# Patient Record
Sex: FEMALE | Race: WHITE | NOT HISPANIC OR LATINO | Employment: OTHER | URBAN - METROPOLITAN AREA
[De-identification: names, ages, dates, MRNs, and addresses within clinical notes are randomized per-mention and may not be internally consistent; named-entity substitution may affect disease eponyms.]

---

## 2017-04-24 ENCOUNTER — TRANSCRIBE ORDERS (OUTPATIENT)
Dept: ADMINISTRATIVE | Facility: HOSPITAL | Age: 57
End: 2017-04-24

## 2017-04-24 DIAGNOSIS — M54.5 LOW BACK PAIN, UNSPECIFIED BACK PAIN LATERALITY, UNSPECIFIED CHRONICITY, WITH SCIATICA PRESENCE UNSPECIFIED: Primary | ICD-10-CM

## 2017-04-24 DIAGNOSIS — M51.36 DEGENERATIVE DISC DISEASE, LUMBAR: ICD-10-CM

## 2017-05-01 ENCOUNTER — HOSPITAL ENCOUNTER (OUTPATIENT)
Dept: MRI IMAGING | Facility: HOSPITAL | Age: 57
Discharge: HOME | End: 2017-05-01
Attending: GENERAL PRACTICE | Admitting: GENERAL PRACTICE

## 2017-05-01 DIAGNOSIS — M51.36 DEGENERATIVE DISC DISEASE, LUMBAR: ICD-10-CM

## 2017-05-01 DIAGNOSIS — M54.5 LOW BACK PAIN, UNSPECIFIED BACK PAIN LATERALITY, UNSPECIFIED CHRONICITY, WITH SCIATICA PRESENCE UNSPECIFIED: ICD-10-CM

## 2017-05-01 PROCEDURE — 72148 MRI LUMBAR SPINE W/O DYE: CPT

## 2018-08-17 ENCOUNTER — TRANSCRIBE ORDERS (OUTPATIENT)
Dept: ADMINISTRATIVE | Facility: HOSPITAL | Age: 58
End: 2018-08-17

## 2018-08-17 DIAGNOSIS — M25.561 RIGHT KNEE PAIN, UNSPECIFIED CHRONICITY: Primary | ICD-10-CM

## 2018-08-23 ENCOUNTER — APPOINTMENT (OUTPATIENT)
Dept: MRI IMAGING | Facility: HOSPITAL | Age: 58
End: 2018-08-23
Attending: GENERAL PRACTICE

## 2018-08-27 ENCOUNTER — HOSPITAL ENCOUNTER (OUTPATIENT)
Dept: MRI IMAGING | Facility: HOSPITAL | Age: 58
End: 2018-08-27
Attending: GENERAL PRACTICE

## 2018-08-29 ENCOUNTER — HOSPITAL ENCOUNTER (OUTPATIENT)
Dept: MRI IMAGING | Facility: HOSPITAL | Age: 58
End: 2018-08-29
Attending: GENERAL PRACTICE

## 2018-09-07 ENCOUNTER — HOSPITAL ENCOUNTER (OUTPATIENT)
Dept: MRI IMAGING | Facility: HOSPITAL | Age: 58
Discharge: HOME | End: 2018-09-07
Attending: GENERAL PRACTICE | Admitting: GENERAL PRACTICE

## 2018-09-07 DIAGNOSIS — M25.561 RIGHT KNEE PAIN, UNSPECIFIED CHRONICITY: ICD-10-CM

## 2018-09-07 PROCEDURE — 73721 MRI JNT OF LWR EXTRE W/O DYE: CPT

## 2019-08-05 ENCOUNTER — TRANSCRIBE ORDERS (OUTPATIENT)
Dept: ADMINISTRATIVE | Facility: HOSPITAL | Age: 59
End: 2019-08-05

## 2019-08-05 DIAGNOSIS — Z12.2 ENCOUNTER FOR SCREENING FOR LUNG CANCER: Primary | ICD-10-CM

## 2019-08-13 ENCOUNTER — HOSPITAL ENCOUNTER (OUTPATIENT)
Dept: CT IMAGING | Facility: HOSPITAL | Age: 59
Discharge: HOME | End: 2019-08-13
Admitting: GENERAL PRACTICE

## 2019-08-13 DIAGNOSIS — Z12.2 ENCOUNTER FOR SCREENING FOR LUNG CANCER: ICD-10-CM

## 2019-08-13 PROCEDURE — G0297 LDCT FOR LUNG CA SCREEN: HCPCS

## 2020-09-05 ENCOUNTER — APPOINTMENT (OUTPATIENT)
Dept: GENERAL RADIOLOGY | Facility: HOSPITAL | Age: 60
End: 2020-09-05

## 2020-09-05 ENCOUNTER — HOSPITAL ENCOUNTER (EMERGENCY)
Facility: HOSPITAL | Age: 60
Discharge: HOME | End: 2020-09-05
Attending: EMERGENCY MEDICINE | Admitting: EMERGENCY MEDICINE

## 2020-09-05 VITALS
OXYGEN SATURATION: 97 % | TEMPERATURE: 98.3 F | RESPIRATION RATE: 18 BRPM | HEIGHT: 66 IN | DIASTOLIC BLOOD PRESSURE: 61 MMHG | HEART RATE: 86 BPM | SYSTOLIC BLOOD PRESSURE: 105 MMHG | BODY MASS INDEX: 17.76 KG/M2

## 2020-09-05 DIAGNOSIS — M65.20 CALCIFIC TENDONITIS: Primary | ICD-10-CM

## 2020-09-05 PROCEDURE — 73502 X-RAY EXAM HIP UNI 2-3 VIEWS: CPT

## 2020-09-05 PROCEDURE — 99283 EMERGENCY DEPT VISIT LOW MDM: CPT

## 2020-09-05 NOTE — ED NOTES
Pt reports hx of RA. C/o L hip pain when she ambulates a certain way. Began Monday; denies known injury. Pt wearing mask. This RN wearing mask and safety glasses.       Jaqui Aleman, RN  09/05/20 7637

## 2020-09-05 NOTE — ED PROVIDER NOTES
" EMERGENCY DEPARTMENT ENCOUNTER    Room Number:  19/19  Date of encounter:  9/5/2020  PCP: Radu Rawls MD  Historian: Patient      HPI:  Chief Complaint: Left hip pain  A complete HPI/ROS/PMH/PSH/SH/FH are unobtainable due to: N/A    Context: Meme Mcginnis is a 60 y.o. female who presents to the ED c/o left hip pain for the last 5 days or so.  She states she felt like her leg \"gave out\" and she stumbled but did not fall.  Since that time, she has had pain over the left lateral aspect of the hip.  If she moves it a certain way, it is sharp and severe.  The pain radiates into her mid thigh and around her buttocks.  There is no numbness or tingling in the leg.  She has noticed no swelling.  She does have rheumatoid arthritis and chronic neck and back pain.  No fevers, chills, nausea, vomiting, diarrhea, cough, congestion, chest pain or trouble breathing.      The patient was placed in a mask in triage, hand hygiene was performed before and after my interaction with the patient.  I wore a mask, safety glasses and gloves during my entire interaction with the patient.    PAST MEDICAL HISTORY  Active Ambulatory Problems     Diagnosis Date Noted   • No Active Ambulatory Problems     Resolved Ambulatory Problems     Diagnosis Date Noted   • No Resolved Ambulatory Problems     Past Medical History:   Diagnosis Date   • Allergic rhinitis    • Anxiety    • Arthralgia    • Chronic neck and back pain    • Chronic radicular low back pain    • Cough    • DDD (degenerative disc disease), cervical    • DDD (degenerative disc disease), lumbar    • Depression    • Fatigue    • Hyperlipidemia    • Insomnia    • Long term use of drug    • Neck pain    • Palpitations          PAST SURGICAL HISTORY  History reviewed. No pertinent surgical history.      FAMILY HISTORY  History reviewed. No pertinent family history.      SOCIAL HISTORY  Social History     Socioeconomic History   • Marital status:      Spouse name: Not on " file   • Number of children: Not on file   • Years of education: Not on file   • Highest education level: Not on file   Tobacco Use   • Smoking status: Current Every Day Smoker         ALLERGIES  Morphine and related        REVIEW OF SYSTEMS  Review of Systems   Constitutional: Negative for fever.   HENT: Negative for sore throat.    Eyes: Negative.    Respiratory: Negative for cough and shortness of breath.    Cardiovascular: Negative for chest pain.   Gastrointestinal: Negative for abdominal pain, diarrhea and vomiting.   Genitourinary: Negative for dysuria.   Musculoskeletal: Positive for back pain (Chronic). Negative for neck pain.        Left hip pain   Skin: Negative for rash.   Allergic/Immunologic: Negative.    Neurological: Negative for weakness, numbness and headaches.   Hematological: Negative.    Psychiatric/Behavioral: Negative.    All other systems reviewed and are negative.       All systems reviewed and negative except for those discussed in HPI.       PHYSICAL EXAM    I have reviewed the triage vital signs and nursing notes.    ED Triage Vitals [09/05/20 1204]   Temp Heart Rate Resp BP SpO2   98.3 °F (36.8 °C) 114 16 -- 97 %      Temp src Heart Rate Source Patient Position BP Location FiO2 (%)   Tympanic Monitor -- -- --       Physical Exam   Constitutional: Pt. is oriented to person, place, and time and well-developed, well-nourished, and in no distress. No distress.   HENT: Normocephalic and atraumatic.  Neck: Normal range of motion. Neck supple.   Cardiovascular: Normal rate, regular rhythm and normal heart sounds. Exam reveals no gallop and no friction rub.   No murmur heard.  Pulmonary/Chest: Effort normal and breath sounds normal. No stridor. No respiratory distress. No wheezes, no rales.   Musculoskeletal: Left lower extremity: There is tenderness over the lateral hip in the area of the greater trochanter.  She has mild pain with external rotation.  Internal rotation is fine.  Forward flexion  is normal as well.  No deformity.  She does have some mild left SI pain.  The remainder of the left lower extremity is unremarkable.  Remaining extremities exhibit normal range of motion and are without edema, tenderness or deformity.   Neurological: Pt. is alert and oriented to person, place, and time. Pt. has normal sensation and normal strength. No cranial nerve deficit. GCS score is 15.   Skin: Skin is warm and dry. No rash noted. Pt. is not diaphoretic. No erythema.   Psychiatric: Mood, affect and judgment normal.   Nursing note and vitals reviewed.        LAB RESULTS  No results found for this or any previous visit (from the past 24 hour(s)).    Ordered the above labs and independently reviewed the results.        RADIOLOGY  Xr Hip With Or Without Pelvis 2 - 3 View Left    Result Date: 9/5/2020  TWO-VIEW LEFT HIP AND ONE VIEW AP PELVIS  HISTORY: Hip pain. No trauma.  FINDINGS: There are minimal degenerative changes involving the left hip more than right with some marginal spurring at the superior margin of the acetabulum. There is also some soft tissue calcification near the greater trochanter likely related to an element of calcific tendinitis.  This report was finalized on 9/5/2020 12:56 PM by Dr. Emiliano Acosta M.D.        I ordered the above noted radiological studies. Reviewed by me and discussed with radiologist.  See dictation for official radiology interpretation.      PROCEDURES    Procedures      MEDICATIONS GIVEN IN ER    Medications - No data to display      PROGRESS, DATA ANALYSIS, CONSULTS, AND MEDICAL DECISION MAKING    Any/all labs have been independently reviewed by me.  Any/all radiology studies have been reviewed by me and discussed with radiologist dictating the report.   EKG's independently viewed and interpreted by me.  Discussion below represents my analysis of pertinent findings related to patient's condition, differential diagnosis, treatment plan and final disposition.      ED Course  as of Sep 05 1321   Sat Sep 05, 2020   1307 Hip x-ray independently viewed by me and discussed with radiology.  Impression is as follows: : There are minimal degenerative changes involving the left hip  more than right with some marginal spurring at the superior margin of  the acetabulum. There is also some soft tissue calcification near the  greater trochanter likely related to an element of calcific tendinitis.: There are minimal degenerative changes involving the left hip  more than right with some marginal spurring at the superior margin of  the acetabulum. There is also some soft tissue calcification near the  greater trochanter likely related to an element of calcific tendinitis.    [WC]      ED Course User Index  [WC] Huy Church MD       AS OF 13:21 VITALS:    BP - 116/85  HR - 114  TEMP - 98.3 °F (36.8 °C) (Tympanic)  02 SATS - 97%        DIAGNOSIS  Final diagnoses:   Calcific tendonitis         DISPOSITION  Discharged           Huy Church MD  09/05/20 1321

## 2021-03-24 ENCOUNTER — BULK ORDERING (OUTPATIENT)
Dept: CASE MANAGEMENT | Facility: OTHER | Age: 61
End: 2021-03-24

## 2021-03-24 DIAGNOSIS — Z23 IMMUNIZATION DUE: ICD-10-CM

## 2021-04-25 ENCOUNTER — APPOINTMENT (OUTPATIENT)
Dept: CT IMAGING | Facility: HOSPITAL | Age: 61
End: 2021-04-25

## 2021-04-25 ENCOUNTER — HOSPITAL ENCOUNTER (EMERGENCY)
Facility: HOSPITAL | Age: 61
Discharge: HOME | End: 2021-04-25
Attending: EMERGENCY MEDICINE | Admitting: EMERGENCY MEDICINE

## 2021-04-25 VITALS
BODY MASS INDEX: 20.99 KG/M2 | TEMPERATURE: 97.8 F | HEIGHT: 65 IN | OXYGEN SATURATION: 99 % | DIASTOLIC BLOOD PRESSURE: 57 MMHG | HEART RATE: 71 BPM | SYSTOLIC BLOOD PRESSURE: 125 MMHG | WEIGHT: 126 LBS | RESPIRATION RATE: 18 BRPM

## 2021-04-25 DIAGNOSIS — R10.13 EPIGASTRIC ABDOMINAL PAIN: ICD-10-CM

## 2021-04-25 DIAGNOSIS — K52.9 COLITIS: Primary | ICD-10-CM

## 2021-04-25 LAB
ALBUMIN SERPL-MCNC: 4.7 G/DL (ref 3.5–5.2)
ALBUMIN/GLOB SERPL: 1.9 G/DL
ALP SERPL-CCNC: 55 U/L (ref 39–117)
ALT SERPL W P-5'-P-CCNC: 22 U/L (ref 1–33)
ANION GAP SERPL CALCULATED.3IONS-SCNC: 11.4 MMOL/L (ref 5–15)
AST SERPL-CCNC: 18 U/L (ref 1–32)
BACTERIA UR QL AUTO: ABNORMAL /HPF
BASOPHILS # BLD AUTO: 0.05 10*3/MM3 (ref 0–0.2)
BASOPHILS NFR BLD AUTO: 1 % (ref 0–1.5)
BILIRUB SERPL-MCNC: 0.3 MG/DL (ref 0–1.2)
BILIRUB UR QL STRIP: NEGATIVE
BUN SERPL-MCNC: 11 MG/DL (ref 6–20)
BUN/CREAT SERPL: 12.8 (ref 7–25)
CALCIUM SPEC-SCNC: 9.7 MG/DL (ref 8.6–10.5)
CHLORIDE SERPL-SCNC: 96 MMOL/L (ref 98–107)
CLARITY UR: CLEAR
CO2 SERPL-SCNC: 27.6 MMOL/L (ref 22–29)
COLOR UR: ABNORMAL
CREAT SERPL-MCNC: 0.86 MG/DL (ref 0.57–1)
DEPRECATED RDW RBC AUTO: 42.9 FL (ref 37–54)
EOSINOPHIL # BLD AUTO: 0.09 10*3/MM3 (ref 0–0.4)
EOSINOPHIL NFR BLD AUTO: 1.7 % (ref 0.3–6.2)
ERYTHROCYTE [DISTWIDTH] IN BLOOD BY AUTOMATED COUNT: 12.5 % (ref 12.3–15.4)
GFR SERPL CREATININE-BSD FRML MDRD: 68 ML/MIN/1.73
GLOBULIN UR ELPH-MCNC: 2.5 GM/DL
GLUCOSE SERPL-MCNC: 103 MG/DL (ref 65–99)
GLUCOSE UR STRIP-MCNC: NEGATIVE MG/DL
HCT VFR BLD AUTO: 40.5 % (ref 34–46.6)
HGB BLD-MCNC: 13.5 G/DL (ref 12–15.9)
HGB UR QL STRIP.AUTO: ABNORMAL
HYALINE CASTS UR QL AUTO: ABNORMAL /LPF
IMM GRANULOCYTES # BLD AUTO: 0.01 10*3/MM3 (ref 0–0.05)
IMM GRANULOCYTES NFR BLD AUTO: 0.2 % (ref 0–0.5)
KETONES UR QL STRIP: ABNORMAL
LEUKOCYTE ESTERASE UR QL STRIP.AUTO: NEGATIVE
LIPASE SERPL-CCNC: 18 U/L (ref 13–60)
LYMPHOCYTES # BLD AUTO: 2.21 10*3/MM3 (ref 0.7–3.1)
LYMPHOCYTES NFR BLD AUTO: 42.3 % (ref 19.6–45.3)
MCH RBC QN AUTO: 31 PG (ref 26.6–33)
MCHC RBC AUTO-ENTMCNC: 33.3 G/DL (ref 31.5–35.7)
MCV RBC AUTO: 93.1 FL (ref 79–97)
MONOCYTES # BLD AUTO: 0.42 10*3/MM3 (ref 0.1–0.9)
MONOCYTES NFR BLD AUTO: 8 % (ref 5–12)
NEUTROPHILS NFR BLD AUTO: 2.44 10*3/MM3 (ref 1.7–7)
NEUTROPHILS NFR BLD AUTO: 46.8 % (ref 42.7–76)
NITRITE UR QL STRIP: NEGATIVE
NRBC BLD AUTO-RTO: 0 /100 WBC (ref 0–0.2)
PH UR STRIP.AUTO: 5.5 [PH] (ref 5–8)
PLATELET # BLD AUTO: 311 10*3/MM3 (ref 140–450)
PMV BLD AUTO: 9.4 FL (ref 6–12)
POTASSIUM SERPL-SCNC: 3.4 MMOL/L (ref 3.5–5.2)
PROT SERPL-MCNC: 7.2 G/DL (ref 6–8.5)
PROT UR QL STRIP: ABNORMAL
RBC # BLD AUTO: 4.35 10*6/MM3 (ref 3.77–5.28)
RBC # UR: ABNORMAL /HPF
REF LAB TEST METHOD: ABNORMAL
SODIUM SERPL-SCNC: 135 MMOL/L (ref 136–145)
SP GR UR STRIP: 1.02 (ref 1–1.03)
SQUAMOUS #/AREA URNS HPF: ABNORMAL /HPF
TROPONIN T SERPL-MCNC: <0.01 NG/ML (ref 0–0.03)
UROBILINOGEN UR QL STRIP: ABNORMAL
WBC # BLD AUTO: 5.22 10*3/MM3 (ref 3.4–10.8)
WBC UR QL AUTO: ABNORMAL /HPF

## 2021-04-25 PROCEDURE — 81001 URINALYSIS AUTO W/SCOPE: CPT | Performed by: EMERGENCY MEDICINE

## 2021-04-25 PROCEDURE — 80053 COMPREHEN METABOLIC PANEL: CPT | Performed by: EMERGENCY MEDICINE

## 2021-04-25 PROCEDURE — 83690 ASSAY OF LIPASE: CPT | Performed by: EMERGENCY MEDICINE

## 2021-04-25 PROCEDURE — 93010 ELECTROCARDIOGRAM REPORT: CPT | Performed by: INTERNAL MEDICINE

## 2021-04-25 PROCEDURE — 74177 CT ABD & PELVIS W/CONTRAST: CPT

## 2021-04-25 PROCEDURE — 85025 COMPLETE CBC W/AUTO DIFF WBC: CPT | Performed by: EMERGENCY MEDICINE

## 2021-04-25 PROCEDURE — 84484 ASSAY OF TROPONIN QUANT: CPT | Performed by: EMERGENCY MEDICINE

## 2021-04-25 PROCEDURE — 99284 EMERGENCY DEPT VISIT MOD MDM: CPT

## 2021-04-25 PROCEDURE — 25010000002 IOPAMIDOL 61 % SOLUTION: Performed by: EMERGENCY MEDICINE

## 2021-04-25 PROCEDURE — 93005 ELECTROCARDIOGRAM TRACING: CPT | Performed by: EMERGENCY MEDICINE

## 2021-04-25 RX ORDER — ONDANSETRON 4 MG/1
4 TABLET, ORALLY DISINTEGRATING ORAL EVERY 8 HOURS PRN
Qty: 15 TABLET | Refills: 0 | Status: SHIPPED | OUTPATIENT
Start: 2021-04-25 | End: 2021-04-30

## 2021-04-25 RX ORDER — METRONIDAZOLE 500 MG/1
500 TABLET ORAL 4 TIMES DAILY
Qty: 28 TABLET | Refills: 0 | Status: SHIPPED | OUTPATIENT
Start: 2021-04-25 | End: 2021-05-02

## 2021-04-25 RX ORDER — SODIUM CHLORIDE 0.9 % (FLUSH) 0.9 %
10 SYRINGE (ML) INJECTION AS NEEDED
Status: DISCONTINUED | OUTPATIENT
Start: 2021-04-25 | End: 2021-04-25 | Stop reason: HOSPADM

## 2021-04-25 RX ORDER — METRONIDAZOLE 500 MG/1
500 TABLET ORAL ONCE
Status: COMPLETED | OUTPATIENT
Start: 2021-04-25 | End: 2021-04-25

## 2021-04-25 RX ADMIN — METRONIDAZOLE 500 MG: 500 TABLET, FILM COATED ORAL at 20:31

## 2021-04-25 RX ADMIN — SODIUM CHLORIDE, POTASSIUM CHLORIDE, SODIUM LACTATE AND CALCIUM CHLORIDE 1000 ML: 600; 310; 30; 20 INJECTION, SOLUTION INTRAVENOUS at 17:13

## 2021-04-25 RX ADMIN — IOPAMIDOL 85 ML: 612 INJECTION, SOLUTION INTRAVENOUS at 18:43

## 2021-04-25 NOTE — ED NOTES
"Pt to ED from home with c/o abd pain x a few months but had worsening pain this weekend.  Pt reports that pain feels like a \"knot\" and has more pressure when bending over.  Pt reports nausea and diarrhea no vomiting.      Pt wearing mask, staff wearing appropriate PPE.     Key Rawls, RN  04/25/21 2572    "

## 2021-04-25 NOTE — ED PROVIDER NOTES
EMERGENCY DEPARTMENT ENCOUNTER    Room Number:  36/36  Date of encounter:  4/25/2021  PCP: Radu Rawls MD  Historian: patient      HPI:  Chief Complaint: abd pain   A complete HPI/ROS/PMH/PSH/SH/FH are unobtainable due to: none    Context: Meme Mcginnis is a 60 y.o. female who presents to the ED c/o abd pain. Onset 2 to 3 months ago.  This is intermittent.  It worsened Friday when she woke up with nausea and bloating.  She reports of epigastric pressure that is 3/10 in intensity.  It worsens with p.o. intake and bending forward.  She reports having mild associated loose stool at times.  No fever, chest pain, shortness of breath.  No history of prior abdominal surgery.      PAST MEDICAL HISTORY  Active Ambulatory Problems     Diagnosis Date Noted   • No Active Ambulatory Problems     Resolved Ambulatory Problems     Diagnosis Date Noted   • No Resolved Ambulatory Problems     Past Medical History:   Diagnosis Date   • Allergic rhinitis    • Anxiety    • Arthralgia    • Chronic neck and back pain    • Chronic radicular low back pain    • Cough    • DDD (degenerative disc disease), cervical    • DDD (degenerative disc disease), lumbar    • Depression    • Fatigue    • Hyperlipidemia    • Insomnia    • Long term use of drug    • Neck pain    • Palpitations          PAST SURGICAL HISTORY  History reviewed. No pertinent surgical history.      FAMILY HISTORY  History reviewed. No pertinent family history.      SOCIAL HISTORY  Social History     Socioeconomic History   • Marital status:      Spouse name: Not on file   • Number of children: Not on file   • Years of education: Not on file   • Highest education level: Not on file   Tobacco Use   • Smoking status: Current Every Day Smoker     Packs/day: 1.00     Types: Cigarettes   • Smokeless tobacco: Never Used   Substance and Sexual Activity   • Alcohol use: Not Currently   • Drug use: Not Currently         ALLERGIES  Morphine and related        REVIEW  OF SYSTEMS  Review of Systems     All systems reviewed and negative except for those discussed in HPI.       PHYSICAL EXAM    I have reviewed the triage vital signs and nursing notes.    ED Triage Vitals   Temp Heart Rate Resp BP SpO2   04/25/21 1610 04/25/21 1610 04/25/21 1610 04/25/21 1622 04/25/21 1610   97.8 °F (36.6 °C) 88 16 141/81 94 %      Temp src Heart Rate Source Patient Position BP Location FiO2 (%)   -- -- 04/25/21 1622 04/25/21 1622 --     Lying Right arm        Physical Exam  GENERAL: not distressed  HENT: nares patent  EYES: no scleral icterus  CV: regular rhythm, regular rate  RESPIRATORY: normal effort, clear to auscultation bilaterally  ABDOMEN: soft, nontender  MUSCULOSKELETAL: no deformity  NEURO: alert, moves all extremities, follows commands  SKIN: warm, dry        LAB RESULTS  Recent Results (from the past 24 hour(s))   Urinalysis With Microscopic If Indicated (No Culture) - Urine, Clean Catch    Collection Time: 04/25/21  4:40 PM    Specimen: Urine, Clean Catch   Result Value Ref Range    Color, UA Dark Yellow (A) Yellow, Straw    Appearance, UA Clear Clear    pH, UA 5.5 5.0 - 8.0    Specific Gravity, UA 1.023 1.005 - 1.030    Glucose, UA Negative Negative    Ketones, UA Trace (A) Negative    Bilirubin, UA Negative Negative    Blood, UA Large (3+) (A) Negative    Protein, UA Trace (A) Negative    Leuk Esterase, UA Negative Negative    Nitrite, UA Negative Negative    Urobilinogen, UA 1.0 E.U./dL 0.2 - 1.0 E.U./dL   Urinalysis, Microscopic Only - Urine, Clean Catch    Collection Time: 04/25/21  4:40 PM    Specimen: Urine, Clean Catch   Result Value Ref Range    RBC, UA 31-50 (A) None Seen, 0-2 /HPF    WBC, UA 0-2 None Seen, 0-2 /HPF    Bacteria, UA None Seen None Seen /HPF    Squamous Epithelial Cells, UA 0-2 None Seen, 0-2 /HPF    Hyaline Casts, UA 13-20 None Seen /LPF    Methodology Automated Microscopy    Comprehensive Metabolic Panel    Collection Time: 04/25/21  4:41 PM    Specimen:  Blood   Result Value Ref Range    Glucose 103 (H) 65 - 99 mg/dL    BUN 11 6 - 20 mg/dL    Creatinine 0.86 0.57 - 1.00 mg/dL    Sodium 135 (L) 136 - 145 mmol/L    Potassium 3.4 (L) 3.5 - 5.2 mmol/L    Chloride 96 (L) 98 - 107 mmol/L    CO2 27.6 22.0 - 29.0 mmol/L    Calcium 9.7 8.6 - 10.5 mg/dL    Total Protein 7.2 6.0 - 8.5 g/dL    Albumin 4.70 3.50 - 5.20 g/dL    ALT (SGPT) 22 1 - 33 U/L    AST (SGOT) 18 1 - 32 U/L    Alkaline Phosphatase 55 39 - 117 U/L    Total Bilirubin 0.3 0.0 - 1.2 mg/dL    eGFR Non African Amer 68 >60 mL/min/1.73    Globulin 2.5 gm/dL    A/G Ratio 1.9 g/dL    BUN/Creatinine Ratio 12.8 7.0 - 25.0    Anion Gap 11.4 5.0 - 15.0 mmol/L   Lipase    Collection Time: 04/25/21  4:41 PM    Specimen: Blood   Result Value Ref Range    Lipase 18 13 - 60 U/L   CBC Auto Differential    Collection Time: 04/25/21  4:41 PM    Specimen: Blood   Result Value Ref Range    WBC 5.22 3.40 - 10.80 10*3/mm3    RBC 4.35 3.77 - 5.28 10*6/mm3    Hemoglobin 13.5 12.0 - 15.9 g/dL    Hematocrit 40.5 34.0 - 46.6 %    MCV 93.1 79.0 - 97.0 fL    MCH 31.0 26.6 - 33.0 pg    MCHC 33.3 31.5 - 35.7 g/dL    RDW 12.5 12.3 - 15.4 %    RDW-SD 42.9 37.0 - 54.0 fl    MPV 9.4 6.0 - 12.0 fL    Platelets 311 140 - 450 10*3/mm3    Neutrophil % 46.8 42.7 - 76.0 %    Lymphocyte % 42.3 19.6 - 45.3 %    Monocyte % 8.0 5.0 - 12.0 %    Eosinophil % 1.7 0.3 - 6.2 %    Basophil % 1.0 0.0 - 1.5 %    Immature Grans % 0.2 0.0 - 0.5 %    Neutrophils, Absolute 2.44 1.70 - 7.00 10*3/mm3    Lymphocytes, Absolute 2.21 0.70 - 3.10 10*3/mm3    Monocytes, Absolute 0.42 0.10 - 0.90 10*3/mm3    Eosinophils, Absolute 0.09 0.00 - 0.40 10*3/mm3    Basophils, Absolute 0.05 0.00 - 0.20 10*3/mm3    Immature Grans, Absolute 0.01 0.00 - 0.05 10*3/mm3    nRBC 0.0 0.0 - 0.2 /100 WBC   Troponin    Collection Time: 04/25/21  4:41 PM    Specimen: Blood   Result Value Ref Range    Troponin T <0.010 0.000 - 0.030 ng/mL   ECG 12 Lead    Collection Time: 04/25/21  5:15 PM    Result Value Ref Range    QT Interval 455 ms       Ordered the above labs and independently reviewed the results.        RADIOLOGY  CT Abdomen Pelvis With Contrast    Result Date: 4/25/2021  Patient: DOMINGUEZ APODACA  Time Out: 20:10 Exam(s): CT ABDOMEN + PELVIS With Contrast EXAM:   CT Abdomen and Pelvis With Intravenous Contrast CLINICAL HISTORY:    epigastric pain and RUQ pain  Reason for exam: epigastric pain and RUQ pain. TECHNIQUE:   Axial computed tomography images of the abdomen and pelvis with intravenous contrast.  CTDI is 9.7 mGy and DLP is 505.0 mGy-cm.  This CT exam was performed according to the principle of ALARA (As Low As Reasonably Achievable) by using one or more of the following dose reduction techniques: automated exposure control, adjustment of the mA and or kV according to patient size, and or use of iterative reconstruction technique. COMPARISON:   CT abdomen pelvis on 11 11 2002 FINDINGS:   Limitations:  Evaluation of the upper abdomen is limited by motion artifact.   Lung bases:  Unremarkable.  No mass.  No consolidation.  ABDOMEN:   Liver:  Unremarkable.  No mass.   Gallbladder and bile ducts:  Unremarkable.  No calcified stones.  No ductal dilation.   Pancreas:  Unremarkable.  No mass.  No ductal dilation.   Spleen:  Unremarkable.  No splenomegaly.   Adrenals:  Unremarkable.  No mass.   Kidneys and ureters:  No hydronephrosis or obstructing stone.   Stomach and bowel:  Mild prominence of the walls of the rectum and sigmoid colon may be secondary to underdistention.  Component of colitis or proctitis is not excluded.  Fluid and gas-filled small bowel loops could represent enteritis or ileus.  Evaluation of the stomach is limited by underdistention.  PELVIS:   Appendix:  No findings to suggest acute appendicitis.   Bladder:  Unremarkable.  No mass.   Reproductive:  Prior hysterectomy.  ABDOMEN and PELVIS:   Intraperitoneal space:  Unremarkable.  No free air.  No significant fluid collection.    Bones joints:  No acute fracture.  No dislocation.   Soft tissues:  Unremarkable.   Vasculature:  Atherosclerotic changes of the vasculature.  No aortic aneurysm or dissection.   Lymph nodes:  Unremarkable.  No enlarged lymph nodes. IMPRESSION:     1.  Mild prominence of the walls of the rectum and sigmoid colon may be secondary to underdistention.  Component of colitis or proctitis is not excluded. 2.  Fluid and gas-filled small bowel loops could represent enteritis or ileus.     Electronically signed by Sierra Arenas M.D. on 04-25-21 at 2010      I ordered the above noted radiological studies. Reviewed by me and discussed with radiologist.  See dictation for official radiology interpretation.      PROCEDURES    Procedures      MEDICATIONS GIVEN IN ER    Medications   sodium chloride 0.9 % flush 10 mL (has no administration in time range)   metroNIDAZOLE (FLAGYL) tablet 500 mg (has no administration in time range)   lactated ringers bolus 1,000 mL (0 mL Intravenous Stopped 4/25/21 1743)   iopamidol (ISOVUE-300) 61 % injection 100 mL (85 mL Intravenous Given by Other 4/25/21 1843)         PROGRESS, DATA ANALYSIS, CONSULTS, AND MEDICAL DECISION MAKING    All labs have been independently reviewed by me.  All radiology studies have been reviewed by me and discussed with radiologist dictating the report.   EKG's independently viewed and interpreted by me.  Discussion below represents my analysis of pertinent findings related to patient's condition, differential diagnosis, treatment plan and final disposition.    Differential diagnosis includes but not limited to:  - hepatobiliary pathology such as cholecystitis, cholangitis, and symptomatic cholelithiasis  - Pancreatitis  - Dyspepsia  - Small bowel obstruction  - Appendicitis  - Diverticulitis  - UTI including pyelonephritis  - Ureteral stone  - Zoster  - Colitis, including infectious and ischemic  - Atypical ACS    ED Course as of Apr 25 2021   Sun Apr 25, 2021    1633 On medical chart review, patient was last seen emergency department on 9/5/2020 for left hip pain.  She was diagnosed with calcific tendinitis.    [TD]   1709 RBC, UA(!): 31-50 [TD]   1709 WBC, UA: 0-2 [TD]   1709 Lipase: 18 [TD]   1733 EKG interpreted by myself.  Time 1715.  Sinus rhythm.  Heart rate 63.  Normal intervals and axis.  No acute ST abnormality.    [TD]   1753 Sodium(!): 135 [TD]   1753 Creatinine: 0.86 [TD]   1753 Potassium(!): 3.4 [TD]   2019 I updated the patient on CT results.  I Christie prescribe her Flagyl for the potential colitis.  I did discuss the diagnostic uncertainty.  I gave her return precautions.  Discharge home.    [TD]      ED Course User Index  [TD] Radu Beverly II, MD           PPE: Both the patient and I wore a surgical mask throughout the entire patient encounter. I wore protective goggles.     AS OF 20:21 EDT VITALS:    BP - 141/81  HR - 65  TEMP - 97.8 °F (36.6 °C)  O2 SATS - 99%        DIAGNOSIS  Final diagnoses:   Colitis   Epigastric abdominal pain         DISPOSITION  DISCHARGE    FOLLOW-UP  Radu Rawls MD  [address] [id number] [phone]    Schedule an appointment as soon as possible for a visit in 1 week  if symptoms do not improve    [facility] Emergency Department  [address] [facility] [address] [phone]  Go to   If symptoms worsen         Medication List      New Prescriptions    metroNIDAZOLE 500 MG tablet  Commonly known as: FLAGYL  Take 1 tablet by mouth 4 (Four) Times a Day for 7 days.     ondansetron ODT 4 MG disintegrating tablet  Commonly known as: ZOFRAN-ODT  Place 1 tablet on the tongue Every 8 (Eight) Hours As Needed for Nausea for up to 5 days.           Where to Get Your Medications      [id number] [place], [place] - [address] AT [facility] & [address] - [phone] [facility] [phone]   [address],  [facility] [address]    Phone: [phone]   · metroNIDAZOLE 500 MG tablet  · ondansetron ODT 4 MG disintegrating tablet                  Radu Beverly II, MD  04/25/21 2021

## 2021-04-25 NOTE — ED NOTES
Patient wearing mask, nurse wearing mask and protective eyewear during care and assessment.  Hand hygiene performed prior to and post care.      Akira Banks RN  04/25/21 1170

## 2021-04-26 LAB — QT INTERVAL: 455 MS

## 2021-09-11 ENCOUNTER — APPOINTMENT (OUTPATIENT)
Dept: CT IMAGING | Facility: HOSPITAL | Age: 61
End: 2021-09-11

## 2021-09-11 ENCOUNTER — HOSPITAL ENCOUNTER (EMERGENCY)
Facility: HOSPITAL | Age: 61
Discharge: HOME | End: 2021-09-11
Attending: EMERGENCY MEDICINE | Admitting: EMERGENCY MEDICINE

## 2021-09-11 VITALS
SYSTOLIC BLOOD PRESSURE: 118 MMHG | DIASTOLIC BLOOD PRESSURE: 57 MMHG | RESPIRATION RATE: 16 BRPM | WEIGHT: 145 LBS | HEIGHT: 65 IN | OXYGEN SATURATION: 94 % | TEMPERATURE: 97.7 F | HEART RATE: 84 BPM | BODY MASS INDEX: 24.16 KG/M2

## 2021-09-11 DIAGNOSIS — K52.9 COLITIS PRESUMED INFECTIOUS: Primary | ICD-10-CM

## 2021-09-11 LAB
ALBUMIN SERPL-MCNC: 4.4 G/DL (ref 3.5–5.2)
ALBUMIN/GLOB SERPL: 1.8 G/DL
ALP SERPL-CCNC: 72 U/L (ref 39–117)
ALT SERPL W P-5'-P-CCNC: 17 U/L (ref 1–33)
ANION GAP SERPL CALCULATED.3IONS-SCNC: 12.8 MMOL/L (ref 5–15)
AST SERPL-CCNC: 21 U/L (ref 1–32)
BACTERIA UR QL AUTO: ABNORMAL /HPF
BASOPHILS # BLD AUTO: 0.04 10*3/MM3 (ref 0–0.2)
BASOPHILS NFR BLD AUTO: 0.7 % (ref 0–1.5)
BILIRUB SERPL-MCNC: 0.2 MG/DL (ref 0–1.2)
BILIRUB UR QL STRIP: NEGATIVE
BUN SERPL-MCNC: 11 MG/DL (ref 6–20)
BUN/CREAT SERPL: 13.4 (ref 7–25)
CALCIUM SPEC-SCNC: 9.4 MG/DL (ref 8.6–10.5)
CHLORIDE SERPL-SCNC: 102 MMOL/L (ref 98–107)
CLARITY UR: ABNORMAL
CO2 SERPL-SCNC: 25.2 MMOL/L (ref 22–29)
COLOR UR: YELLOW
CREAT SERPL-MCNC: 0.82 MG/DL (ref 0.57–1)
DEPRECATED RDW RBC AUTO: 44 FL (ref 37–54)
EOSINOPHIL # BLD AUTO: 0.02 10*3/MM3 (ref 0–0.4)
EOSINOPHIL NFR BLD AUTO: 0.4 % (ref 0.3–6.2)
ERYTHROCYTE [DISTWIDTH] IN BLOOD BY AUTOMATED COUNT: 12.9 % (ref 12.3–15.4)
GFR SERPL CREATININE-BSD FRML MDRD: 71 ML/MIN/1.73
GLOBULIN UR ELPH-MCNC: 2.5 GM/DL
GLUCOSE SERPL-MCNC: 92 MG/DL (ref 65–99)
GLUCOSE UR STRIP-MCNC: NEGATIVE MG/DL
HCT VFR BLD AUTO: 40.6 % (ref 34–46.6)
HGB BLD-MCNC: 13.3 G/DL (ref 12–15.9)
HGB UR QL STRIP.AUTO: ABNORMAL
HOLD SPECIMEN: NORMAL
HOLD SPECIMEN: NORMAL
HYALINE CASTS UR QL AUTO: ABNORMAL /LPF
IMM GRANULOCYTES # BLD AUTO: 0.01 10*3/MM3 (ref 0–0.05)
IMM GRANULOCYTES NFR BLD AUTO: 0.2 % (ref 0–0.5)
KETONES UR QL STRIP: ABNORMAL
LEUKOCYTE ESTERASE UR QL STRIP.AUTO: ABNORMAL
LIPASE SERPL-CCNC: 18 U/L (ref 13–60)
LYMPHOCYTES # BLD AUTO: 1.24 10*3/MM3 (ref 0.7–3.1)
LYMPHOCYTES NFR BLD AUTO: 21.7 % (ref 19.6–45.3)
MCH RBC QN AUTO: 31 PG (ref 26.6–33)
MCHC RBC AUTO-ENTMCNC: 32.8 G/DL (ref 31.5–35.7)
MCV RBC AUTO: 94.6 FL (ref 79–97)
MONOCYTES # BLD AUTO: 0.35 10*3/MM3 (ref 0.1–0.9)
MONOCYTES NFR BLD AUTO: 6.1 % (ref 5–12)
NEUTROPHILS NFR BLD AUTO: 4.05 10*3/MM3 (ref 1.7–7)
NEUTROPHILS NFR BLD AUTO: 70.9 % (ref 42.7–76)
NITRITE UR QL STRIP: NEGATIVE
NRBC BLD AUTO-RTO: 0 /100 WBC (ref 0–0.2)
PH UR STRIP.AUTO: 6.5 [PH] (ref 5–8)
PLATELET # BLD AUTO: 341 10*3/MM3 (ref 140–450)
PMV BLD AUTO: 9.3 FL (ref 6–12)
POTASSIUM SERPL-SCNC: 3.4 MMOL/L (ref 3.5–5.2)
PROT SERPL-MCNC: 6.9 G/DL (ref 6–8.5)
PROT UR QL STRIP: NEGATIVE
RBC # BLD AUTO: 4.29 10*6/MM3 (ref 3.77–5.28)
RBC # UR: ABNORMAL /HPF
REF LAB TEST METHOD: ABNORMAL
SODIUM SERPL-SCNC: 140 MMOL/L (ref 136–145)
SP GR UR STRIP: 1.02 (ref 1–1.03)
SQUAMOUS #/AREA URNS HPF: ABNORMAL /HPF
UROBILINOGEN UR QL STRIP: ABNORMAL
WBC # BLD AUTO: 5.71 10*3/MM3 (ref 3.4–10.8)
WBC UR QL AUTO: ABNORMAL /HPF
WHOLE BLOOD HOLD SPECIMEN: NORMAL
WHOLE BLOOD HOLD SPECIMEN: NORMAL

## 2021-09-11 PROCEDURE — 80053 COMPREHEN METABOLIC PANEL: CPT | Performed by: EMERGENCY MEDICINE

## 2021-09-11 PROCEDURE — 83690 ASSAY OF LIPASE: CPT | Performed by: EMERGENCY MEDICINE

## 2021-09-11 PROCEDURE — 25010000002 ONDANSETRON PER 1 MG: Performed by: PHYSICIAN ASSISTANT

## 2021-09-11 PROCEDURE — 81001 URINALYSIS AUTO W/SCOPE: CPT | Performed by: EMERGENCY MEDICINE

## 2021-09-11 PROCEDURE — 96374 THER/PROPH/DIAG INJ IV PUSH: CPT

## 2021-09-11 PROCEDURE — 25010000002 IOPAMIDOL 61 % SOLUTION: Performed by: EMERGENCY MEDICINE

## 2021-09-11 PROCEDURE — 99283 EMERGENCY DEPT VISIT LOW MDM: CPT

## 2021-09-11 PROCEDURE — 74177 CT ABD & PELVIS W/CONTRAST: CPT

## 2021-09-11 PROCEDURE — 85025 COMPLETE CBC W/AUTO DIFF WBC: CPT | Performed by: EMERGENCY MEDICINE

## 2021-09-11 RX ORDER — SODIUM CHLORIDE 0.9 % (FLUSH) 0.9 %
10 SYRINGE (ML) INJECTION AS NEEDED
Status: DISCONTINUED | OUTPATIENT
Start: 2021-09-11 | End: 2021-09-11 | Stop reason: HOSPADM

## 2021-09-11 RX ORDER — METRONIDAZOLE 500 MG/1
500 TABLET ORAL 2 TIMES DAILY
Qty: 14 TABLET | Refills: 0 | Status: SHIPPED | OUTPATIENT
Start: 2021-09-11 | End: 2021-09-28

## 2021-09-11 RX ORDER — ONDANSETRON 2 MG/ML
4 INJECTION INTRAMUSCULAR; INTRAVENOUS ONCE
Status: COMPLETED | OUTPATIENT
Start: 2021-09-11 | End: 2021-09-11

## 2021-09-11 RX ADMIN — IOPAMIDOL 85 ML: 612 INJECTION, SOLUTION INTRAVENOUS at 16:05

## 2021-09-11 RX ADMIN — SODIUM CHLORIDE 1000 ML: 9 INJECTION, SOLUTION INTRAVENOUS at 12:36

## 2021-09-11 RX ADMIN — ONDANSETRON 4 MG: 2 INJECTION INTRAMUSCULAR; INTRAVENOUS at 12:36

## 2021-09-11 NOTE — ED NOTES
This RN wearing all appropriate PPE during patient encounter. Hand hygiene performed before and during entering room.       Key Martínez, BOLIVAR  09/11/21 0762

## 2021-09-11 NOTE — ED NOTES
This RN wearing all appropriate PPE during patient encounter. Hand hygiene performed before and during entering room.       Key Martínez, RN  09/11/21 7740

## 2021-09-11 NOTE — ED TRIAGE NOTES
Pt has had diarrhea since last weekend and is now incontinent of stool.      Patient was placed in face mask during first look triage.  Patient was wearing a face mask throughout encounter.  I wore personal protective equipment throughout the encounter.  Hand hygiene was performed before and after patient encounter.

## 2021-09-11 NOTE — ED NOTES
This RN wearing all appropriate PPE during patient encounter. Hand hygiene performed before and during entering room.       Key Martínez, BOLIVAR  09/11/21 9551

## 2021-09-11 NOTE — ED PROVIDER NOTES
The INDY and I have discussed this patient's history, physical exam, and treatment plan. I have reviewed the documentation and personally had a face to face interaction with the patient  I affirm the documentation and agree with the treatment and plan.  The following describes my personal findings.    The patient presents complaining of abdominal discomfort which she describes as cramping in nature for the past month similar to previous episode in April when she was diagnosed with colitis.  Patient reports she has had diarrhea for 10 days, 8 times in the past 24 hours, denies fever, cough, chest pain, shortness of air, nausea or vomiting.  Patient denies sick contacts, reports she took antibiotics in April but none recently.  Patient denies changes in her urinary habits.  She reports a history of hysterectomy but denies other abdominal surgeries.  Additionally patient reports she has an appointment 9/28/2021 with [facility].    Limited physical exam:  Patient is nontoxic appearing mild discomfort  Lungs/cardiovascular: Nontachypneic, nontachycardic, breath sounds are well bilaterally  Abdomen: Soft, mild tenderness to palpation diffuse, positive bowel sounds without guarding or rebound, negative Cole sign  Back/extremities: Posterior tibial pulses intact bilateral lower extremities without edema    Anticipate outpatient follow-up barring unforeseen findings in the ER.    LAB RESULTS  Recent Results (from the past 24 hour(s))   Comprehensive Metabolic Panel    Collection Time: 09/11/21 12:35 PM    Specimen: Blood   Result Value Ref Range    Glucose 92 65 - 99 mg/dL    BUN 11 6 - 20 mg/dL    Creatinine 0.82 0.57 - 1.00 mg/dL    Sodium 140 136 - 145 mmol/L    Potassium 3.4 (L) 3.5 - 5.2 mmol/L    Chloride 102 98 - 107 mmol/L    CO2 25.2 22.0 - 29.0 mmol/L    Calcium 9.4 8.6 - 10.5 mg/dL    Total Protein 6.9 6.0 - 8.5 g/dL    Albumin 4.40 3.50 - 5.20 g/dL    ALT (SGPT) 17 1 - 33 U/L    AST (SGOT) 21 1 - 32 U/L     Alkaline Phosphatase 72 39 - 117 U/L    Total Bilirubin 0.2 0.0 - 1.2 mg/dL    eGFR Non African Amer 71 >60 mL/min/1.73    Globulin 2.5 gm/dL    A/G Ratio 1.8 g/dL    BUN/Creatinine Ratio 13.4 7.0 - 25.0    Anion Gap 12.8 5.0 - 15.0 mmol/L   Lipase    Collection Time: 09/11/21 12:35 PM    Specimen: Blood   Result Value Ref Range    Lipase 18 13 - 60 U/L   Green Top (Gel)    Collection Time: 09/11/21 12:35 PM   Result Value Ref Range    Extra Tube Hold for add-ons.    Lavender Top    Collection Time: 09/11/21 12:35 PM   Result Value Ref Range    Extra Tube hold for add-on    Gold Top - SST    Collection Time: 09/11/21 12:35 PM   Result Value Ref Range    Extra Tube Hold for add-ons.    Light Blue Top    Collection Time: 09/11/21 12:35 PM   Result Value Ref Range    Extra Tube hold for add-on    CBC Auto Differential    Collection Time: 09/11/21 12:35 PM    Specimen: Blood   Result Value Ref Range    WBC 5.71 3.40 - 10.80 10*3/mm3    RBC 4.29 3.77 - 5.28 10*6/mm3    Hemoglobin 13.3 12.0 - 15.9 g/dL    Hematocrit 40.6 34.0 - 46.6 %    MCV 94.6 79.0 - 97.0 fL    MCH 31.0 26.6 - 33.0 pg    MCHC 32.8 31.5 - 35.7 g/dL    RDW 12.9 12.3 - 15.4 %    RDW-SD 44.0 37.0 - 54.0 fl    MPV 9.3 6.0 - 12.0 fL    Platelets 341 140 - 450 10*3/mm3    Neutrophil % 70.9 42.7 - 76.0 %    Lymphocyte % 21.7 19.6 - 45.3 %    Monocyte % 6.1 5.0 - 12.0 %    Eosinophil % 0.4 0.3 - 6.2 %    Basophil % 0.7 0.0 - 1.5 %    Immature Grans % 0.2 0.0 - 0.5 %    Neutrophils, Absolute 4.05 1.70 - 7.00 10*3/mm3    Lymphocytes, Absolute 1.24 0.70 - 3.10 10*3/mm3    Monocytes, Absolute 0.35 0.10 - 0.90 10*3/mm3    Eosinophils, Absolute 0.02 0.00 - 0.40 10*3/mm3    Basophils, Absolute 0.04 0.00 - 0.20 10*3/mm3    Immature Grans, Absolute 0.01 0.00 - 0.05 10*3/mm3    nRBC 0.0 0.0 - 0.2 /100 WBC   Urinalysis With Microscopic If Indicated (No Culture) - Urine, Clean Catch    Collection Time: 09/11/21  4:25 PM    Specimen: Urine, Clean Catch   Result Value Ref  Range    Color, UA Yellow Yellow, Straw    Appearance, UA Cloudy (A) Clear    pH, UA 6.5 5.0 - 8.0    Specific Gravity, UA 1.016 1.005 - 1.030    Glucose, UA Negative Negative    Ketones, UA 80 mg/dL (3+) (A) Negative    Bilirubin, UA Negative Negative    Blood, UA Moderate (2+) (A) Negative    Protein, UA Negative Negative    Leuk Esterase, UA Moderate (2+) (A) Negative    Nitrite, UA Negative Negative    Urobilinogen, UA 0.2 E.U./dL 0.2 - 1.0 E.U./dL   Urinalysis, Microscopic Only - Urine, Clean Catch    Collection Time: 09/11/21  4:25 PM    Specimen: Urine, Clean Catch   Result Value Ref Range    RBC, UA 21-30 (A) None Seen, 0-2 /HPF    WBC, UA 3-5 (A) None Seen, 0-2 /HPF    Bacteria, UA None Seen None Seen /HPF    Squamous Epithelial Cells, UA 0-2 None Seen, 0-2 /HPF    Hyaline Casts, UA 0-2 None Seen /LPF    Methodology Automated Microscopy        I ordered the above labs and reviewed the results.    RADIOLOGY  CT Abdomen Pelvis With Contrast    Result Date: 9/11/2021  CT ABDOMEN PELVIS W CONTRAST-  INDICATIONS: HISTORY of colitis, check for abscess. Diarrhea.  TECHNIQUE: Radiation dose reduction techniques were utilized, including automated exposure control and exposure modulation based on body size. Enhanced ABDOMEN AND PELVIS CT  COMPARISON: 04/25/2021  FINDINGS:  No abscess is identified.  The caliber of the main pancreatic duct at the pancreatic head is borderline prominent, 3 mm, without a specific cause identified, axial image 47.  The urinary bladder is empty, limiting its assessment.  Otherwise unremarkable appearance of the liver, spleen, adrenal glands, pancreas, kidneys, bladder.  No bowel obstruction . Mild wall thickening is apparent in the ascending colon the main part be from lack of distention, but could be evidence of nonspecific colitis. The appendix is not appear inflamed.  No free intraperitoneal gas or free fluid.  Scattered small mesenteric and para-aortic lymph nodes are seen that are  not significant by size criteria.  Abdominal aorta is not aneurysmal. Aortic and other arterial calcifications are present.  The lung bases are clear.  Degenerative changes are seen in the spine. No acute fracture is identified.         No abscess is identified. Mild wall thickening is apparent in the ascending colon the main part be from lack of distention, but could be evidence of nonspecific colitis.  This report was finalized on 9/11/2021 4:43 PM by Dr. Narayan Burr M.D.        I ordered the above noted radiological studies. I reviewed the images and results. I agree with the radiologist interpretation.    PROCEDURES  Procedures      PROGRESS, DATA ANALYSIS, CONSULTS, AND MEDICAL DECISION MAKING  A complete history and physical exam have been performed.  All available laboratory and imaging results have been reviewed by myself prior to disposition.        [facility]    ED Course as of Sep 11 1728   Sat Sep 11, 2021   1526 CT abdomen pelvis still pending at this time.  Patient turned over to AMANDO Barclay PA-C for final disposition.    [RC]   1704 Patient CT reviewed.  There is mild wall thickening in the ascending colon, compatible with colitis.  I have reviewed these findings with the patient.  I instructed her that we will discharge her home with Flagyl.  She is agreeable with this plan.  She was instructed to follow-up with her primary care provider.    [AR]      ED Course User Index  [AR] Marlene Barclay PA  [RC] Jeff Becerra III, PA       AS OF 17:28 EDT VITALS:    BP - 118/57  HR - 84  TEMP - 97.7 °F (36.5 °C) (Tympanic)  O2 SATS - 94%    DIAGNOSIS  Final diagnoses:   Colitis presumed infectious         DISPOSITION  DISCHARGE    Patient discharged in stable condition.    Reviewed implications of results, diagnosis, meds, responsibility to follow up, warning signs and symptoms of possible worsening, potential complications and reasons to return to ER    Patient/Family voiced understanding of  above instructions.    Discussed plan for discharge, as there is no emergent indication for admission. Patient referred to primary care provider for BP management due to today's BP. Pt/family is agreeable and understands need for follow up and repeat testing.  Pt is aware that discharge does not mean that nothing is wrong but it indicates no emergency is present that requires admission and they must continue care with follow-up as given below or physician of their choice.     FOLLOW-UP  Radu Rawls MD  [address] [facility] [address] [phone]    Schedule an appointment as soon as possible for a visit   For further evaluation and treatment in interim management    Your GI doctor      For further evaluation and treatment as scheduled         Medication List      New Prescriptions    metroNIDAZOLE 500 MG tablet  Commonly known as: FLAGYL  Take 1 tablet by mouth 2 (Two) Times a Day.           Where to Get Your Medications      You can get these medications from any pharmacy    Bring a paper prescription for each of these medications  · metroNIDAZOLE 500 MG tablet             Face mask, protective goggles and gloves were worn throughout the patient encounter, unless additional PPE was worn as indicated. Hand hygiene was performed before entering and after leaving the patient room.        Patient was wearing facemask when I entered the room and throughout our encounter. Full protective equipment was worn throughout this patient encounter including a face mask, eye protection and gloves. Hand hygiene was performed before donning protective equipment and after removal when leaving the room.           Lawanda Dinh MD  09/11/21 [id number]

## 2021-09-11 NOTE — ED NOTES
This RN wearing all appropriate PPE during patient encounter. Hand hygiene performed before and during entering room.       Key Martínez, RN  09/11/21 4612

## 2021-09-11 NOTE — ED PROVIDER NOTES
EMERGENCY DEPARTMENT ENCOUNTER    Room Number:  06/06  Date of encounter:  9/13/2021  PCP: Radu Rawls MD  Historian: Patient      HPI:  Chief Complaint: Abdominal pain and diarrhea  A complete HPI/ROS/PMH/PSH/SH/FH are unobtainable due to: Nothing    Context: Meme Mcginnis is a 61 y.o. female who presents to the ED c/o uncontrollable explosive diarrhea that has been ongoing for greater than a week.  She denies bloody stool, active vomiting, chest pain, palpitations associated with her symptoms.  Patient states there is mild cramping associated with her diarrhea but she would not describe it as pain. She denies recent sick contacts or antibiotic use.  She informs me she was seen in April 2021 in this emergency department for similar symptoms.  She was diagnosed with colitis at that time.  She was instructed to follow-up with GI but her symptoms resolved and the appointment was never confirmed.      She informs me that she recently has confirmed an appointment with a GI doctor.  At patient's 4/25/2021 visit, a CT did show colitis.  She was treated with Flagyl for presumed infectious colitis and her symptoms did resolve.    PAST MEDICAL HISTORY  Active Ambulatory Problems     Diagnosis Date Noted   • No Active Ambulatory Problems     Resolved Ambulatory Problems     Diagnosis Date Noted   • No Resolved Ambulatory Problems     Past Medical History:   Diagnosis Date   • Allergic rhinitis    • Anxiety    • Arthralgia    • Chronic neck and back pain    • Chronic radicular low back pain    • Cough    • DDD (degenerative disc disease), cervical    • DDD (degenerative disc disease), lumbar    • Depression    • Fatigue    • Hyperlipidemia    • Insomnia    • Long term use of drug    • Neck pain    • Palpitations          PAST SURGICAL HISTORY  No past surgical history on file.      FAMILY HISTORY  No family history on file.      SOCIAL HISTORY  Social History     Socioeconomic History   • Marital status:       Spouse name: Not on file   • Number of children: Not on file   • Years of education: Not on file   • Highest education level: Not on file   Tobacco Use   • Smoking status: Current Every Day Smoker     Packs/day: 1.00     Types: Cigarettes   • Smokeless tobacco: Never Used   Substance and Sexual Activity   • Alcohol use: Not Currently   • Drug use: Not Currently         ALLERGIES  Morphine and related        REVIEW OF SYSTEMS  Review of Systems   Constitutional: Positive for chills. Negative for fever.   Respiratory: Negative for cough and shortness of breath.    Cardiovascular: Negative for chest pain, palpitations and leg swelling.   Gastrointestinal: Positive for abdominal pain (Mild abdominal cramping intermittent), diarrhea and nausea. Negative for blood in stool and vomiting.   Genitourinary: Negative for dysuria and flank pain.   Musculoskeletal: Negative for back pain and neck pain.   Skin: Negative.    Neurological: Negative.    Psychiatric/Behavioral: Negative.         All systems reviewed and negative except for those discussed in HPI.       PHYSICAL EXAM    I have reviewed the triage vital signs and nursing notes.    ED Triage Vitals [09/11/21 1057]   Temp Heart Rate Resp BP SpO2   97.7 °F (36.5 °C) 111 16 -- 96 %      Temp src Heart Rate Source Patient Position BP Location FiO2 (%)   Tympanic Monitor -- -- --       Physical Exam  GENERAL: WDWN female no acute distress  HENT: nares patent, mucous memories dry  EYES: no scleral icterus  CV: regular rhythm, tachycardic without rubs murmurs gallops  RESPIRATORY: normal effort lungs CTA B  ABDOMEN: Mild tenderness, no guarding,, no mass, bowel sounds are slightly hyperactive   MUSCULOSKELETAL: no deformity  NEURO: alert and oriented x4, moves all extremities, follows commands  SKIN: Questionable decreased skin turgor        LAB RESULTS  No results found for this or any previous visit (from the past 24 hour(s)).    Ordered the above labs and independently  reviewed the results.        RADIOLOGY  No Radiology Exams Resulted Within Past 24 Hours    I ordered the above noted radiological studies. Reviewed by me and discussed with radiologist.  See dictation for official radiology interpretation.      PROCEDURES    Procedures      MEDICATIONS GIVEN IN ER    Medications   sodium chloride 0.9 % bolus 1,000 mL (0 mL Intravenous Stopped 9/11/21 1547)   ondansetron (ZOFRAN) injection 4 mg (4 mg Intravenous Given 9/11/21 1236)   iopamidol (ISOVUE-300) 61 % injection 100 mL (85 mL Intravenous Given by Other 9/11/21 1605)         PROGRESS, DATA ANALYSIS, CONSULTS, AND MEDICAL DECISION MAKING    All labs have been independently reviewed by me.  All radiology studies have been reviewed by me and discussed with radiologist dictating the report.   EKG's independently viewed and interpreted by me.  Discussion below represents my analysis of pertinent findings related to patient's condition, differential diagnosis, treatment plan and final disposition.    DDx includes but is not limited to: Infectious colitis, inflammatory colitis, viral GE, C. difficile, other infectious colitis.    ED Course as of Sep 13 2230   Sat Sep 11, 2021   1526 CT abdomen pelvis still pending at this time.  Patient turned over to AMANDO Barclay PA-C for final disposition.    [RC]   1704 Patient CT reviewed.  There is mild wall thickening in the ascending colon, compatible with colitis.  I have reviewed these findings with the patient.  I instructed her that we will discharge her home with Flagyl.  She is agreeable with this plan.  She was instructed to follow-up with her primary care provider.    [AR]      ED Course User Index  [AR] Marlene Barclay PA  [RC] Jeff Becerra III, PA       Patient was placed in face mask in first look. Patient was wearing facemask when I entered the room and throughout our encounter. I wore full protective equipment throughout this patient encounter including a face  mask, and gloves. Hand hygiene was performed before donning protective equipment and after removal when leaving the room.  AS OF 22:30 EDT VITALS:    BP - 118/57  HR - 84  TEMP - 97.7 °F (36.5 °C) (Tympanic)  O2 SATS - 94%        DIAGNOSIS  Final diagnoses:   Colitis presumed infectious         DISPOSITION  DISCHARGE    Patient discharged in stable condition.    Reviewed implications of results, diagnosis, meds, responsibility to follow up, warning signs and symptoms of possible worsening, potential complications and reasons to return to ER.    Patient/Family voiced understanding of above instructions.    Discussed plan for discharge, as there is no emergent indication for admission. Patient referred to primary care provider for BP management due to today's BP. Pt/family is agreeable and understands need for follow up and repeat testing.  Pt is aware that discharge does not mean that nothing is wrong but it indicates no emergency is present that requires admission and they must continue care with follow-up as given below or physician of their choice.     FOLLOW-UP  Radu Rawls MD  [address] [facility] [address] [phone]    Schedule an appointment as soon as possible for a visit   For further evaluation and treatment in interim management    Your GI doctor      For further evaluation and treatment as scheduled         Medication List      New Prescriptions    metroNIDAZOLE 500 MG tablet  Commonly known as: FLAGYL  Take 1 tablet by mouth 2 (Two) Times a Day.           Where to Get Your Medications      You can get these medications from any pharmacy    Bring a paper prescription for each of these medications  · metroNIDAZOLE 500 MG tablet                Jeff Becerra III, PA  09/13/21 7374

## 2021-09-28 ENCOUNTER — TELEPHONE (OUTPATIENT)
Dept: GASTROENTEROLOGY | Facility: CLINIC | Age: 61
End: 2021-09-28

## 2021-09-28 ENCOUNTER — OFFICE VISIT (OUTPATIENT)
Dept: GASTROENTEROLOGY | Facility: CLINIC | Age: 61
End: 2021-09-28

## 2021-09-28 VITALS — WEIGHT: 118 LBS | HEIGHT: 65 IN | BODY MASS INDEX: 19.66 KG/M2 | TEMPERATURE: 96.9 F

## 2021-09-28 DIAGNOSIS — K59.00 CONSTIPATION, UNSPECIFIED CONSTIPATION TYPE: ICD-10-CM

## 2021-09-28 DIAGNOSIS — R93.3 ABNORMAL CT SCAN, COLON: Primary | ICD-10-CM

## 2021-09-28 PROCEDURE — 99204 OFFICE O/P NEW MOD 45 MIN: CPT | Performed by: NURSE PRACTITIONER

## 2021-09-28 NOTE — PROGRESS NOTES
Chief Complaint   Patient presents with   • Abnormal CT       HPI    Meme Mcginnis is a  61 y.o. female here to establish care as a new patient for colitis diagnosed on 2 separate ER evaluations in April and September after patient was seen at [facility].    She will also follow with Dr. Gipson.      Reviewed recent CT from September showing no evidence of an abscess but mild wall thickening in the ascending colon questionable nonspecific colitis.  CBC and CMP at that time normal.  Stool testing to include GI PCR and C. difficile have been ordered but not turned in.  Patient treated with Flagyl.    On visit today patient reports diarrhea resolved after treatment with Flagyl.  She is back to her baseline constipation which is 1 bowel movement every 5 days.  Denies abdominal pain, rectal pain, rectal bleeding.  Patient reports having a colonoscopy greater than 9 years ago but based on her description sounds like an adequate bowel prep.    No upper GI complaints.    No family history of colon cancer or inflammatory bowel disease.    Past Medical History:   Diagnosis Date   • Allergic rhinitis    • Anxiety    • Arthralgia    • Chronic neck and back pain    • Chronic radicular low back pain    • Cough    • DDD (degenerative disc disease), cervical    • DDD (degenerative disc disease), lumbar    • Depression    • Fatigue    • Hyperlipidemia    • Insomnia    • Long term use of drug    • Neck pain    • Palpitations        Past Surgical History:   Procedure Laterality Date   • COLONOSCOPY  1998       Scheduled Meds:     Continuous Infusions: No current facility-administered medications for this visit.      PRN Meds:     Allergies   Allergen Reactions   • Morphine And Related Hives     HIVES AND HEADACHES       Social History     Socioeconomic History   • Marital status:      Spouse name: Not on file   • Number of children: Not on file   • Years of education: Not on file   • Highest education level:  Not on file   Tobacco Use   • Smoking status: Current Every Day Smoker     Packs/day: 1.00     Types: Cigarettes   • Smokeless tobacco: Never Used   Substance and Sexual Activity   • Alcohol use: Not Currently   • Drug use: Not Currently       History reviewed. No pertinent family history.    Review of Systems   Constitutional: Negative for activity change, appetite change, fatigue and unexpected weight change.   HENT: Negative for trouble swallowing.    Eyes: Negative.    Respiratory: Negative.    Cardiovascular: Negative.    Gastrointestinal: Positive for constipation. Negative for abdominal distention, abdominal pain, anal bleeding, blood in stool, diarrhea, nausea, rectal pain and vomiting.   Endocrine: Negative.    Genitourinary: Negative.    Musculoskeletal: Negative.    Allergic/Immunologic: Negative.    Neurological: Negative.    Hematological: Negative.    Psychiatric/Behavioral: Negative.        Vitals:    09/28/21 1110   Temp: 96.9 °F (36.1 °C)       Physical Exam  Constitutional:       Appearance: She is well-developed.   Cardiovascular:      Rate and Rhythm: Normal rate and regular rhythm.      Heart sounds: Normal heart sounds.   Pulmonary:      Effort: Pulmonary effort is normal. No respiratory distress.      Breath sounds: Normal breath sounds. No wheezing.   Abdominal:      General: Bowel sounds are normal. There is no distension.      Palpations: Abdomen is soft. There is no mass.      Tenderness: There is no abdominal tenderness. There is no guarding or rebound.      Hernia: No hernia is present.   Skin:     General: Skin is warm and dry.   Neurological:      Mental Status: She is alert and oriented to person, place, and time.   Psychiatric:         Behavior: Behavior normal.     Assessment    Diagnoses and all orders for this visit:    1. Abnormal CT scan, colon (Primary)  -     Case Request; Standing  -     Case Request    2. Constipation, unspecified constipation type    Other orders  -      linaclotide (Linzess) 72 MCG capsule capsule; Take 1 capsule by mouth Every Morning Before Breakfast for 14 days.  Dispense: 14 capsule; Refill: 0    Plan    Very pleasant 61-year-old female seen today for abnormality seen on CT as mentioned above.  Diarrhea has resolved after treatment with Flagyl.  She is back to her baseline constipation which is one bowel movement every 5 days.  She reports having a colonoscopy greater than 9 years ago had trouble prepping at the time likely secondary to underlying constipation.    At this point recommend updating colonoscopy to further evaluate CT abnormalities.  Start low-dose Linzess to improve bowel pattern, samples provided.  Patient will need our extra bowel prep per our constipation protocol.  Instructions were relayed to scheduling and patient was provided with written instructions.    Follow-up and further recommendations pending the aforementioned work-up.           BLANE Roche  [facility]  [address] [place] [address]  Office: [phone]    .diag

## 2021-10-08 ENCOUNTER — LAB REQUISITION (OUTPATIENT)
Dept: LAB | Facility: HOSPITAL | Age: 61
End: 2021-10-08

## 2021-10-08 DIAGNOSIS — Z00.00 ENCOUNTER FOR GENERAL ADULT MEDICAL EXAMINATION WITHOUT ABNORMAL FINDINGS: ICD-10-CM

## 2021-10-08 LAB — SARS-COV-2 ORF1AB RESP QL NAA+PROBE: NOT DETECTED

## 2021-10-08 PROCEDURE — U0005 INFEC AGEN DETEC AMPLI PROBE: HCPCS | Performed by: INTERNAL MEDICINE

## 2021-10-08 PROCEDURE — U0004 COV-19 TEST NON-CDC HGH THRU: HCPCS | Performed by: INTERNAL MEDICINE

## 2021-10-13 ENCOUNTER — OUTSIDE FACILITY SERVICE (OUTPATIENT)
Dept: GASTROENTEROLOGY | Facility: CLINIC | Age: 61
End: 2021-10-13

## 2021-10-13 PROCEDURE — 45380 COLONOSCOPY AND BIOPSY: CPT | Performed by: INTERNAL MEDICINE

## 2021-10-18 ENCOUNTER — TELEPHONE (OUTPATIENT)
Dept: GASTROENTEROLOGY | Facility: CLINIC | Age: 61
End: 2021-10-18

## 2021-10-18 RX ORDER — BUDESONIDE 3 MG/1
9 CAPSULE, COATED PELLETS ORAL DAILY
Qty: 90 CAPSULE | Refills: 3 | Status: SHIPPED | OUTPATIENT
Start: 2021-10-18 | End: 2021-10-18

## 2021-10-18 RX ORDER — BUDESONIDE 3 MG/1
9 CAPSULE, COATED PELLETS ORAL EVERY MORNING
Qty: 90 CAPSULE | Refills: 2 | Status: SHIPPED | OUTPATIENT
Start: 2021-10-18 | End: 2022-01-16

## 2021-10-18 NOTE — PROGRESS NOTES
This patient has collagenous colitis as a cause for her diarrheaLets start Entocort 3 mg tablets, 3 p.o. every morning, #90, 2 refillsOffice visit Angela 2 months to discuss tapering of the Entocort

## 2021-10-18 NOTE — TELEPHONE ENCOUNTER
Called pt and advised of Dr. Gipson's note.  Pt verbalized understanding.     Rx for entocort e-scribed, msg sent to Dr Gipson to cosign.     Follow up appt made with Angela GARZON 12/16/21 @1pm.

## 2021-10-18 NOTE — TELEPHONE ENCOUNTER
----- Message from Selvin Gipson MD sent at 10/18/2021 12:25 PM EDT -----  This patient has collagenous colitis as a cause for her diarrheaLets start Entocort 3 mg tablets, 3 p.o. every morning, #90, 2 refillsOffice visit Angela 2 months to discuss tapering of the Entocort

## 2021-10-25 ENCOUNTER — TELEPHONE (OUTPATIENT)
Dept: GASTROENTEROLOGY | Facility: CLINIC | Age: 61
End: 2021-10-25

## 2021-10-25 NOTE — TELEPHONE ENCOUNTER
----- Message from Gerardo Brooks sent at 10/25/2021 11:54 AM EDT -----  Regarding: medication questions  Contact: [phone]  Pt states medication is making her dizzy and nauseated budesonide 3 mg and wants direction

## 2021-10-25 NOTE — TELEPHONE ENCOUNTER
Called pt, she states she is having H/A, dizziness, and nausea.  Advised pt to hold off taking med until I send a msg to Dr Gipson.  Msg sent to Dr Gipson.

## 2021-10-25 NOTE — TELEPHONE ENCOUNTER
Selvin Meyer MD Stowers, Sharon G, RN  Caller: Unspecified (Today,  2:49 PM)  Hold medication 1 week then restart to see if the dizziness and headache returns      Called pt and advised of Dr. Gipson's note.  Pt verbalized understanding.

## 2022-01-31 ENCOUNTER — TELEPHONE (OUTPATIENT)
Dept: GASTROENTEROLOGY | Facility: CLINIC | Age: 62
End: 2022-01-31

## 2022-01-31 ENCOUNTER — OFFICE VISIT (OUTPATIENT)
Dept: GASTROENTEROLOGY | Facility: CLINIC | Age: 62
End: 2022-01-31

## 2022-01-31 VITALS — HEIGHT: 65 IN | TEMPERATURE: 95.5 F | WEIGHT: 114.5 LBS | BODY MASS INDEX: 19.08 KG/M2

## 2022-01-31 DIAGNOSIS — R93.3 ABNORMAL CT SCAN, COLON: ICD-10-CM

## 2022-01-31 DIAGNOSIS — K52.831 COLITIS, COLLAGENOUS: ICD-10-CM

## 2022-01-31 DIAGNOSIS — K59.00 CONSTIPATION, UNSPECIFIED CONSTIPATION TYPE: Primary | ICD-10-CM

## 2022-01-31 DIAGNOSIS — R10.10 PAIN OF UPPER ABDOMEN: ICD-10-CM

## 2022-01-31 DIAGNOSIS — R11.2 NON-INTRACTABLE VOMITING WITH NAUSEA, UNSPECIFIED VOMITING TYPE: ICD-10-CM

## 2022-01-31 PROBLEM — R11.10 NON-INTRACTABLE VOMITING: Status: ACTIVE | Noted: 2022-01-31

## 2022-01-31 PROCEDURE — 99214 OFFICE O/P EST MOD 30 MIN: CPT | Performed by: INTERNAL MEDICINE

## 2022-01-31 RX ORDER — ONDANSETRON HYDROCHLORIDE 8 MG/1
8 TABLET, FILM COATED ORAL EVERY 8 HOURS PRN
Qty: 90 TABLET | Refills: 3 | Status: SHIPPED | OUTPATIENT
Start: 2022-01-31 | End: 2022-09-30

## 2022-01-31 RX ORDER — TIZANIDINE 4 MG/1
2 TABLET ORAL 3 TIMES DAILY PRN
COMMUNITY
Start: 2021-12-26

## 2022-01-31 NOTE — PROGRESS NOTES
CC: Collagenous colitis and nausea and vomiting with 15 pounds of weight loss      Subjective     HPI  Meme Mcginnis is a 61 y.o. female who presents today for follow up.  Collagenous  colitis  In remission with regard to collagenous colitis but now having nausea and vomiting for 4 weeks with 15 pounds of weight loss  Her BMI is less than 20  No dysphagia  No previous EGD in her lifetime  Colonoscopy last year showed collagenous colitis    Objective   Vitals:    01/31/22 1123   Temp: 95.5 °F (35.3 °C)       Physical Exam  Vitals reviewed.   Constitutional:       Appearance: She is well-developed.   HENT:      Head: Normocephalic and atraumatic.   Abdominal:      General: Bowel sounds are normal. There is no distension.      Palpations: Abdomen is soft. There is no mass.      Tenderness: There is no abdominal tenderness.      Hernia: No hernia is present.   Skin:     General: Skin is warm and dry.   Neurological:      Mental Status: She is alert and oriented to person, place, and time.   Psychiatric:         Behavior: Behavior normal.         Thought Content: Thought content normal.         Judgment: Judgment normal.       The following data was reviewed by: Selvin Gipson MD on 01/31/2022:  CMP    CMP 4/25/21 9/11/21   Glucose 103 (A) 92   BUN 11 11   Creatinine 0.86 0.82   eGFR Non African Am 68 71   Sodium 135 (A) 140   Potassium 3.4 (A) 3.4 (A)   Chloride 96 (A) 102   Calcium 9.7 9.4   Albumin 4.70 4.40   Total Bilirubin 0.3 0.2   Alkaline Phosphatase 55 72   AST (SGOT) 18 21   ALT (SGPT) 22 17   (A) Abnormal value            CBC    CBC 4/25/21 9/11/21   WBC 5.22 5.71   RBC 4.35 4.29   Hemoglobin 13.5 13.3   Hematocrit 40.5 40.6   MCV 93.1 94.6   MCH 31.0 31.0   MCHC 33.3 32.8   RDW 12.5 12.9   Platelets 311 341           CBC w/diff    CBC w/Diff 4/25/21 9/11/21   WBC 5.22 5.71   RBC 4.35 4.29   Hemoglobin 13.5 13.3   Hematocrit 40.5 40.6   MCV 93.1 94.6   MCH 31.0 31.0   MCHC 33.3 32.8   RDW 12.5 12.9    Platelets 311 341   Neutrophil Rel % 46.8 70.9   Immature Granulocyte Rel % 0.2 0.2   Lymphocyte Rel % 42.3 21.7   Monocyte Rel % 8.0 6.1   Eosinophil Rel % 1.7 0.4   Basophil Rel % 1.0 0.7             Colonoscopy 2021 with collagenous colitis     Assessment/Plan   Assessment:       Microscopic colitis-in remission  Nausea and vomiting  Weight loss 15 pounds, greater than 10% of her body weight      Plan:     Schedule EGD  Right upper quadrant ultrasound  CBC, CMP, TSH and a lipase today  Zofran as needed for nausea and vomiting  No treatment for collagenous colitis needed at this time    I spent 40 minutes caring for Meme on this date of service. This time includes time spent by me in the following activities:preparing for the visit, reviewing tests, obtaining and/or reviewing a separately obtained history, performing a medically appropriate examination and/or evaluation , counseling and educating the patient/family/caregiver, ordering medications, tests, or procedures, referring and communicating with other health care professionals , documenting information in the medical record, independently interpreting results and communicating that information with the patient/family/caregiver and care coordination    Selvin Gipson MD  [facility]  [address] [place] [address]  Office: [phone]

## 2022-01-31 NOTE — TELEPHONE ENCOUNTER
DELFIN Wynn for EGD on 04/29/2022  arrive at 11am  . Gave Prep instructions in office.      Advised PT  that  will call with final arrival time  24 hrs before procedure. If they do not get a phone call, arrival time will stay the same as given on instructions

## 2022-02-07 ENCOUNTER — TELEPHONE (OUTPATIENT)
Dept: GASTROENTEROLOGY | Facility: CLINIC | Age: 62
End: 2022-02-07

## 2022-02-07 NOTE — TELEPHONE ENCOUNTER
Patient called. No answer. Left VM(patient identified). Reminded patient she needed to get lab work drawn. Advised she may have it drawn at any [facility] lab location or at our office. Advised to call the office at [phone] to schedule an appointment here. Advised the other places do not require appointments.

## 2022-02-18 ENCOUNTER — HOSPITAL ENCOUNTER (OUTPATIENT)
Dept: ULTRASOUND IMAGING | Facility: HOSPITAL | Age: 62
Discharge: HOME | End: 2022-02-18

## 2022-02-18 ENCOUNTER — LAB (OUTPATIENT)
Dept: LAB | Facility: HOSPITAL | Age: 62
End: 2022-02-18

## 2022-02-18 LAB
ALBUMIN SERPL-MCNC: 3.8 G/DL (ref 3.5–5.2)
ALBUMIN/GLOB SERPL: 1.4 G/DL
ALP SERPL-CCNC: 44 U/L (ref 39–117)
ALT SERPL W P-5'-P-CCNC: 11 U/L (ref 1–33)
ANION GAP SERPL CALCULATED.3IONS-SCNC: 5.4 MMOL/L (ref 5–15)
AST SERPL-CCNC: 16 U/L (ref 1–32)
BASOPHILS # BLD MANUAL: 0.05 10*3/MM3 (ref 0–0.2)
BASOPHILS NFR BLD MANUAL: 1 % (ref 0–1.5)
BILIRUB SERPL-MCNC: <0.2 MG/DL (ref 0–1.2)
BUN SERPL-MCNC: 16 MG/DL (ref 6–20)
BUN/CREAT SERPL: 20 (ref 7–25)
CALCIUM SPEC-SCNC: 9.5 MG/DL (ref 8.6–10.5)
CHLORIDE SERPL-SCNC: 103 MMOL/L (ref 98–107)
CO2 SERPL-SCNC: 32.6 MMOL/L (ref 22–29)
CREAT SERPL-MCNC: 0.8 MG/DL (ref 0.57–1)
DEPRECATED RDW RBC AUTO: 43.6 FL (ref 37–54)
EOSINOPHIL # BLD MANUAL: 0.32 10*3/MM3 (ref 0–0.4)
EOSINOPHIL NFR BLD MANUAL: 7.1 % (ref 0.3–6.2)
ERYTHROCYTE [DISTWIDTH] IN BLOOD BY AUTOMATED COUNT: 12.2 % (ref 12.3–15.4)
GFR SERPL CREATININE-BSD FRML MDRD: 73 ML/MIN/1.73
GLOBULIN UR ELPH-MCNC: 2.8 GM/DL
GLUCOSE SERPL-MCNC: 91 MG/DL (ref 65–99)
HCT VFR BLD AUTO: 38.4 % (ref 34–46.6)
HGB BLD-MCNC: 12.5 G/DL (ref 12–15.9)
LIPASE SERPL-CCNC: 21 U/L (ref 13–60)
LYMPHOCYTES # BLD MANUAL: 2.31 10*3/MM3 (ref 0.7–3.1)
LYMPHOCYTES NFR BLD MANUAL: 5.1 % (ref 5–12)
MCH RBC QN AUTO: 31.3 PG (ref 26.6–33)
MCHC RBC AUTO-ENTMCNC: 32.6 G/DL (ref 31.5–35.7)
MCV RBC AUTO: 96.2 FL (ref 79–97)
MONOCYTES # BLD: 0.23 10*3/MM3 (ref 0.1–0.9)
NEUTROPHILS # BLD AUTO: 1.62 10*3/MM3 (ref 1.7–7)
NEUTROPHILS NFR BLD MANUAL: 35.7 % (ref 42.7–76)
PLAT MORPH BLD: NORMAL
PLATELET # BLD AUTO: 238 10*3/MM3 (ref 140–450)
PMV BLD AUTO: 9.6 FL (ref 6–12)
POTASSIUM SERPL-SCNC: 5 MMOL/L (ref 3.5–5.2)
PROT SERPL-MCNC: 6.6 G/DL (ref 6–8.5)
RBC # BLD AUTO: 3.99 10*6/MM3 (ref 3.77–5.28)
RBC MORPH BLD: NORMAL
SODIUM SERPL-SCNC: 141 MMOL/L (ref 136–145)
TSH SERPL DL<=0.05 MIU/L-ACNC: 2.46 UIU/ML (ref 0.27–4.2)
VARIANT LYMPHS NFR BLD MANUAL: 1 % (ref 0–5)
VARIANT LYMPHS NFR BLD MANUAL: 50 % (ref 19.6–45.3)
WBC MORPH BLD: NORMAL
WBC NRBC COR # BLD: 4.53 10*3/MM3 (ref 3.4–10.8)

## 2022-02-18 PROCEDURE — 36415 COLL VENOUS BLD VENIPUNCTURE: CPT | Performed by: INTERNAL MEDICINE

## 2022-02-18 PROCEDURE — 85025 COMPLETE CBC W/AUTO DIFF WBC: CPT | Performed by: INTERNAL MEDICINE

## 2022-02-18 PROCEDURE — 85007 BL SMEAR W/DIFF WBC COUNT: CPT | Performed by: INTERNAL MEDICINE

## 2022-02-18 PROCEDURE — 76700 US EXAM ABDOM COMPLETE: CPT

## 2022-02-18 PROCEDURE — 84443 ASSAY THYROID STIM HORMONE: CPT | Performed by: INTERNAL MEDICINE

## 2022-02-18 PROCEDURE — 80053 COMPREHEN METABOLIC PANEL: CPT | Performed by: INTERNAL MEDICINE

## 2022-02-18 PROCEDURE — 83690 ASSAY OF LIPASE: CPT | Performed by: INTERNAL MEDICINE

## 2022-02-21 ENCOUNTER — TELEPHONE (OUTPATIENT)
Dept: GASTROENTEROLOGY | Facility: CLINIC | Age: 62
End: 2022-02-21

## 2022-02-21 NOTE — TELEPHONE ENCOUNTER
----- Message from Selvin Gipson MD sent at 2/21/2022 11:24 AM EST -----  right upper quadrant ultrasound normal

## 2022-02-24 ENCOUNTER — TELEPHONE (OUTPATIENT)
Dept: GASTROENTEROLOGY | Facility: CLINIC | Age: 62
End: 2022-02-24

## 2022-02-24 NOTE — TELEPHONE ENCOUNTER
----- Message from Gerardo Burciaga sent at 2/24/2022 11:14 AM EST -----  Regarding: Lab Results  Contact: [phone]  PT called to discuss lab results. Would like a call back from the doctor or nurse. PT wants to know if her results warrant a follow up visit. Please call: [phone]

## 2022-04-21 ENCOUNTER — TRANSCRIBE ORDERS (OUTPATIENT)
Dept: ADMINISTRATIVE | Facility: HOSPITAL | Age: 62
End: 2022-04-21

## 2022-04-21 DIAGNOSIS — Z01.818 OTHER SPECIFIED PRE-OPERATIVE EXAMINATION: Primary | ICD-10-CM

## 2022-04-22 ENCOUNTER — TELEPHONE (OUTPATIENT)
Dept: GASTROENTEROLOGY | Facility: CLINIC | Age: 62
End: 2022-04-22

## 2022-04-22 DIAGNOSIS — Z01.818 PRE-OP TESTING: Primary | ICD-10-CM

## 2022-04-27 ENCOUNTER — LAB (OUTPATIENT)
Dept: LAB | Facility: HOSPITAL | Age: 62
End: 2022-04-27

## 2022-04-27 DIAGNOSIS — Z01.818 OTHER SPECIFIED PRE-OPERATIVE EXAMINATION: ICD-10-CM

## 2022-04-27 LAB — SARS-COV-2 ORF1AB RESP QL NAA+PROBE: NOT DETECTED

## 2022-04-27 PROCEDURE — U0004 COV-19 TEST NON-CDC HGH THRU: HCPCS

## 2022-04-27 PROCEDURE — C9803 HOPD COVID-19 SPEC COLLECT: HCPCS

## 2022-09-30 ENCOUNTER — APPOINTMENT (OUTPATIENT)
Dept: GENERAL RADIOLOGY | Facility: HOSPITAL | Age: 62
End: 2022-09-30

## 2022-09-30 ENCOUNTER — HOSPITAL ENCOUNTER (OUTPATIENT)
Facility: HOSPITAL | Age: 62
Setting detail: OBSERVATION
Discharge: HOME | End: 2022-10-04
Attending: EMERGENCY MEDICINE | Admitting: HOSPITALIST

## 2022-09-30 ENCOUNTER — APPOINTMENT (OUTPATIENT)
Dept: CT IMAGING | Facility: HOSPITAL | Age: 62
End: 2022-09-30

## 2022-09-30 DIAGNOSIS — I26.99 ACUTE PULMONARY EMBOLISM WITHOUT ACUTE COR PULMONALE, UNSPECIFIED PULMONARY EMBOLISM TYPE: ICD-10-CM

## 2022-09-30 DIAGNOSIS — R07.81 PLEURITIC CHEST PAIN: Primary | ICD-10-CM

## 2022-09-30 LAB
ALBUMIN SERPL-MCNC: 4.7 G/DL (ref 3.5–5.2)
ALBUMIN/GLOB SERPL: 2.4 G/DL
ALP SERPL-CCNC: 57 U/L (ref 39–117)
ALT SERPL W P-5'-P-CCNC: 16 U/L (ref 1–33)
ANION GAP SERPL CALCULATED.3IONS-SCNC: 9.9 MMOL/L (ref 5–15)
AST SERPL-CCNC: 25 U/L (ref 1–32)
B PARAPERT DNA SPEC QL NAA+PROBE: NOT DETECTED
B PERT DNA SPEC QL NAA+PROBE: NOT DETECTED
BASOPHILS # BLD AUTO: 0.04 10*3/MM3 (ref 0–0.2)
BASOPHILS NFR BLD AUTO: 0.7 % (ref 0–1.5)
BILIRUB SERPL-MCNC: <0.2 MG/DL (ref 0–1.2)
BUN SERPL-MCNC: 13 MG/DL (ref 8–23)
BUN/CREAT SERPL: 16.3 (ref 7–25)
C PNEUM DNA NPH QL NAA+NON-PROBE: NOT DETECTED
CALCIUM SPEC-SCNC: 9.6 MG/DL (ref 8.6–10.5)
CHLORIDE SERPL-SCNC: 99 MMOL/L (ref 98–107)
CO2 SERPL-SCNC: 28.1 MMOL/L (ref 22–29)
CREAT SERPL-MCNC: 0.8 MG/DL (ref 0.57–1)
D DIMER PPP FEU-MCNC: 2.17 MCGFEU/ML (ref 0–0.49)
DEPRECATED RDW RBC AUTO: 49.6 FL (ref 37–54)
EGFRCR SERPLBLD CKD-EPI 2021: 84.5 ML/MIN/1.73
EOSINOPHIL # BLD AUTO: 0.28 10*3/MM3 (ref 0–0.4)
EOSINOPHIL NFR BLD AUTO: 5.1 % (ref 0.3–6.2)
ERYTHROCYTE [DISTWIDTH] IN BLOOD BY AUTOMATED COUNT: 14.5 % (ref 12.3–15.4)
FLUAV SUBTYP SPEC NAA+PROBE: NOT DETECTED
FLUBV RNA ISLT QL NAA+PROBE: NOT DETECTED
GLOBULIN UR ELPH-MCNC: 2 GM/DL
GLUCOSE SERPL-MCNC: 98 MG/DL (ref 65–99)
HADV DNA SPEC NAA+PROBE: NOT DETECTED
HCOV 229E RNA SPEC QL NAA+PROBE: NOT DETECTED
HCOV HKU1 RNA SPEC QL NAA+PROBE: NOT DETECTED
HCOV NL63 RNA SPEC QL NAA+PROBE: NOT DETECTED
HCOV OC43 RNA SPEC QL NAA+PROBE: NOT DETECTED
HCT VFR BLD AUTO: 38.2 % (ref 34–46.6)
HGB BLD-MCNC: 12.8 G/DL (ref 12–15.9)
HMPV RNA NPH QL NAA+NON-PROBE: NOT DETECTED
HPIV1 RNA ISLT QL NAA+PROBE: NOT DETECTED
HPIV2 RNA SPEC QL NAA+PROBE: NOT DETECTED
HPIV3 RNA NPH QL NAA+PROBE: NOT DETECTED
HPIV4 P GENE NPH QL NAA+PROBE: NOT DETECTED
IMM GRANULOCYTES # BLD AUTO: 0.01 10*3/MM3 (ref 0–0.05)
IMM GRANULOCYTES NFR BLD AUTO: 0.2 % (ref 0–0.5)
LYMPHOCYTES # BLD AUTO: 1.8 10*3/MM3 (ref 0.7–3.1)
LYMPHOCYTES NFR BLD AUTO: 33 % (ref 19.6–45.3)
M PNEUMO IGG SER IA-ACNC: NOT DETECTED
MCH RBC QN AUTO: 31.6 PG (ref 26.6–33)
MCHC RBC AUTO-ENTMCNC: 33.5 G/DL (ref 31.5–35.7)
MCV RBC AUTO: 94.3 FL (ref 79–97)
MONOCYTES # BLD AUTO: 0.42 10*3/MM3 (ref 0.1–0.9)
MONOCYTES NFR BLD AUTO: 7.7 % (ref 5–12)
NEUTROPHILS NFR BLD AUTO: 2.9 10*3/MM3 (ref 1.7–7)
NEUTROPHILS NFR BLD AUTO: 53.3 % (ref 42.7–76)
NRBC BLD AUTO-RTO: 0 /100 WBC (ref 0–0.2)
NT-PROBNP SERPL-MCNC: 211 PG/ML (ref 0–900)
PLATELET # BLD AUTO: 324 10*3/MM3 (ref 140–450)
PMV BLD AUTO: 9 FL (ref 6–12)
POTASSIUM SERPL-SCNC: 4 MMOL/L (ref 3.5–5.2)
PROT SERPL-MCNC: 6.7 G/DL (ref 6–8.5)
QT INTERVAL: 414 MS
RBC # BLD AUTO: 4.05 10*6/MM3 (ref 3.77–5.28)
RHINOVIRUS RNA SPEC NAA+PROBE: NOT DETECTED
RSV RNA NPH QL NAA+NON-PROBE: NOT DETECTED
SARS-COV-2 RNA NPH QL NAA+NON-PROBE: NOT DETECTED
SODIUM SERPL-SCNC: 137 MMOL/L (ref 136–145)
TROPONIN T SERPL-MCNC: <0.01 NG/ML (ref 0–0.03)
WBC NRBC COR # BLD: 5.45 10*3/MM3 (ref 3.4–10.8)

## 2022-09-30 PROCEDURE — 94640 AIRWAY INHALATION TREATMENT: CPT

## 2022-09-30 PROCEDURE — 85025 COMPLETE CBC W/AUTO DIFF WBC: CPT | Performed by: EMERGENCY MEDICINE

## 2022-09-30 PROCEDURE — 94761 N-INVAS EAR/PLS OXIMETRY MLT: CPT

## 2022-09-30 PROCEDURE — 94799 UNLISTED PULMONARY SVC/PX: CPT

## 2022-09-30 PROCEDURE — 99284 EMERGENCY DEPT VISIT MOD MDM: CPT

## 2022-09-30 PROCEDURE — G0378 HOSPITAL OBSERVATION PER HR: HCPCS

## 2022-09-30 PROCEDURE — 84484 ASSAY OF TROPONIN QUANT: CPT | Performed by: EMERGENCY MEDICINE

## 2022-09-30 PROCEDURE — 85379 FIBRIN DEGRADATION QUANT: CPT | Performed by: EMERGENCY MEDICINE

## 2022-09-30 PROCEDURE — 93010 ELECTROCARDIOGRAM REPORT: CPT | Performed by: INTERNAL MEDICINE

## 2022-09-30 PROCEDURE — 0 IOPAMIDOL PER 1 ML: Performed by: EMERGENCY MEDICINE

## 2022-09-30 PROCEDURE — 71045 X-RAY EXAM CHEST 1 VIEW: CPT

## 2022-09-30 PROCEDURE — 93005 ELECTROCARDIOGRAM TRACING: CPT | Performed by: EMERGENCY MEDICINE

## 2022-09-30 PROCEDURE — 25010000002 KETOROLAC TROMETHAMINE PER 15 MG: Performed by: EMERGENCY MEDICINE

## 2022-09-30 PROCEDURE — 0202U NFCT DS 22 TRGT SARS-COV-2: CPT | Performed by: INTERNAL MEDICINE

## 2022-09-30 PROCEDURE — 96372 THER/PROPH/DIAG INJ SC/IM: CPT

## 2022-09-30 PROCEDURE — 71275 CT ANGIOGRAPHY CHEST: CPT

## 2022-09-30 PROCEDURE — 80053 COMPREHEN METABOLIC PANEL: CPT | Performed by: EMERGENCY MEDICINE

## 2022-09-30 PROCEDURE — 83880 ASSAY OF NATRIURETIC PEPTIDE: CPT | Performed by: EMERGENCY MEDICINE

## 2022-09-30 PROCEDURE — 96374 THER/PROPH/DIAG INJ IV PUSH: CPT

## 2022-09-30 PROCEDURE — 94760 N-INVAS EAR/PLS OXIMETRY 1: CPT

## 2022-09-30 PROCEDURE — 25010000002 ENOXAPARIN PER 10 MG: Performed by: EMERGENCY MEDICINE

## 2022-09-30 RX ORDER — ONDANSETRON 2 MG/ML
4 INJECTION INTRAMUSCULAR; INTRAVENOUS EVERY 6 HOURS PRN
Status: DISCONTINUED | OUTPATIENT
Start: 2022-09-30 | End: 2022-10-04 | Stop reason: HOSPADM

## 2022-09-30 RX ORDER — SODIUM CHLORIDE 0.9 % (FLUSH) 0.9 %
10 SYRINGE (ML) INJECTION AS NEEDED
Status: DISCONTINUED | OUTPATIENT
Start: 2022-09-30 | End: 2022-10-04 | Stop reason: HOSPADM

## 2022-09-30 RX ORDER — ENOXAPARIN SODIUM 100 MG/ML
1 INJECTION SUBCUTANEOUS EVERY 12 HOURS
Status: DISCONTINUED | OUTPATIENT
Start: 2022-10-01 | End: 2022-10-01

## 2022-09-30 RX ORDER — DICLOFENAC POTASSIUM 50 MG/1
50 TABLET, FILM COATED ORAL 3 TIMES DAILY
COMMUNITY

## 2022-09-30 RX ORDER — ALPRAZOLAM 1 MG/1
1 TABLET ORAL EVERY EVENING
Status: DISCONTINUED | OUTPATIENT
Start: 2022-09-30 | End: 2022-10-04 | Stop reason: HOSPADM

## 2022-09-30 RX ORDER — ENOXAPARIN SODIUM 100 MG/ML
1 INJECTION SUBCUTANEOUS ONCE
Status: COMPLETED | OUTPATIENT
Start: 2022-09-30 | End: 2022-09-30

## 2022-09-30 RX ORDER — FENTANYL 50 UG/H
1 PATCH TRANSDERMAL
Status: DISCONTINUED | OUTPATIENT
Start: 2022-10-03 | End: 2022-10-04 | Stop reason: HOSPADM

## 2022-09-30 RX ORDER — NITROGLYCERIN 0.4 MG/1
0.4 TABLET SUBLINGUAL
Status: DISCONTINUED | OUTPATIENT
Start: 2022-09-30 | End: 2022-10-04 | Stop reason: HOSPADM

## 2022-09-30 RX ORDER — IPRATROPIUM BROMIDE AND ALBUTEROL SULFATE 2.5; .5 MG/3ML; MG/3ML
3 SOLUTION RESPIRATORY (INHALATION)
Status: DISCONTINUED | OUTPATIENT
Start: 2022-09-30 | End: 2022-10-04 | Stop reason: HOSPADM

## 2022-09-30 RX ORDER — COLCHICINE 0.6 MG/1
0.6 TABLET ORAL EVERY 12 HOURS SCHEDULED
Status: DISCONTINUED | OUTPATIENT
Start: 2022-09-30 | End: 2022-10-03

## 2022-09-30 RX ORDER — CITALOPRAM 20 MG/1
20 TABLET ORAL EVERY EVENING
Status: DISCONTINUED | OUTPATIENT
Start: 2022-09-30 | End: 2022-10-04 | Stop reason: HOSPADM

## 2022-09-30 RX ORDER — TIZANIDINE 4 MG/1
2 TABLET ORAL 3 TIMES DAILY PRN
Status: DISCONTINUED | OUTPATIENT
Start: 2022-09-30 | End: 2022-10-04 | Stop reason: HOSPADM

## 2022-09-30 RX ORDER — SODIUM CHLORIDE 0.9 % (FLUSH) 0.9 %
10 SYRINGE (ML) INJECTION EVERY 12 HOURS SCHEDULED
Status: DISCONTINUED | OUTPATIENT
Start: 2022-09-30 | End: 2022-10-04 | Stop reason: HOSPADM

## 2022-09-30 RX ORDER — KETOROLAC TROMETHAMINE 15 MG/ML
15 INJECTION, SOLUTION INTRAMUSCULAR; INTRAVENOUS ONCE
Status: COMPLETED | OUTPATIENT
Start: 2022-09-30 | End: 2022-09-30

## 2022-09-30 RX ORDER — HYDROCODONE BITARTRATE AND ACETAMINOPHEN 10; 325 MG/1; MG/1
1 TABLET ORAL EVERY 6 HOURS PRN
Status: DISCONTINUED | OUTPATIENT
Start: 2022-09-30 | End: 2022-10-04 | Stop reason: HOSPADM

## 2022-09-30 RX ADMIN — CITALOPRAM 20 MG: 20 TABLET, FILM COATED ORAL at 22:05

## 2022-09-30 RX ADMIN — COLCHICINE 0.6 MG: 0.6 TABLET, FILM COATED ORAL at 23:09

## 2022-09-30 RX ADMIN — ENOXAPARIN SODIUM 50 MG: 100 INJECTION SUBCUTANEOUS at 17:33

## 2022-09-30 RX ADMIN — Medication 10 ML: at 22:06

## 2022-09-30 RX ADMIN — IOPAMIDOL 100 ML: 755 INJECTION, SOLUTION INTRAVENOUS at 15:08

## 2022-09-30 RX ADMIN — ALPRAZOLAM 1 MG: 1 TABLET ORAL at 22:06

## 2022-09-30 RX ADMIN — HYDROCODONE BITARTRATE AND ACETAMINOPHEN 1 TABLET: 10; 325 TABLET ORAL at 22:09

## 2022-09-30 RX ADMIN — KETOROLAC TROMETHAMINE 15 MG: 15 INJECTION, SOLUTION INTRAMUSCULAR; INTRAVENOUS at 14:49

## 2022-09-30 RX ADMIN — IPRATROPIUM BROMIDE AND ALBUTEROL SULFATE 3 ML: .5; 3 SOLUTION RESPIRATORY (INHALATION) at 21:32

## 2022-10-01 ENCOUNTER — APPOINTMENT (OUTPATIENT)
Dept: CARDIOLOGY | Facility: HOSPITAL | Age: 62
End: 2022-10-01

## 2022-10-01 LAB
ALBUMIN SERPL-MCNC: 3.9 G/DL (ref 3.5–5.2)
ALBUMIN/GLOB SERPL: 1.7 G/DL
ALP SERPL-CCNC: 53 U/L (ref 39–117)
ALT SERPL W P-5'-P-CCNC: 15 U/L (ref 1–33)
ANION GAP SERPL CALCULATED.3IONS-SCNC: 10.3 MMOL/L (ref 5–15)
AST SERPL-CCNC: 23 U/L (ref 1–32)
BASOPHILS # BLD AUTO: 0.03 10*3/MM3 (ref 0–0.2)
BASOPHILS NFR BLD AUTO: 0.7 % (ref 0–1.5)
BH CV LOWER VASCULAR LEFT COMMON FEMORAL AUGMENT: NORMAL
BH CV LOWER VASCULAR LEFT COMMON FEMORAL COMPETENT: NORMAL
BH CV LOWER VASCULAR LEFT COMMON FEMORAL COMPRESS: NORMAL
BH CV LOWER VASCULAR LEFT COMMON FEMORAL PHASIC: NORMAL
BH CV LOWER VASCULAR LEFT COMMON FEMORAL SPONT: NORMAL
BH CV LOWER VASCULAR LEFT DISTAL FEMORAL COMPRESS: NORMAL
BH CV LOWER VASCULAR LEFT GASTRONEMIUS COMPRESS: NORMAL
BH CV LOWER VASCULAR LEFT GREATER SAPH AK COMPRESS: NORMAL
BH CV LOWER VASCULAR LEFT GREATER SAPH BK COMPRESS: NORMAL
BH CV LOWER VASCULAR LEFT LESSER SAPH COMPRESS: NORMAL
BH CV LOWER VASCULAR LEFT MID FEMORAL AUGMENT: NORMAL
BH CV LOWER VASCULAR LEFT MID FEMORAL COMPETENT: NORMAL
BH CV LOWER VASCULAR LEFT MID FEMORAL COMPRESS: NORMAL
BH CV LOWER VASCULAR LEFT MID FEMORAL PHASIC: NORMAL
BH CV LOWER VASCULAR LEFT MID FEMORAL SPONT: NORMAL
BH CV LOWER VASCULAR LEFT PERONEAL COMPRESS: NORMAL
BH CV LOWER VASCULAR LEFT POPLITEAL AUGMENT: NORMAL
BH CV LOWER VASCULAR LEFT POPLITEAL COMPETENT: NORMAL
BH CV LOWER VASCULAR LEFT POPLITEAL COMPRESS: NORMAL
BH CV LOWER VASCULAR LEFT POPLITEAL PHASIC: NORMAL
BH CV LOWER VASCULAR LEFT POPLITEAL SPONT: NORMAL
BH CV LOWER VASCULAR LEFT POSTERIOR TIBIAL COMPRESS: NORMAL
BH CV LOWER VASCULAR LEFT PROFUNDA FEMORAL COMPRESS: NORMAL
BH CV LOWER VASCULAR LEFT PROXIMAL FEMORAL COMPRESS: NORMAL
BH CV LOWER VASCULAR LEFT SAPHENOFEMORAL JUNCTION COMPRESS: NORMAL
BH CV LOWER VASCULAR RIGHT COMMON FEMORAL AUGMENT: NORMAL
BH CV LOWER VASCULAR RIGHT COMMON FEMORAL COMPETENT: NORMAL
BH CV LOWER VASCULAR RIGHT COMMON FEMORAL COMPRESS: NORMAL
BH CV LOWER VASCULAR RIGHT COMMON FEMORAL PHASIC: NORMAL
BH CV LOWER VASCULAR RIGHT COMMON FEMORAL SPONT: NORMAL
BH CV LOWER VASCULAR RIGHT DISTAL FEMORAL COMPRESS: NORMAL
BH CV LOWER VASCULAR RIGHT GASTRONEMIUS COMPRESS: NORMAL
BH CV LOWER VASCULAR RIGHT GREATER SAPH AK COMPRESS: NORMAL
BH CV LOWER VASCULAR RIGHT GREATER SAPH BK COMPRESS: NORMAL
BH CV LOWER VASCULAR RIGHT LESSER SAPH COMPRESS: NORMAL
BH CV LOWER VASCULAR RIGHT MID FEMORAL AUGMENT: NORMAL
BH CV LOWER VASCULAR RIGHT MID FEMORAL COMPETENT: NORMAL
BH CV LOWER VASCULAR RIGHT MID FEMORAL COMPRESS: NORMAL
BH CV LOWER VASCULAR RIGHT MID FEMORAL PHASIC: NORMAL
BH CV LOWER VASCULAR RIGHT MID FEMORAL SPONT: NORMAL
BH CV LOWER VASCULAR RIGHT PERONEAL COMPRESS: NORMAL
BH CV LOWER VASCULAR RIGHT POPLITEAL AUGMENT: NORMAL
BH CV LOWER VASCULAR RIGHT POPLITEAL COMPETENT: NORMAL
BH CV LOWER VASCULAR RIGHT POPLITEAL COMPRESS: NORMAL
BH CV LOWER VASCULAR RIGHT POPLITEAL PHASIC: NORMAL
BH CV LOWER VASCULAR RIGHT POPLITEAL SPONT: NORMAL
BH CV LOWER VASCULAR RIGHT POSTERIOR TIBIAL COMPRESS: NORMAL
BH CV LOWER VASCULAR RIGHT PROFUNDA FEMORAL COMPRESS: NORMAL
BH CV LOWER VASCULAR RIGHT PROXIMAL FEMORAL COMPRESS: NORMAL
BH CV LOWER VASCULAR RIGHT SAPHENOFEMORAL JUNCTION COMPRESS: NORMAL
BILIRUB SERPL-MCNC: 0.2 MG/DL (ref 0–1.2)
BUN SERPL-MCNC: 15 MG/DL (ref 8–23)
BUN/CREAT SERPL: 18.8 (ref 7–25)
CALCIUM SPEC-SCNC: 8.8 MG/DL (ref 8.6–10.5)
CHLORIDE SERPL-SCNC: 96 MMOL/L (ref 98–107)
CO2 SERPL-SCNC: 29.7 MMOL/L (ref 22–29)
CREAT SERPL-MCNC: 0.8 MG/DL (ref 0.57–1)
DEPRECATED RDW RBC AUTO: 52.6 FL (ref 37–54)
EGFRCR SERPLBLD CKD-EPI 2021: 84.5 ML/MIN/1.73
EOSINOPHIL # BLD AUTO: 0.3 10*3/MM3 (ref 0–0.4)
EOSINOPHIL NFR BLD AUTO: 6.8 % (ref 0.3–6.2)
ERYTHROCYTE [DISTWIDTH] IN BLOOD BY AUTOMATED COUNT: 14.6 % (ref 12.3–15.4)
GLOBULIN UR ELPH-MCNC: 2.3 GM/DL
GLUCOSE SERPL-MCNC: 111 MG/DL (ref 65–99)
HCT VFR BLD AUTO: 37.5 % (ref 34–46.6)
HGB BLD-MCNC: 12 G/DL (ref 12–15.9)
IMM GRANULOCYTES # BLD AUTO: 0 10*3/MM3 (ref 0–0.05)
IMM GRANULOCYTES NFR BLD AUTO: 0 % (ref 0–0.5)
LYMPHOCYTES # BLD AUTO: 1.82 10*3/MM3 (ref 0.7–3.1)
LYMPHOCYTES NFR BLD AUTO: 41.5 % (ref 19.6–45.3)
MAXIMAL PREDICTED HEART RATE: 160 BPM
MCH RBC QN AUTO: 30.8 PG (ref 26.6–33)
MCHC RBC AUTO-ENTMCNC: 32 G/DL (ref 31.5–35.7)
MCV RBC AUTO: 96.4 FL (ref 79–97)
MONOCYTES # BLD AUTO: 0.39 10*3/MM3 (ref 0.1–0.9)
MONOCYTES NFR BLD AUTO: 8.9 % (ref 5–12)
NEUTROPHILS NFR BLD AUTO: 1.85 10*3/MM3 (ref 1.7–7)
NEUTROPHILS NFR BLD AUTO: 42.1 % (ref 42.7–76)
NRBC BLD AUTO-RTO: 0 /100 WBC (ref 0–0.2)
PLATELET # BLD AUTO: 281 10*3/MM3 (ref 140–450)
PMV BLD AUTO: 9.4 FL (ref 6–12)
POTASSIUM SERPL-SCNC: 4.6 MMOL/L (ref 3.5–5.2)
PROT SERPL-MCNC: 6.2 G/DL (ref 6–8.5)
QT INTERVAL: 438 MS
RBC # BLD AUTO: 3.89 10*6/MM3 (ref 3.77–5.28)
SODIUM SERPL-SCNC: 136 MMOL/L (ref 136–145)
STRESS TARGET HR: 136 BPM
TROPONIN T SERPL-MCNC: <0.01 NG/ML (ref 0–0.03)
WBC NRBC COR # BLD: 4.39 10*3/MM3 (ref 3.4–10.8)

## 2022-10-01 PROCEDURE — 84484 ASSAY OF TROPONIN QUANT: CPT | Performed by: INTERNAL MEDICINE

## 2022-10-01 PROCEDURE — 76376 3D RENDER W/INTRP POSTPROCES: CPT | Performed by: INTERNAL MEDICINE

## 2022-10-01 PROCEDURE — 93005 ELECTROCARDIOGRAM TRACING: CPT | Performed by: INTERNAL MEDICINE

## 2022-10-01 PROCEDURE — 94664 DEMO&/EVAL PT USE INHALER: CPT

## 2022-10-01 PROCEDURE — 93306 TTE W/DOPPLER COMPLETE: CPT

## 2022-10-01 PROCEDURE — 93306 TTE W/DOPPLER COMPLETE: CPT | Performed by: INTERNAL MEDICINE

## 2022-10-01 PROCEDURE — 94799 UNLISTED PULMONARY SVC/PX: CPT

## 2022-10-01 PROCEDURE — 93010 ELECTROCARDIOGRAM REPORT: CPT | Performed by: INTERNAL MEDICINE

## 2022-10-01 PROCEDURE — 80053 COMPREHEN METABOLIC PANEL: CPT | Performed by: INTERNAL MEDICINE

## 2022-10-01 PROCEDURE — 85025 COMPLETE CBC W/AUTO DIFF WBC: CPT | Performed by: INTERNAL MEDICINE

## 2022-10-01 PROCEDURE — 63710000001 PREDNISONE PER 1 MG: Performed by: INTERNAL MEDICINE

## 2022-10-01 PROCEDURE — G0378 HOSPITAL OBSERVATION PER HR: HCPCS

## 2022-10-01 PROCEDURE — 93970 EXTREMITY STUDY: CPT

## 2022-10-01 PROCEDURE — 94761 N-INVAS EAR/PLS OXIMETRY MLT: CPT

## 2022-10-01 RX ORDER — ENOXAPARIN SODIUM 100 MG/ML
40 INJECTION SUBCUTANEOUS EVERY 24 HOURS
Status: DISCONTINUED | OUTPATIENT
Start: 2022-10-02 | End: 2022-10-04 | Stop reason: HOSPADM

## 2022-10-01 RX ORDER — PREDNISONE 20 MG/1
40 TABLET ORAL DAILY
Status: DISCONTINUED | OUTPATIENT
Start: 2022-10-01 | End: 2022-10-02

## 2022-10-01 RX ADMIN — Medication 10 ML: at 08:21

## 2022-10-01 RX ADMIN — TIZANIDINE 2 MG: 4 TABLET ORAL at 04:57

## 2022-10-01 RX ADMIN — COLCHICINE 0.6 MG: 0.6 TABLET, FILM COATED ORAL at 08:17

## 2022-10-01 RX ADMIN — IPRATROPIUM BROMIDE AND ALBUTEROL SULFATE 3 ML: .5; 3 SOLUTION RESPIRATORY (INHALATION) at 07:00

## 2022-10-01 RX ADMIN — PREDNISONE 40 MG: 20 TABLET ORAL at 17:02

## 2022-10-01 RX ADMIN — HYDROCODONE BITARTRATE AND ACETAMINOPHEN 1 TABLET: 10; 325 TABLET ORAL at 04:57

## 2022-10-01 RX ADMIN — COLCHICINE 0.6 MG: 0.6 TABLET, FILM COATED ORAL at 20:32

## 2022-10-01 RX ADMIN — CITALOPRAM 20 MG: 20 TABLET, FILM COATED ORAL at 17:03

## 2022-10-01 RX ADMIN — IPRATROPIUM BROMIDE AND ALBUTEROL SULFATE 3 ML: .5; 3 SOLUTION RESPIRATORY (INHALATION) at 10:58

## 2022-10-01 RX ADMIN — HYDROCODONE BITARTRATE AND ACETAMINOPHEN 1 TABLET: 10; 325 TABLET ORAL at 18:29

## 2022-10-01 RX ADMIN — IPRATROPIUM BROMIDE AND ALBUTEROL SULFATE 3 ML: .5; 3 SOLUTION RESPIRATORY (INHALATION) at 14:54

## 2022-10-01 RX ADMIN — ALPRAZOLAM 1 MG: 1 TABLET ORAL at 17:03

## 2022-10-01 RX ADMIN — HYDROCODONE BITARTRATE AND ACETAMINOPHEN 1 TABLET: 10; 325 TABLET ORAL at 10:54

## 2022-10-01 RX ADMIN — Medication 10 ML: at 20:32

## 2022-10-01 NOTE — PLAN OF CARE
Goal Outcome Evaluation:  Plan of Care Reviewed With: patient        Progress: no change  Outcome Evaluation: Pt continues to have left sided pain with radiation to shoulder and mid-back. Medicated as needed. Up ad-marycarmen in room. ECHO performed. Appetite fair. VSS WCTM

## 2022-10-01 NOTE — PROGRESS NOTES
Name: Meme Mcginnis ADMIT: 2022   : 1960  PCP: Radu Rawls MD    MRN: [id number] LOS: 0 days   AGE/SEX: 62 y.o. female  ROOM: [facility]     Subjective   Subjective     Patient is lying in the bed and continues to complain of pain on the left side of her chest and only worse with cough, deep breath, movement.  Denies nausea, vomiting, abdominal pain, shortness of breath.       Objective   Objective   Vital Signs  Temp:  [97.5 °F (36.4 °C)-98.2 °F (36.8 °C)] 97.5 °F (36.4 °C)  Heart Rate:  [] 88  Resp:  [16-20] 18  BP: ()/(55-85) 104/63  SpO2:  [95 %-100 %] 96 %  on   ;   Device (Oxygen Therapy): room air  Body mass index is 19.64 kg/m².  Physical Exam  Constitutional:       General: She is not in acute distress.     Appearance: Normal appearance.   HENT:      Head: Normocephalic and atraumatic.      Mouth/Throat:      Mouth: Mucous membranes are dry.   Eyes:      Extraocular Movements: Extraocular movements intact.      Conjunctiva/sclera: Conjunctivae normal.      Pupils: Pupils are equal, round, and reactive to light.   Cardiovascular:      Rate and Rhythm: Normal rate and regular rhythm.      Pulses: Normal pulses.      Heart sounds: Normal heart sounds.   Pulmonary:      Effort: Pulmonary effort is normal.      Breath sounds: Normal breath sounds.   Abdominal:      General: Bowel sounds are normal. There is no distension.      Palpations: Abdomen is soft.   Musculoskeletal:      Cervical back: Normal range of motion and neck supple.      Right lower leg: No edema.      Left lower leg: No edema.   Skin:     General: Skin is warm and dry.   Neurological:      General: No focal deficit present.      Mental Status: She is alert and oriented to person, place, and time.   Psychiatric:         Mood and Affect: Mood normal.         Behavior: Behavior normal.       Results Review     I reviewed the patient's new clinical results.  Results from last 7 days   Lab Units 10/01/22  0841  09/30/22  1424   WBC 10*3/mm3 4.39 5.45   HEMOGLOBIN g/dL 12.0 12.8   PLATELETS 10*3/mm3 281 324     Results from last 7 days   Lab Units 10/01/22  0836 09/30/22  1424   SODIUM mmol/L 136 137   POTASSIUM mmol/L 4.6 4.0   CHLORIDE mmol/L 96* 99   CO2 mmol/L 29.7* 28.1   BUN mg/dL 15 13   CREATININE mg/dL 0.80 0.80   GLUCOSE mg/dL 111* 98   EGFR mL/min/1.73 84.5 84.5     Results from last 7 days   Lab Units 10/01/22  0836 09/30/22  1424   ALBUMIN g/dL 3.90 4.70   BILIRUBIN mg/dL 0.2 <0.2   ALK PHOS U/L 53 57   AST (SGOT) U/L 23 25   ALT (SGPT) U/L 15 16     Results from last 7 days   Lab Units 10/01/22  0836 09/30/22  1424   CALCIUM mg/dL 8.8 9.6   ALBUMIN g/dL 3.90 4.70       No results found for: HGBA1C, POCGLU    CT Angiogram Chest    Result Date: 9/30/2022  1. There is no convincing evidence for pulmonary thromboemboli. However, there is essentially no opacification of the very distal pulmonary artery branches and tiny distal pulmonary thromboemboli, although not considered to be likely, cannot be entirely excluded. 2. Reticular nodular opacities at the posterior aspect of the right lower lobe were not present previously and are suspected to represent acute bronchiolitis. 3. Annual screening low-dose chest CT is recommended.  Discussed with Dr. Zepeda.      Scheduled Medications  ALPRAZolam, 1 mg, Oral, Q PM  citalopram, 20 mg, Oral, Q PM  colchicine, 0.6 mg, Oral, Q12H  enoxaparin, 1 mg/kg, Subcutaneous, Q12H  [START ON 10/3/2022] fentaNYL, 1 patch, Transdermal, Q72H  ipratropium-albuterol, 3 mL, Nebulization, 4x Daily - RT  sodium chloride, 10 mL, Intravenous, Q12H    Infusions   Diet  Diet Regular       Assessment/Plan     Active Hospital Problems    Diagnosis  POA   • **Pleuritic chest pain [R07.81]  Yes   • DDD (degenerative disc disease), cervical [M50.30]  Yes   • DDD (degenerative disc disease), lumbar [M51.36]  Unknown   • Depression [F32.A]  Unknown   • Pleurisy [R09.1]  Unknown   • Colitis,  collagenous [K52.831]  Yes      Resolved Hospital Problems   No resolved problems to display.       62 y.o. female admitted with Pleuritic chest pain.    #1 pleuritic chest pain, CT chest with contrast with no evidence of any pulmonary embolism or pneumonia.  We will continue with colchicine and prednisone will also be initiated.  Lovenox will be changed to DVT prophylaxis dose.  2.  Anxiety, on alprazolam.  3.  On Lovenox for DVT prophylaxis.  4.  CODE STATUS is full code.    Gwyn Rae MD  [facility]  10/01/22  15:11 EDT

## 2022-10-01 NOTE — PLAN OF CARE
Goal Outcome Evaluation:               Patient admitted with ongoing chest pain. Troponin negative. Ddimer elevated. CTA negative. Respiratory viral panel negative. Echo ordered for today. Patient is up adlib. A&Ox4. PRN pain medication given once. No complaints otherwise. VSS. Will continue to monitor.

## 2022-10-02 LAB
ANION GAP SERPL CALCULATED.3IONS-SCNC: 11 MMOL/L (ref 5–15)
AORTIC ARCH: 2.4 CM
BASOPHILS # BLD AUTO: 0 10*3/MM3 (ref 0–0.2)
BASOPHILS NFR BLD AUTO: 0 % (ref 0–1.5)
BH CV ECHO MEAS - ACS: 2.01 CM
BH CV ECHO MEAS - AO MAX PG: 8.3 MMHG
BH CV ECHO MEAS - AO MEAN PG: 5 MMHG
BH CV ECHO MEAS - AO ROOT DIAM: 3.5 CM
BH CV ECHO MEAS - AO V2 MAX: 144 CM/SEC
BH CV ECHO MEAS - AO V2 VTI: 26.4 CM
BH CV ECHO MEAS - AVA(I,D): 2.7 CM2
BH CV ECHO MEAS - EDV(CUBED): 91.1 ML
BH CV ECHO MEAS - EDV(MOD-SP2): 71 ML
BH CV ECHO MEAS - EDV(MOD-SP4): 80 ML
BH CV ECHO MEAS - EF(MOD-BP): 63.4 %
BH CV ECHO MEAS - EF(MOD-SP2): 64.8 %
BH CV ECHO MEAS - EF(MOD-SP4): 62.5 %
BH CV ECHO MEAS - EF_3D-VOL: 61 %
BH CV ECHO MEAS - ESV(CUBED): 37.1 ML
BH CV ECHO MEAS - ESV(MOD-SP2): 25 ML
BH CV ECHO MEAS - ESV(MOD-SP4): 30 ML
BH CV ECHO MEAS - FS: 25.9 %
BH CV ECHO MEAS - IVS/LVPW: 1 CM
BH CV ECHO MEAS - IVSD: 0.8 CM
BH CV ECHO MEAS - LAT PEAK E' VEL: 15.4 CM/SEC
BH CV ECHO MEAS - LV MASS(C)D: 113.6 GRAMS
BH CV ECHO MEAS - LV MAX PG: 5.9 MMHG
BH CV ECHO MEAS - LV MEAN PG: 3 MMHG
BH CV ECHO MEAS - LV V1 MAX: 121 CM/SEC
BH CV ECHO MEAS - LV V1 VTI: 22.6 CM
BH CV ECHO MEAS - LVIDD: 4.5 CM
BH CV ECHO MEAS - LVIDS: 3.3 CM
BH CV ECHO MEAS - LVOT AREA: 3.1 CM2
BH CV ECHO MEAS - LVOT DIAM: 1.99 CM
BH CV ECHO MEAS - LVPWD: 0.8 CM
BH CV ECHO MEAS - MED PEAK E' VEL: 12.2 CM/SEC
BH CV ECHO MEAS - MV A DUR: 0.12 SEC
BH CV ECHO MEAS - MV A MAX VEL: 87.9 CM/SEC
BH CV ECHO MEAS - MV DEC SLOPE: 438.8 CM/SEC2
BH CV ECHO MEAS - MV DEC TIME: 0.18 MSEC
BH CV ECHO MEAS - MV E MAX VEL: 92 CM/SEC
BH CV ECHO MEAS - MV E/A: 1.05
BH CV ECHO MEAS - MV MAX PG: 3.9 MMHG
BH CV ECHO MEAS - MV MEAN PG: 2.4 MMHG
BH CV ECHO MEAS - MV P1/2T: 70.7 MSEC
BH CV ECHO MEAS - MV V2 VTI: 20.6 CM
BH CV ECHO MEAS - MVA(P1/2T): 3.1 CM2
BH CV ECHO MEAS - MVA(VTI): 3.4 CM2
BH CV ECHO MEAS - PA ACC TIME: 0.12 SEC
BH CV ECHO MEAS - PA PR(ACCEL): 24.3 MMHG
BH CV ECHO MEAS - PA V2 MAX: 100.4 CM/SEC
BH CV ECHO MEAS - PULM A REVS DUR: 0.12 SEC
BH CV ECHO MEAS - PULM A REVS VEL: 57.6 CM/SEC
BH CV ECHO MEAS - PULM DIAS VEL: 55 CM/SEC
BH CV ECHO MEAS - PULM S/D: 1.38
BH CV ECHO MEAS - PULM SYS VEL: 76.1 CM/SEC
BH CV ECHO MEAS - QP/QS: 0.54
BH CV ECHO MEAS - RAP SYSTOLE: 8 MMHG
BH CV ECHO MEAS - RV MAX PG: 2.38 MMHG
BH CV ECHO MEAS - RV V1 MAX: 77.1 CM/SEC
BH CV ECHO MEAS - RV V1 VTI: 19.6 CM
BH CV ECHO MEAS - RVOT DIAM: 1.57 CM
BH CV ECHO MEAS - RVSP: 34.1 MMHG
BH CV ECHO MEAS - SUP REN AO DIAM: 2.2 CM
BH CV ECHO MEAS - SV(LVOT): 70.1 ML
BH CV ECHO MEAS - SV(MOD-SP2): 46 ML
BH CV ECHO MEAS - SV(MOD-SP4): 50 ML
BH CV ECHO MEAS - SV(RVOT): 38.2 ML
BH CV ECHO MEAS - TAPSE (>1.6): 2.8 CM
BH CV ECHO MEAS - TR MAX PG: 26.1 MMHG
BH CV ECHO MEAS - TR MAX VEL: 255.6 CM/SEC
BH CV ECHO MEASUREMENTS AVERAGE E/E' RATIO: 6.67
BH CV XLRA - RV BASE: 2.9 CM
BH CV XLRA - RV LENGTH: 6.9 CM
BH CV XLRA - RV MID: 2.11 CM
BH CV XLRA - TDI S': 18.4 CM/SEC
BUN SERPL-MCNC: 17 MG/DL (ref 8–23)
BUN/CREAT SERPL: 25.8 (ref 7–25)
CALCIUM SPEC-SCNC: 9.7 MG/DL (ref 8.6–10.5)
CHLORIDE SERPL-SCNC: 102 MMOL/L (ref 98–107)
CO2 SERPL-SCNC: 26 MMOL/L (ref 22–29)
CREAT SERPL-MCNC: 0.66 MG/DL (ref 0.57–1)
DEPRECATED RDW RBC AUTO: 50.1 FL (ref 37–54)
EGFRCR SERPLBLD CKD-EPI 2021: 100.6 ML/MIN/1.73
EOSINOPHIL # BLD AUTO: 0 10*3/MM3 (ref 0–0.4)
EOSINOPHIL NFR BLD AUTO: 0 % (ref 0.3–6.2)
ERYTHROCYTE [DISTWIDTH] IN BLOOD BY AUTOMATED COUNT: 14.3 % (ref 12.3–15.4)
GLUCOSE SERPL-MCNC: 130 MG/DL (ref 65–99)
HCT VFR BLD AUTO: 36.9 % (ref 34–46.6)
HGB BLD-MCNC: 12.2 G/DL (ref 12–15.9)
IMM GRANULOCYTES # BLD AUTO: 0 10*3/MM3 (ref 0–0.05)
IMM GRANULOCYTES NFR BLD AUTO: 0 % (ref 0–0.5)
LEFT ATRIUM VOLUME INDEX: 16.5 ML/M2
LYMPHOCYTES # BLD AUTO: 0.57 10*3/MM3 (ref 0.7–3.1)
LYMPHOCYTES NFR BLD AUTO: 12.6 % (ref 19.6–45.3)
MAXIMAL PREDICTED HEART RATE: 160 BPM
MCH RBC QN AUTO: 31 PG (ref 26.6–33)
MCHC RBC AUTO-ENTMCNC: 33.1 G/DL (ref 31.5–35.7)
MCV RBC AUTO: 93.9 FL (ref 79–97)
MONOCYTES # BLD AUTO: 0.17 10*3/MM3 (ref 0.1–0.9)
MONOCYTES NFR BLD AUTO: 3.8 % (ref 5–12)
NEUTROPHILS NFR BLD AUTO: 3.79 10*3/MM3 (ref 1.7–7)
NEUTROPHILS NFR BLD AUTO: 83.6 % (ref 42.7–76)
NRBC BLD AUTO-RTO: 0 /100 WBC (ref 0–0.2)
PLATELET # BLD AUTO: 290 10*3/MM3 (ref 140–450)
PMV BLD AUTO: 9.5 FL (ref 6–12)
POTASSIUM SERPL-SCNC: 4.9 MMOL/L (ref 3.5–5.2)
RBC # BLD AUTO: 3.93 10*6/MM3 (ref 3.77–5.28)
SINUS: 3 CM
SODIUM SERPL-SCNC: 139 MMOL/L (ref 136–145)
STJ: 2.8 CM
STRESS TARGET HR: 136 BPM
WBC NRBC COR # BLD: 4.53 10*3/MM3 (ref 3.4–10.8)

## 2022-10-02 PROCEDURE — 94799 UNLISTED PULMONARY SVC/PX: CPT

## 2022-10-02 PROCEDURE — 94761 N-INVAS EAR/PLS OXIMETRY MLT: CPT

## 2022-10-02 PROCEDURE — G0378 HOSPITAL OBSERVATION PER HR: HCPCS

## 2022-10-02 PROCEDURE — 63710000001 PREDNISONE PER 1 MG: Performed by: INTERNAL MEDICINE

## 2022-10-02 PROCEDURE — 85025 COMPLETE CBC W/AUTO DIFF WBC: CPT | Performed by: INTERNAL MEDICINE

## 2022-10-02 PROCEDURE — 94664 DEMO&/EVAL PT USE INHALER: CPT

## 2022-10-02 PROCEDURE — 25010000002 ENOXAPARIN PER 10 MG: Performed by: INTERNAL MEDICINE

## 2022-10-02 PROCEDURE — 96375 TX/PRO/DX INJ NEW DRUG ADDON: CPT

## 2022-10-02 PROCEDURE — 80048 BASIC METABOLIC PNL TOTAL CA: CPT | Performed by: INTERNAL MEDICINE

## 2022-10-02 PROCEDURE — 25010000002 METHYLPREDNISOLONE PER 125 MG: Performed by: INTERNAL MEDICINE

## 2022-10-02 PROCEDURE — 96372 THER/PROPH/DIAG INJ SC/IM: CPT

## 2022-10-02 RX ORDER — GABAPENTIN 300 MG/1
300 CAPSULE ORAL EVERY 8 HOURS SCHEDULED
Status: DISCONTINUED | OUTPATIENT
Start: 2022-10-02 | End: 2022-10-04 | Stop reason: HOSPADM

## 2022-10-02 RX ORDER — METHYLPREDNISOLONE SODIUM SUCCINATE 125 MG/2ML
60 INJECTION, POWDER, LYOPHILIZED, FOR SOLUTION INTRAMUSCULAR; INTRAVENOUS EVERY 8 HOURS
Status: DISCONTINUED | OUTPATIENT
Start: 2022-10-02 | End: 2022-10-03

## 2022-10-02 RX ADMIN — HYDROCODONE BITARTRATE AND ACETAMINOPHEN 1 TABLET: 10; 325 TABLET ORAL at 08:18

## 2022-10-02 RX ADMIN — GABAPENTIN 300 MG: 300 CAPSULE ORAL at 21:07

## 2022-10-02 RX ADMIN — CITALOPRAM 20 MG: 20 TABLET, FILM COATED ORAL at 17:00

## 2022-10-02 RX ADMIN — Medication 10 ML: at 08:19

## 2022-10-02 RX ADMIN — IPRATROPIUM BROMIDE AND ALBUTEROL SULFATE 3 ML: .5; 3 SOLUTION RESPIRATORY (INHALATION) at 08:01

## 2022-10-02 RX ADMIN — COLCHICINE 0.6 MG: 0.6 TABLET, FILM COATED ORAL at 08:18

## 2022-10-02 RX ADMIN — COLCHICINE 0.6 MG: 0.6 TABLET, FILM COATED ORAL at 20:56

## 2022-10-02 RX ADMIN — METHYLPREDNISOLONE SODIUM SUCCINATE 60 MG: 125 INJECTION, POWDER, FOR SOLUTION INTRAMUSCULAR; INTRAVENOUS at 17:00

## 2022-10-02 RX ADMIN — ALPRAZOLAM 1 MG: 1 TABLET ORAL at 17:00

## 2022-10-02 RX ADMIN — HYDROCODONE BITARTRATE AND ACETAMINOPHEN 1 TABLET: 10; 325 TABLET ORAL at 21:07

## 2022-10-02 RX ADMIN — Medication 10 ML: at 20:57

## 2022-10-02 RX ADMIN — ENOXAPARIN SODIUM 40 MG: 100 INJECTION SUBCUTANEOUS at 06:39

## 2022-10-02 RX ADMIN — HYDROCODONE BITARTRATE AND ACETAMINOPHEN 1 TABLET: 10; 325 TABLET ORAL at 13:40

## 2022-10-02 RX ADMIN — PREDNISONE 40 MG: 20 TABLET ORAL at 08:18

## 2022-10-02 RX ADMIN — IPRATROPIUM BROMIDE AND ALBUTEROL SULFATE 3 ML: .5; 3 SOLUTION RESPIRATORY (INHALATION) at 11:23

## 2022-10-02 NOTE — PLAN OF CARE
Goal Outcome Evaluation:         Patient reports some improvement with pain. Scheduled colchicine given, no other complaints of pain. Ambulates independently. VSS. All needs met.

## 2022-10-02 NOTE — PLAN OF CARE
Goal Outcome Evaluation:  Plan of Care Reviewed With: patient        Progress: no change  Outcome Evaluation: Pt continues to have left sided chest pain with radiation to neck and shoulder. Medicated as needed. Up ad-marycarmen. Appetite fair. VSS. Changed steroids to IV and starting gabapentin. WCTM.

## 2022-10-02 NOTE — PROGRESS NOTES
Name: Meme Mcginnis ADMIT: 2022   : 1960  PCP: Radu Rawls MD    MRN: [id number] LOS: 0 days   AGE/SEX: 62 y.o. female  ROOM: [facility]     Subjective   Subjective      Patient is lying on the bed and is complaining of left-sided chest pain radiating across her left breast as well as left scapula.    Objective   Objective   Vital Signs  Temp:  [97.8 °F (36.6 °C)-98.2 °F (36.8 °C)] 98.2 °F (36.8 °C)  Heart Rate:  [] 111  Resp:  [16-18] 16  BP: (101-114)/(59-67) 106/67  SpO2:  [94 %-99 %] 96 %  on   ;   Device (Oxygen Therapy): room air  Body mass index is 19.64 kg/m².  Physical Exam  Constitutional:       General: She is not in acute distress.     Appearance: Normal appearance.   HENT:      Head: Normocephalic and atraumatic.      Mouth/Throat:      Mouth: Mucous membranes are dry.   Eyes:      Extraocular Movements: Extraocular movements intact.      Conjunctiva/sclera: Conjunctivae normal.      Pupils: Pupils are equal, round, and reactive to light.   Cardiovascular:      Rate and Rhythm: Normal rate and regular rhythm.      Pulses: Normal pulses.      Heart sounds: Normal heart sounds.   Pulmonary:      Effort: Pulmonary effort is normal.      Breath sounds: Normal breath sounds.   Abdominal:      General: Bowel sounds are normal. There is no distension.      Palpations: Abdomen is soft.   Musculoskeletal:      Cervical back: Normal range of motion and neck supple.      Right lower leg: No edema.      Left lower leg: No edema.   Skin:     General: Skin is warm and dry.   Neurological:      General: No focal deficit present.      Mental Status: She is alert and oriented to person, place, and time.   Psychiatric:         Mood and Affect: Mood normal.         Behavior: Behavior normal.       Results Review     I reviewed the patient's new clinical results.  Results from last 7 days   Lab Units 10/02/22  0649 10/01/22  0836 22  1424   WBC 10*3/mm3 4.53 4.39 5.45   HEMOGLOBIN g/dL  12.2 12.0 12.8   PLATELETS 10*3/mm3 290 281 324     Results from last 7 days   Lab Units 10/02/22  0649 10/01/22  0836 09/30/22  1424   SODIUM mmol/L 139 136 137   POTASSIUM mmol/L 4.9 4.6 4.0   CHLORIDE mmol/L 102 96* 99   CO2 mmol/L 26.0 29.7* 28.1   BUN mg/dL 17 15 13   CREATININE mg/dL 0.66 0.80 0.80   GLUCOSE mg/dL 130* 111* 98   EGFR mL/min/1.73 100.6 84.5 84.5     Results from last 7 days   Lab Units 10/01/22  0836 09/30/22  1424   ALBUMIN g/dL 3.90 4.70   BILIRUBIN mg/dL 0.2 <0.2   ALK PHOS U/L 53 57   AST (SGOT) U/L 23 25   ALT (SGPT) U/L 15 16     Results from last 7 days   Lab Units 10/02/22  0649 10/01/22  0836 09/30/22  1424   CALCIUM mg/dL 9.7 8.8 9.6   ALBUMIN g/dL  --  3.90 4.70       No results found for: HGBA1C, POCGLU    No radiology results for the last day  Scheduled Medications  ALPRAZolam, 1 mg, Oral, Q PM  citalopram, 20 mg, Oral, Q PM  colchicine, 0.6 mg, Oral, Q12H  enoxaparin, 40 mg, Subcutaneous, Q24H  [START ON 10/3/2022] fentaNYL, 1 patch, Transdermal, Q72H  ipratropium-albuterol, 3 mL, Nebulization, 4x Daily - RT  predniSONE, 40 mg, Oral, Daily  sodium chloride, 10 mL, Intravenous, Q12H    Infusions   Diet  Diet Regular       Assessment/Plan     Active Hospital Problems    Diagnosis  POA   • **Pleuritic chest pain [R07.81]  Yes   • DDD (degenerative disc disease), cervical [M50.30]  Yes   • DDD (degenerative disc disease), lumbar [M51.36]  Unknown   • Depression [F32.A]  Unknown   • Pleurisy [R09.1]  Unknown   • Colitis, collagenous [K52.831]  Yes      Resolved Hospital Problems   No resolved problems to display.       62 y.o. female admitted with Pleuritic chest pain.    1.Pleuritic chest pain, CT chest with contrast with no evidence of any pulmonary embolism or pneumonia.  We will continue with colchicine and p.o. steroids will be switched to IV given that she has ongoing pain.  If there is no relief with this treatment plan then may consider adding acyclovir for a nonrash related  shingles.    2.  Anxiety, on alprazolam.    3.  On Lovenox for DVT prophylaxis.    4.  CODE STATUS is full code.      Copied text on this note has been reviewed on 10/2/2022        Gwyn Rae MD  [facility]  10/02/22  16:34 EDT

## 2022-10-03 PROBLEM — G89.29 CHRONIC PAIN: Status: ACTIVE | Noted: 2022-10-03

## 2022-10-03 LAB
ANION GAP SERPL CALCULATED.3IONS-SCNC: 8.3 MMOL/L (ref 5–15)
BASOPHILS # BLD AUTO: 0 10*3/MM3 (ref 0–0.2)
BASOPHILS NFR BLD AUTO: 0 % (ref 0–1.5)
BUN SERPL-MCNC: 17 MG/DL (ref 8–23)
BUN/CREAT SERPL: 26.2 (ref 7–25)
CALCIUM SPEC-SCNC: 9.4 MG/DL (ref 8.6–10.5)
CHLORIDE SERPL-SCNC: 99 MMOL/L (ref 98–107)
CO2 SERPL-SCNC: 27.7 MMOL/L (ref 22–29)
CREAT SERPL-MCNC: 0.65 MG/DL (ref 0.57–1)
DEPRECATED RDW RBC AUTO: 50.6 FL (ref 37–54)
EGFRCR SERPLBLD CKD-EPI 2021: 100.9 ML/MIN/1.73
EOSINOPHIL # BLD AUTO: 0 10*3/MM3 (ref 0–0.4)
EOSINOPHIL NFR BLD AUTO: 0 % (ref 0.3–6.2)
ERYTHROCYTE [DISTWIDTH] IN BLOOD BY AUTOMATED COUNT: 14.5 % (ref 12.3–15.4)
ERYTHROCYTE [SEDIMENTATION RATE] IN BLOOD: 8 MM/HR (ref 0–30)
GLUCOSE SERPL-MCNC: 163 MG/DL (ref 65–99)
HCT VFR BLD AUTO: 37.1 % (ref 34–46.6)
HGB BLD-MCNC: 12 G/DL (ref 12–15.9)
IMM GRANULOCYTES # BLD AUTO: 0.09 10*3/MM3 (ref 0–0.05)
IMM GRANULOCYTES NFR BLD AUTO: 0.8 % (ref 0–0.5)
LYMPHOCYTES # BLD AUTO: 0.64 10*3/MM3 (ref 0.7–3.1)
LYMPHOCYTES NFR BLD AUTO: 6 % (ref 19.6–45.3)
MCH RBC QN AUTO: 30.8 PG (ref 26.6–33)
MCHC RBC AUTO-ENTMCNC: 32.3 G/DL (ref 31.5–35.7)
MCV RBC AUTO: 95.4 FL (ref 79–97)
MONOCYTES # BLD AUTO: 0.19 10*3/MM3 (ref 0.1–0.9)
MONOCYTES NFR BLD AUTO: 1.8 % (ref 5–12)
NEUTROPHILS NFR BLD AUTO: 9.79 10*3/MM3 (ref 1.7–7)
NEUTROPHILS NFR BLD AUTO: 91.4 % (ref 42.7–76)
NRBC BLD AUTO-RTO: 0 /100 WBC (ref 0–0.2)
PLATELET # BLD AUTO: 270 10*3/MM3 (ref 140–450)
PMV BLD AUTO: 9.5 FL (ref 6–12)
POTASSIUM SERPL-SCNC: 4.6 MMOL/L (ref 3.5–5.2)
RBC # BLD AUTO: 3.89 10*6/MM3 (ref 3.77–5.28)
SODIUM SERPL-SCNC: 135 MMOL/L (ref 136–145)
WBC NRBC COR # BLD: 10.71 10*3/MM3 (ref 3.4–10.8)

## 2022-10-03 PROCEDURE — 85652 RBC SED RATE AUTOMATED: CPT | Performed by: HOSPITALIST

## 2022-10-03 PROCEDURE — 96372 THER/PROPH/DIAG INJ SC/IM: CPT

## 2022-10-03 PROCEDURE — 80048 BASIC METABOLIC PNL TOTAL CA: CPT | Performed by: INTERNAL MEDICINE

## 2022-10-03 PROCEDURE — 85025 COMPLETE CBC W/AUTO DIFF WBC: CPT | Performed by: INTERNAL MEDICINE

## 2022-10-03 PROCEDURE — G0378 HOSPITAL OBSERVATION PER HR: HCPCS

## 2022-10-03 PROCEDURE — 25010000002 ENOXAPARIN PER 10 MG: Performed by: INTERNAL MEDICINE

## 2022-10-03 PROCEDURE — 25010000002 METHYLPREDNISOLONE PER 125 MG: Performed by: INTERNAL MEDICINE

## 2022-10-03 PROCEDURE — 96376 TX/PRO/DX INJ SAME DRUG ADON: CPT

## 2022-10-03 RX ORDER — PREDNISONE 20 MG/1
40 TABLET ORAL
Status: DISCONTINUED | OUTPATIENT
Start: 2022-10-04 | End: 2022-10-04 | Stop reason: HOSPADM

## 2022-10-03 RX ORDER — PANTOPRAZOLE SODIUM 40 MG/1
40 TABLET, DELAYED RELEASE ORAL
Status: DISCONTINUED | OUTPATIENT
Start: 2022-10-04 | End: 2022-10-04 | Stop reason: HOSPADM

## 2022-10-03 RX ADMIN — METHYLPREDNISOLONE SODIUM SUCCINATE 60 MG: 125 INJECTION, POWDER, FOR SOLUTION INTRAMUSCULAR; INTRAVENOUS at 09:05

## 2022-10-03 RX ADMIN — COLCHICINE 0.6 MG: 0.6 TABLET, FILM COATED ORAL at 09:05

## 2022-10-03 RX ADMIN — FENTANYL 1 PATCH: 50 PATCH TRANSDERMAL at 09:05

## 2022-10-03 RX ADMIN — CITALOPRAM 20 MG: 20 TABLET, FILM COATED ORAL at 17:30

## 2022-10-03 RX ADMIN — GABAPENTIN 300 MG: 300 CAPSULE ORAL at 14:26

## 2022-10-03 RX ADMIN — GABAPENTIN 300 MG: 300 CAPSULE ORAL at 21:42

## 2022-10-03 RX ADMIN — TIZANIDINE 2 MG: 4 TABLET ORAL at 17:36

## 2022-10-03 RX ADMIN — ENOXAPARIN SODIUM 40 MG: 100 INJECTION SUBCUTANEOUS at 06:10

## 2022-10-03 RX ADMIN — ALPRAZOLAM 1 MG: 1 TABLET ORAL at 17:30

## 2022-10-03 RX ADMIN — METHYLPREDNISOLONE SODIUM SUCCINATE 60 MG: 125 INJECTION, POWDER, FOR SOLUTION INTRAMUSCULAR; INTRAVENOUS at 02:01

## 2022-10-03 RX ADMIN — Medication 10 ML: at 21:42

## 2022-10-03 RX ADMIN — GABAPENTIN 300 MG: 300 CAPSULE ORAL at 06:10

## 2022-10-03 RX ADMIN — Medication 10 ML: at 09:06

## 2022-10-03 RX ADMIN — METHYLPREDNISOLONE SODIUM SUCCINATE 60 MG: 125 INJECTION, POWDER, FOR SOLUTION INTRAMUSCULAR; INTRAVENOUS at 17:30

## 2022-10-03 NOTE — PROGRESS NOTES
Name: Meme Mcginnis ADMIT: 2022   : 1960  PCP: Radu Rawls MD    MRN: [id number] LOS: 0 days   AGE/SEX: 62 y.o. female  ROOM: [facility]     Subjective   Subjective   Pleuritic pain improved. She states is left-sided and sharp worse with movements and certain positions. At its worst it was an 8 out of 10 now it is down to a 4 out of 10. She states she was referred to a rheumatologist for rheumatoid arthritis evaluation    Review of Systems   Constitutional: Negative for fever.   Respiratory: Negative for cough and shortness of breath.    Gastrointestinal: Negative for abdominal pain, diarrhea, nausea and vomiting.   Genitourinary: Negative for dysuria.   Neurological: Negative for headaches.      Objective   Objective   Vital Signs  Temp:  [97.9 °F (36.6 °C)-98.4 °F (36.9 °C)] 98.4 °F (36.9 °C)  Heart Rate:  [] 90  Resp:  [16] 16  BP: (101-117)/(65-76) 117/76  SpO2:  [94 %-96 %] 94 %  on   ;   Device (Oxygen Therapy): room air  Body mass index is 19.64 kg/m².    Physical Exam  Constitutional:       General: She is not in acute distress.     Appearance: Normal appearance. She is not toxic-appearing.   HENT:      Head: Normocephalic and atraumatic.   Cardiovascular:      Rate and Rhythm: Normal rate and regular rhythm.   Pulmonary:      Effort: Pulmonary effort is normal. No respiratory distress.      Breath sounds: Normal breath sounds.   Abdominal:      General: Bowel sounds are normal.      Palpations: Abdomen is soft.      Tenderness: There is no abdominal tenderness.   Musculoskeletal:         General: No swelling.   Skin:     General: Skin is warm and dry.   Neurological:      General: No focal deficit present.      Mental Status: She is alert.   Psychiatric:         Mood and Affect: Mood normal.         Behavior: Behavior normal.         Thought Content: Thought content normal.     Results Review  I reviewed the patient's new clinical results.  Results from last 7 days   Lab  Units 10/03/22  0706 10/02/22  0649 10/01/22  0836 09/30/22  1424   WBC 10*3/mm3 10.71 4.53 4.39 5.45   HEMOGLOBIN g/dL 12.0 12.2 12.0 12.8   PLATELETS 10*3/mm3 270 290 281 324     Results from last 7 days   Lab Units 10/03/22  0706 10/02/22  0649 10/01/22  0836 09/30/22  1424   SODIUM mmol/L 135* 139 136 137   POTASSIUM mmol/L 4.6 4.9 4.6 4.0   CHLORIDE mmol/L 99 102 96* 99   CO2 mmol/L 27.7 26.0 29.7* 28.1   BUN mg/dL 17 17 15 13   CREATININE mg/dL 0.65 0.66 0.80 0.80   GLUCOSE mg/dL 163* 130* 111* 98     Lab Results   Component Value Date    ANIONGAP 8.3 10/03/2022     Estimated Creatinine Clearance: 77.7 mL/min (by C-G formula based on SCr of 0.65 mg/dL).    Results from last 7 days   Lab Units 10/01/22  0836 09/30/22  1424   ALBUMIN g/dL 3.90 4.70   BILIRUBIN mg/dL 0.2 <0.2   ALK PHOS U/L 53 57   AST (SGOT) U/L 23 25   ALT (SGPT) U/L 15 16     Results from last 7 days   Lab Units 10/03/22  0706 10/02/22  0649 10/01/22  0836 09/30/22  1424   CALCIUM mg/dL 9.4 9.7 8.8 9.6   ALBUMIN g/dL  --   --  3.90 4.70       No results found for: HGBA1C, POCGLU    No radiology results for the last day    Scheduled Meds  ALPRAZolam, 1 mg, Oral, Q PM  citalopram, 20 mg, Oral, Q PM  colchicine, 0.6 mg, Oral, Q12H  enoxaparin, 40 mg, Subcutaneous, Q24H  fentaNYL, 1 patch, Transdermal, Q72H  gabapentin, 300 mg, Oral, Q8H  ipratropium-albuterol, 3 mL, Nebulization, 4x Daily - RT  methylPREDNISolone sodium succinate, 60 mg, Intravenous, Q8H  sodium chloride, 10 mL, Intravenous, Q12H    Continuous Infusions   PRN Meds  HYDROcodone-acetaminophen  •  nitroglycerin  •  ondansetron  •  sodium chloride  •  tiZANidine     Diet  Diet Regular    I have personally reviewed:  [x]  Laboratory   [x]  Microbiology   [x]  Radiology   [x]  EKG/Telemetry SR on telemetry  []  Cardiology/Vascular   []  Pathology   []  Records    Results for orders placed during the hospital encounter of 09/30/22    Adult Transthoracic Echo Complete w/ Color, Spectral  and Contrast if Necessary Per Protocol    Interpretation Summary  · Calculated left ventricular EF = 63.4% Calculated left ventricular 3D EF = 61% Estimated left ventricular EF was in agreement with the calculated left ventricular EF. Left ventricular systolic function is normal.  · Left ventricular diastolic function is consistent with (grade I) impaired relaxation.  · Mild tricuspid valve regurgitation is present.  · Estimated right ventricular systolic pressure from tricuspid regurgitation is normal (<35 mmHg).        Assessment/Plan     Active Hospital Problems    Diagnosis  POA   • **Pleuritic chest pain [R07.81]  Yes   • Chronic pain [G89.29]  Unknown   • Depression [F32.A]  Unknown   • Colitis, collagenous [K52.831]  Yes      Resolved Hospital Problems   No resolved problems to display.     62 y.o. female admitted with pleuritic chest pain    Pleuritic chest pain  -No convincing PE on CT chest (dimer up but not specific. Lower extremity Dopplers normal)  -Troponins negative. Echocardiogram above  -Pain improved after IV steroids, colchicine  -Check ESR (use existing specimen)  -Switch to p.o. steroids and see how she does  -Rheumatology referral as an outpatient (PCP referred her to rheumatologist in the past)    Chronic pain    Hyperglycemia  -Steroids contributing    SCDs for DVT prophylaxis    Discussed with patient, nursing staff, CCP and care team on multidisciplinary rounds    Discharge: Maybe tomorrow    Anastacio Gamboa MD  [facility]  10/03/22

## 2022-10-03 NOTE — CASE MANAGEMENT/SOCIAL WORK
Discharge Planning Assessment  [facility]     Patient Name: Meme Mcginnis  MRN: [id number]  Today's Date: 10/3/2022    Admit Date: 9/30/2022    Plan: plans to return home with daughter- CCP will follow for needs   Discharge Needs Assessment     Row Name 10/03/22 1410       Living Environment    People in Home child(gillian), adult    Name(s) of People in Home Elli boles [phone]    Current Living Arrangements home    Primary Care Provided by self    Provides Primary Care For no one    Family Caregiver if Needed child(gillian), adult    Family Caregiver Names Elli boles [phone]    Quality of Family Relationships helpful;involved;supportive    Able to Return to Prior Arrangements yes       Resource/Environmental Concerns    Resource/Environmental Concerns none       Transition Planning    Patient/Family Anticipates Transition to home with family    Patient/Family Anticipated Services at Transition none    Transportation Anticipated car, drives self;family or friend will provide       Discharge Needs Assessment    Readmission Within the Last 30 Days no previous admission in last 30 days    Equipment Currently Used at Home none    Anticipated Changes Related to Illness none               Discharge Plan     Row Name 10/03/22 1411       Plan    Plan plans to return home with daughter- CCP will follow for needs    Patient/Family in Agreement with Plan yes    Plan Comments Spoke with patient at bedside. Introduced self and explained role.  Facesheet verified.  Patient lives with her daughter, Elli Mcginnis ( [phone]), in a single story house with a basement.  She is IADLS and drives.  She does not use any DME and does not have a HH or SNF history.  At [place], she plans to return home and does not anticiapte any needs.  CCP will follow. Radha Cox RN              Continued Care and Services - Admitted Since 9/30/2022    Coordination has not been started for this encounter.       Expected  Discharge Date and Time     Expected Discharge Date Expected Discharge Time    Oct 3, 2022          Demographic Summary     Row Name 10/03/22 1408       General Information    Admission Type observation    Arrived From emergency department    Required Notices Provided Observation Status Notice    Referral Source admission list    Reason for Consult discharge planning    Preferred Language English               Functional Status     Row Name 10/03/22 1409       Functional Status    Usual Activity Tolerance good    Current Activity Tolerance good       Functional Status, IADL    Medications independent    Meal Preparation independent    Housekeeping independent    Laundry independent    Shopping independent       Mental Status    General Appearance WDL WDL       Mental Status Summary    Recent Changes in Mental Status/Cognitive Functioning no changes               Psychosocial    No documentation.                Abuse/Neglect    No documentation.                Legal    No documentation.                Substance Abuse    No documentation.                Patient Forms    No documentation.                   Radha Cox, RN

## 2022-10-03 NOTE — PLAN OF CARE
Goal Outcome Evaluation:  Plan of Care Reviewed With: patient        Progress: improving  Outcome Evaluation: Pt reports overall improvment w/ pain. Fentanyl patch replaced and on L gluteal. D/c colchicine, switch to PO steroids tomorrow. Up adlib. VSS. Possible d/c tomorrow. Will continue to monitor.

## 2022-10-04 VITALS
SYSTOLIC BLOOD PRESSURE: 109 MMHG | DIASTOLIC BLOOD PRESSURE: 66 MMHG | TEMPERATURE: 97.7 F | HEIGHT: 65 IN | WEIGHT: 118 LBS | OXYGEN SATURATION: 94 % | BODY MASS INDEX: 19.66 KG/M2 | HEART RATE: 78 BPM | RESPIRATION RATE: 18 BRPM

## 2022-10-04 LAB
ANION GAP SERPL CALCULATED.3IONS-SCNC: 8.7 MMOL/L (ref 5–15)
BASOPHILS # BLD AUTO: 0 10*3/MM3 (ref 0–0.2)
BASOPHILS NFR BLD AUTO: 0 % (ref 0–1.5)
BUN SERPL-MCNC: 21 MG/DL (ref 8–23)
BUN/CREAT SERPL: 33.9 (ref 7–25)
CALCIUM SPEC-SCNC: 9.2 MG/DL (ref 8.6–10.5)
CHLORIDE SERPL-SCNC: 97 MMOL/L (ref 98–107)
CO2 SERPL-SCNC: 30.3 MMOL/L (ref 22–29)
CREAT SERPL-MCNC: 0.62 MG/DL (ref 0.57–1)
DEPRECATED RDW RBC AUTO: 50.4 FL (ref 37–54)
EGFRCR SERPLBLD CKD-EPI 2021: 102.1 ML/MIN/1.73
EOSINOPHIL # BLD AUTO: 0 10*3/MM3 (ref 0–0.4)
EOSINOPHIL NFR BLD AUTO: 0 % (ref 0.3–6.2)
ERYTHROCYTE [DISTWIDTH] IN BLOOD BY AUTOMATED COUNT: 14.5 % (ref 12.3–15.4)
GLUCOSE SERPL-MCNC: 106 MG/DL (ref 65–99)
HCT VFR BLD AUTO: 35.7 % (ref 34–46.6)
HGB BLD-MCNC: 11.9 G/DL (ref 12–15.9)
IMM GRANULOCYTES # BLD AUTO: 0.07 10*3/MM3 (ref 0–0.05)
IMM GRANULOCYTES NFR BLD AUTO: 0.8 % (ref 0–0.5)
LYMPHOCYTES # BLD AUTO: 1.39 10*3/MM3 (ref 0.7–3.1)
LYMPHOCYTES NFR BLD AUTO: 15 % (ref 19.6–45.3)
MCH RBC QN AUTO: 31.4 PG (ref 26.6–33)
MCHC RBC AUTO-ENTMCNC: 33.3 G/DL (ref 31.5–35.7)
MCV RBC AUTO: 94.2 FL (ref 79–97)
MONOCYTES # BLD AUTO: 0.51 10*3/MM3 (ref 0.1–0.9)
MONOCYTES NFR BLD AUTO: 5.5 % (ref 5–12)
NEUTROPHILS NFR BLD AUTO: 7.32 10*3/MM3 (ref 1.7–7)
NEUTROPHILS NFR BLD AUTO: 78.7 % (ref 42.7–76)
NRBC BLD AUTO-RTO: 0 /100 WBC (ref 0–0.2)
PLATELET # BLD AUTO: 261 10*3/MM3 (ref 140–450)
PMV BLD AUTO: 9.3 FL (ref 6–12)
POTASSIUM SERPL-SCNC: 4.3 MMOL/L (ref 3.5–5.2)
RBC # BLD AUTO: 3.79 10*6/MM3 (ref 3.77–5.28)
SODIUM SERPL-SCNC: 136 MMOL/L (ref 136–145)
WBC NRBC COR # BLD: 9.29 10*3/MM3 (ref 3.4–10.8)

## 2022-10-04 PROCEDURE — 25010000002 ENOXAPARIN PER 10 MG: Performed by: INTERNAL MEDICINE

## 2022-10-04 PROCEDURE — 80048 BASIC METABOLIC PNL TOTAL CA: CPT | Performed by: INTERNAL MEDICINE

## 2022-10-04 PROCEDURE — 63710000001 PREDNISONE PER 1 MG: Performed by: HOSPITALIST

## 2022-10-04 PROCEDURE — 85025 COMPLETE CBC W/AUTO DIFF WBC: CPT | Performed by: INTERNAL MEDICINE

## 2022-10-04 PROCEDURE — G0378 HOSPITAL OBSERVATION PER HR: HCPCS

## 2022-10-04 PROCEDURE — 96372 THER/PROPH/DIAG INJ SC/IM: CPT

## 2022-10-04 RX ORDER — METHYLPREDNISOLONE 4 MG/1
TABLET ORAL
Qty: 21 TABLET | Refills: 0 | Status: SHIPPED | OUTPATIENT
Start: 2022-10-04

## 2022-10-04 RX ORDER — PANTOPRAZOLE SODIUM 40 MG/1
40 TABLET, DELAYED RELEASE ORAL
Qty: 7 TABLET | Refills: 0 | Status: SHIPPED | OUTPATIENT
Start: 2022-10-05 | End: 2022-10-12

## 2022-10-04 RX ADMIN — PANTOPRAZOLE SODIUM 40 MG: 40 TABLET, DELAYED RELEASE ORAL at 05:51

## 2022-10-04 RX ADMIN — Medication 10 ML: at 08:14

## 2022-10-04 RX ADMIN — ENOXAPARIN SODIUM 40 MG: 100 INJECTION SUBCUTANEOUS at 05:51

## 2022-10-04 RX ADMIN — HYDROCODONE BITARTRATE AND ACETAMINOPHEN 1 TABLET: 10; 325 TABLET ORAL at 08:26

## 2022-10-04 RX ADMIN — GABAPENTIN 300 MG: 300 CAPSULE ORAL at 05:51

## 2022-10-04 RX ADMIN — PREDNISONE 40 MG: 20 TABLET ORAL at 08:14

## 2022-10-04 NOTE — PLAN OF CARE
Goal Outcome Evaluation:         Patient ad marycarmen, VSS. She states her pain is controlled. All needs met.

## 2022-10-04 NOTE — PLAN OF CARE
Goal Outcome Evaluation:  Plan of Care Reviewed With: patient        Progress: improving  Outcome Evaluation: VSS, minimal complaints of pain.  PRN norco given.  Pt swtiched to oral steriods this morning.  Pt up ad marycarmen, room air.  Plan is for patient to be discharged home today.

## 2022-10-04 NOTE — DISCHARGE SUMMARY
[facility]    Date of Discharge:  10/4/2022    PCP: Radu Rawls MD    Discharge Diagnosis:   Active Hospital Problems    Diagnosis  POA   • **Pleuritic chest pain [R07.81]  Yes   • Chronic pain [G89.29]  Unknown   • Depression [F32.A]  Unknown   • Colitis, collagenous [K52.831]  Yes      Resolved Hospital Problems   No resolved problems to display.      Consults     Date and Time Order Name Status Description    9/30/2022  3:56 PM [facility] (on-call MD unless specified) Details          Hospital Course  62 y.o. female presented with left-sided discomfort. Pain was worse with movement and certain positions. It was also worse with palpation. Pain was left-sided and sharp.     CTA of the chest did not show any convincing evidence for PE but there were some findings suspected to represent acute bronchiolitis (though in the right lower lobe and patient without cough or shortness of breath). Lower extremity Dopplers were normal. Troponins were negative. EKG showed no changes. Echocardiogram as below. ESR was normal. She was put on some steroids and colchicine with significant improvement in her pain (from an 8 out of 10 down to a 4 out of 10). She has been switched to p.o. steroids and will be sent on a taper.     She states she was diagnosed with rheumatoid arthritis about 9 years ago and saw a rheumatologist but did not tolerate medications so she stopped seeing rheumatology. Recommend that she follow-up with a rheumatologist as an outpatient given her reported diagnosis of rheumatoid arthritis.    Results for orders placed during the hospital encounter of 09/30/22    Adult Transthoracic Echo Complete w/ Color, Spectral and Contrast if Necessary Per Protocol    Interpretation Summary  · Calculated left ventricular EF = 63.4% Calculated left ventricular 3D EF = 61% Estimated left ventricular EF was in agreement with the calculated left ventricular EF. Left  ventricular systolic function is normal.  · Left ventricular diastolic function is consistent with (grade I) impaired relaxation.  · Mild tricuspid valve regurgitation is present.  · Estimated right ventricular systolic pressure from tricuspid regurgitation is normal (<35 mmHg).    I discussed the patient's findings and my recommendations with patient and nursing staff.    Condition on Discharge: Improved.     Temp:  [97.4 °F (36.3 °C)-98.4 °F (36.9 °C)] 97.7 °F (36.5 °C)  Heart Rate:  [69-90] 78  Resp:  [16-18] 18  BP: (109-117)/(66-81) 109/66  Body mass index is 19.64 kg/m².    Physical Exam  Constitutional:       General: She is not in acute distress.     Appearance: Normal appearance. She is not toxic-appearing.   HENT:      Head: Normocephalic and atraumatic.   Cardiovascular:      Rate and Rhythm: Normal rate and regular rhythm.   Pulmonary:      Effort: Pulmonary effort is normal. No respiratory distress.      Breath sounds: Normal breath sounds. No wheezing, rhonchi or rales.   Chest:      Comments: minimal tenderness anterior chest wall with palpation  Abdominal:      General: Bowel sounds are normal.      Palpations: Abdomen is soft.      Tenderness: There is no abdominal tenderness. There is no guarding or rebound.   Musculoskeletal:         General: No swelling.   Skin:     General: Skin is warm and dry.   Neurological:      General: No focal deficit present.      Mental Status: She is alert and oriented to person, place, and time.   Psychiatric:         Mood and Affect: Mood normal.         Behavior: Behavior normal.         Thought Content: Thought content normal.     Disposition: Home or Self Care       Discharge Medications      New Medications      Instructions Start Date   methylPREDNISolone 4 MG dose pack  Commonly known as: MEDROL  Notes to patient: Take as instructed   Take as directed on package instructions.      pantoprazole 40 MG EC tablet  Commonly known as: PROTONIX  Notes to patient: Next  dose due 10/5/22 at 9am   40 mg, Oral, Every Early Morning   Start Date: October 5, 2022        Continue These Medications      Instructions Start Date   ALPRAZolam 1 MG tablet  Commonly known as: XANAX  Notes to patient: Next dose due 10/4/22 at 6pm   1 mg, Oral, Every Evening      citalopram 40 MG tablet  Commonly known as: CeleXA  Notes to patient: Next dose due 10/4/22 at 6pm   40 mg, Oral, Every Evening      diclofenac 50 MG tablet  Commonly known as: CATAFLAM  Notes to patient: Next dose due 10/4/22 at 4pm   50 mg, Oral, 3 Times Daily      fentaNYL 50 MCG/HR patch  Commonly known as: DURAGESIC  Notes to patient: Next dose due 10/6/22 at 9am   1 patch, Transdermal, Every 72 Hours      HYDROcodone-acetaminophen  MG per tablet  Commonly known as: NORCO   1 tablet, Oral, Every 6 Hours PRN      tiZANidine 4 MG tablet  Commonly known as: ZANAFLEX   2 mg, Oral, 3 Times Daily PRN, Pt takes half a tablet.            Diet Instructions     Diet: Regular      Discharge Diet: Regular         Activity Instructions     Activity as Tolerated           Additional Instructions for the Follow-ups that You Need to Schedule     Call MD for problems / concerns.   As directed         Follow-up Information     Radu Rawls MD Follow up in 1 week(s).    Specialty: General Practice  Contact information:  [address] [facility] [address] [phone]             [facility]. Call.    Contact information:  [facility] [address] [address] [phone]                        Anastacio Javi Gamboa MD  [facility]  10/04/22    Discharge time spent greater than 30 minutes.

## 2022-10-04 NOTE — CASE MANAGEMENT/SOCIAL WORK
Case Management Discharge Note      Final Note: Patient dc'd home via private auto. Radha Cox RN         Selected Continued Care - Discharged on 10/4/2022 Admission date: 9/30/2022 - Discharge disposition: Home or Self Care    Destination    No services have been selected for the patient.              Durable Medical Equipment    No services have been selected for the patient.              Dialysis/Infusion    No services have been selected for the patient.              Home Medical Care    No services have been selected for the patient.              Therapy    No services have been selected for the patient.              Community Resources    No services have been selected for the patient.              Community & DME    No services have been selected for the patient.                  Transportation Services  Private: Car    Final Discharge Disposition Code: 01 - home or self-care

## 2023-02-16 ENCOUNTER — APPOINTMENT (OUTPATIENT)
Dept: CT IMAGING | Facility: HOSPITAL | Age: 63
End: 2023-02-16
Payer: MEDICARE

## 2023-02-16 ENCOUNTER — HOSPITAL ENCOUNTER (OUTPATIENT)
Facility: HOSPITAL | Age: 63
Setting detail: OBSERVATION
Discharge: LEFT BEFORE DISCHARGE | End: 2023-02-18
Attending: EMERGENCY MEDICINE | Admitting: INTERNAL MEDICINE
Payer: MEDICARE

## 2023-02-16 DIAGNOSIS — R10.9 ABDOMINAL PAIN, VOMITING, AND DIARRHEA: Primary | ICD-10-CM

## 2023-02-16 DIAGNOSIS — F11.93 OPIATE WITHDRAWAL: ICD-10-CM

## 2023-02-16 DIAGNOSIS — R19.7 ABDOMINAL PAIN, VOMITING, AND DIARRHEA: Primary | ICD-10-CM

## 2023-02-16 DIAGNOSIS — R11.10 ABDOMINAL PAIN, VOMITING, AND DIARRHEA: Primary | ICD-10-CM

## 2023-02-16 LAB
ALBUMIN SERPL-MCNC: 4.9 G/DL (ref 3.5–5.2)
ALBUMIN/GLOB SERPL: 1.8 G/DL
ALP SERPL-CCNC: 67 U/L (ref 39–117)
ALT SERPL W P-5'-P-CCNC: 12 U/L (ref 1–33)
ANION GAP SERPL CALCULATED.3IONS-SCNC: 20.3 MMOL/L (ref 5–15)
AST SERPL-CCNC: 25 U/L (ref 1–32)
BASOPHILS # BLD AUTO: 0.03 10*3/MM3 (ref 0–0.2)
BASOPHILS NFR BLD AUTO: 0.4 % (ref 0–1.5)
BILIRUB SERPL-MCNC: 0.3 MG/DL (ref 0–1.2)
BUN SERPL-MCNC: 14 MG/DL (ref 8–23)
BUN/CREAT SERPL: 12.5 (ref 7–25)
CALCIUM SPEC-SCNC: 9.9 MG/DL (ref 8.6–10.5)
CHLORIDE SERPL-SCNC: 97 MMOL/L (ref 98–107)
CO2 SERPL-SCNC: 22.7 MMOL/L (ref 22–29)
CREAT SERPL-MCNC: 1.12 MG/DL (ref 0.57–1)
DEPRECATED RDW RBC AUTO: 40.1 FL (ref 37–54)
EGFRCR SERPLBLD CKD-EPI 2021: 56.4 ML/MIN/1.73
EOSINOPHIL # BLD AUTO: 0.02 10*3/MM3 (ref 0–0.4)
EOSINOPHIL NFR BLD AUTO: 0.3 % (ref 0.3–6.2)
ERYTHROCYTE [DISTWIDTH] IN BLOOD BY AUTOMATED COUNT: 12.2 % (ref 12.3–15.4)
GEN 5 2HR TROPONIN T REFLEX: 12 NG/L
GLOBULIN UR ELPH-MCNC: 2.8 GM/DL
GLUCOSE SERPL-MCNC: 121 MG/DL (ref 65–99)
HCT VFR BLD AUTO: 40.7 % (ref 34–46.6)
HGB BLD-MCNC: 14 G/DL (ref 12–15.9)
IMM GRANULOCYTES # BLD AUTO: 0.04 10*3/MM3 (ref 0–0.05)
IMM GRANULOCYTES NFR BLD AUTO: 0.6 % (ref 0–0.5)
INR PPP: 0.94 (ref 0.9–1.1)
LIPASE SERPL-CCNC: 16 U/L (ref 13–60)
LYMPHOCYTES # BLD AUTO: 1.3 10*3/MM3 (ref 0.7–3.1)
LYMPHOCYTES NFR BLD AUTO: 18.8 % (ref 19.6–45.3)
MAGNESIUM SERPL-MCNC: 1.9 MG/DL (ref 1.6–2.4)
MCH RBC QN AUTO: 30.9 PG (ref 26.6–33)
MCHC RBC AUTO-ENTMCNC: 34.4 G/DL (ref 31.5–35.7)
MCV RBC AUTO: 89.8 FL (ref 79–97)
MONOCYTES # BLD AUTO: 0.33 10*3/MM3 (ref 0.1–0.9)
MONOCYTES NFR BLD AUTO: 4.8 % (ref 5–12)
NEUTROPHILS NFR BLD AUTO: 5.2 10*3/MM3 (ref 1.7–7)
NEUTROPHILS NFR BLD AUTO: 75.1 % (ref 42.7–76)
NRBC BLD AUTO-RTO: 0 /100 WBC (ref 0–0.2)
PLATELET # BLD AUTO: 366 10*3/MM3 (ref 140–450)
PMV BLD AUTO: 9.7 FL (ref 6–12)
POTASSIUM SERPL-SCNC: 3.4 MMOL/L (ref 3.5–5.2)
PROT SERPL-MCNC: 7.7 G/DL (ref 6–8.5)
PROTHROMBIN TIME: 12.7 SECONDS (ref 11.7–14.2)
RBC # BLD AUTO: 4.53 10*6/MM3 (ref 3.77–5.28)
SODIUM SERPL-SCNC: 140 MMOL/L (ref 136–145)
TROPONIN T DELTA: 0 NG/L
TROPONIN T SERPL HS-MCNC: 12 NG/L
WBC NRBC COR # BLD: 6.92 10*3/MM3 (ref 3.4–10.8)

## 2023-02-16 PROCEDURE — 84484 ASSAY OF TROPONIN QUANT: CPT | Performed by: EMERGENCY MEDICINE

## 2023-02-16 PROCEDURE — G0378 HOSPITAL OBSERVATION PER HR: HCPCS

## 2023-02-16 PROCEDURE — 80053 COMPREHEN METABOLIC PANEL: CPT | Performed by: EMERGENCY MEDICINE

## 2023-02-16 PROCEDURE — 99285 EMERGENCY DEPT VISIT HI MDM: CPT

## 2023-02-16 PROCEDURE — 25010000002 ONDANSETRON PER 1 MG: Performed by: EMERGENCY MEDICINE

## 2023-02-16 PROCEDURE — 96375 TX/PRO/DX INJ NEW DRUG ADDON: CPT

## 2023-02-16 PROCEDURE — 93005 ELECTROCARDIOGRAM TRACING: CPT

## 2023-02-16 PROCEDURE — 25010000002 SODIUM CHLORIDE 0.9 % WITH KCL 20 MEQ 20-0.9 MEQ/L-% SOLUTION: Performed by: INTERNAL MEDICINE

## 2023-02-16 PROCEDURE — 96376 TX/PRO/DX INJ SAME DRUG ADON: CPT

## 2023-02-16 PROCEDURE — 96361 HYDRATE IV INFUSION ADD-ON: CPT

## 2023-02-16 PROCEDURE — 83690 ASSAY OF LIPASE: CPT | Performed by: EMERGENCY MEDICINE

## 2023-02-16 PROCEDURE — 85025 COMPLETE CBC W/AUTO DIFF WBC: CPT | Performed by: EMERGENCY MEDICINE

## 2023-02-16 PROCEDURE — 85610 PROTHROMBIN TIME: CPT | Performed by: EMERGENCY MEDICINE

## 2023-02-16 PROCEDURE — 25010000002 HYDROMORPHONE PER 4 MG: Performed by: EMERGENCY MEDICINE

## 2023-02-16 PROCEDURE — 83735 ASSAY OF MAGNESIUM: CPT | Performed by: EMERGENCY MEDICINE

## 2023-02-16 PROCEDURE — 96374 THER/PROPH/DIAG INJ IV PUSH: CPT

## 2023-02-16 PROCEDURE — 25010000002 ONDANSETRON PER 1 MG: Performed by: INTERNAL MEDICINE

## 2023-02-16 PROCEDURE — 93010 ELECTROCARDIOGRAM REPORT: CPT | Performed by: INTERNAL MEDICINE

## 2023-02-16 PROCEDURE — 25010000002 IOPAMIDOL 61 % SOLUTION: Performed by: EMERGENCY MEDICINE

## 2023-02-16 PROCEDURE — 74177 CT ABD & PELVIS W/CONTRAST: CPT

## 2023-02-16 RX ORDER — POTASSIUM CHLORIDE 7.45 MG/ML
10 INJECTION INTRAVENOUS
Status: DISCONTINUED | OUTPATIENT
Start: 2023-02-16 | End: 2023-02-18 | Stop reason: HOSPADM

## 2023-02-16 RX ORDER — ALPRAZOLAM 1 MG/1
1 TABLET ORAL EVERY EVENING
Status: DISCONTINUED | OUTPATIENT
Start: 2023-02-17 | End: 2023-02-17

## 2023-02-16 RX ORDER — SODIUM CHLORIDE 0.9 % (FLUSH) 0.9 %
10 SYRINGE (ML) INJECTION AS NEEDED
Status: DISCONTINUED | OUTPATIENT
Start: 2023-02-16 | End: 2023-02-18 | Stop reason: HOSPADM

## 2023-02-16 RX ORDER — FENTANYL 50 UG/H
1 PATCH TRANSDERMAL
Status: DISCONTINUED | OUTPATIENT
Start: 2023-02-16 | End: 2023-02-17

## 2023-02-16 RX ORDER — ACETAMINOPHEN 325 MG/1
650 TABLET ORAL EVERY 4 HOURS PRN
Status: DISCONTINUED | OUTPATIENT
Start: 2023-02-16 | End: 2023-02-18 | Stop reason: HOSPADM

## 2023-02-16 RX ORDER — CITALOPRAM 40 MG/1
40 TABLET ORAL EVERY EVENING
Status: DISCONTINUED | OUTPATIENT
Start: 2023-02-17 | End: 2023-02-18 | Stop reason: HOSPADM

## 2023-02-16 RX ORDER — HYDROMORPHONE HYDROCHLORIDE 1 MG/ML
0.5 INJECTION, SOLUTION INTRAMUSCULAR; INTRAVENOUS; SUBCUTANEOUS ONCE
Status: COMPLETED | OUTPATIENT
Start: 2023-02-16 | End: 2023-02-16

## 2023-02-16 RX ORDER — POTASSIUM CHLORIDE 750 MG/1
40 TABLET, FILM COATED, EXTENDED RELEASE ORAL AS NEEDED
Status: DISCONTINUED | OUTPATIENT
Start: 2023-02-16 | End: 2023-02-18 | Stop reason: HOSPADM

## 2023-02-16 RX ORDER — ONDANSETRON 2 MG/ML
4 INJECTION INTRAMUSCULAR; INTRAVENOUS ONCE
Status: COMPLETED | OUTPATIENT
Start: 2023-02-16 | End: 2023-02-16

## 2023-02-16 RX ORDER — UREA 10 %
3 LOTION (ML) TOPICAL NIGHTLY PRN
Status: DISCONTINUED | OUTPATIENT
Start: 2023-02-16 | End: 2023-02-18 | Stop reason: HOSPADM

## 2023-02-16 RX ORDER — SODIUM CHLORIDE AND POTASSIUM CHLORIDE 150; 900 MG/100ML; MG/100ML
100 INJECTION, SOLUTION INTRAVENOUS CONTINUOUS
Status: DISCONTINUED | OUTPATIENT
Start: 2023-02-16 | End: 2023-02-18 | Stop reason: HOSPADM

## 2023-02-16 RX ORDER — SODIUM CHLORIDE 9 MG/ML
125 INJECTION, SOLUTION INTRAVENOUS CONTINUOUS
Status: DISCONTINUED | OUTPATIENT
Start: 2023-02-16 | End: 2023-02-16

## 2023-02-16 RX ORDER — ONDANSETRON 2 MG/ML
4 INJECTION INTRAMUSCULAR; INTRAVENOUS EVERY 6 HOURS PRN
Status: DISCONTINUED | OUTPATIENT
Start: 2023-02-16 | End: 2023-02-18 | Stop reason: HOSPADM

## 2023-02-16 RX ORDER — TIZANIDINE 4 MG/1
2 TABLET ORAL 3 TIMES DAILY PRN
Status: DISCONTINUED | OUTPATIENT
Start: 2023-02-16 | End: 2023-02-18 | Stop reason: HOSPADM

## 2023-02-16 RX ORDER — POTASSIUM CHLORIDE 1.5 G/1.77G
40 POWDER, FOR SOLUTION ORAL AS NEEDED
Status: DISCONTINUED | OUTPATIENT
Start: 2023-02-16 | End: 2023-02-18 | Stop reason: HOSPADM

## 2023-02-16 RX ORDER — HYDROCODONE BITARTRATE AND ACETAMINOPHEN 10; 325 MG/1; MG/1
1 TABLET ORAL EVERY 6 HOURS PRN
Status: DISCONTINUED | OUTPATIENT
Start: 2023-02-16 | End: 2023-02-18 | Stop reason: HOSPADM

## 2023-02-16 RX ORDER — ONDANSETRON 4 MG/1
4 TABLET, FILM COATED ORAL EVERY 6 HOURS PRN
Status: DISCONTINUED | OUTPATIENT
Start: 2023-02-16 | End: 2023-02-18 | Stop reason: HOSPADM

## 2023-02-16 RX ADMIN — POTASSIUM CHLORIDE AND SODIUM CHLORIDE 100 ML/HR: 900; 150 INJECTION, SOLUTION INTRAVENOUS at 22:18

## 2023-02-16 RX ADMIN — SODIUM CHLORIDE 1000 ML: 9 INJECTION, SOLUTION INTRAVENOUS at 14:43

## 2023-02-16 RX ADMIN — HYDROMORPHONE HYDROCHLORIDE 0.5 MG: 1 INJECTION, SOLUTION INTRAMUSCULAR; INTRAVENOUS; SUBCUTANEOUS at 14:43

## 2023-02-16 RX ADMIN — Medication 10 ML: at 21:31

## 2023-02-16 RX ADMIN — TIZANIDINE 2 MG: 4 TABLET ORAL at 22:16

## 2023-02-16 RX ADMIN — ONDANSETRON 4 MG: 2 INJECTION INTRAMUSCULAR; INTRAVENOUS at 14:42

## 2023-02-16 RX ADMIN — ONDANSETRON 4 MG: 2 INJECTION INTRAMUSCULAR; INTRAVENOUS at 21:30

## 2023-02-16 RX ADMIN — HYDROCODONE BITARTRATE AND ACETAMINOPHEN 1 TABLET: 10; 325 TABLET ORAL at 22:15

## 2023-02-16 RX ADMIN — HYDROMORPHONE HYDROCHLORIDE 0.5 MG: 1 INJECTION, SOLUTION INTRAMUSCULAR; INTRAVENOUS; SUBCUTANEOUS at 16:00

## 2023-02-16 RX ADMIN — IOPAMIDOL 85 ML: 612 INJECTION, SOLUTION INTRAVENOUS at 15:50

## 2023-02-16 NOTE — ED TRIAGE NOTES
Patient to ed from home via ems with complaints of abd pain, nausea and vomiting. Per ems, patient has been without fentanyl patches for 7 days.  Ems reports patient is in an arrhythmia. Had 4mg PO zofran in route.

## 2023-02-16 NOTE — ED PROVIDER NOTES
EMERGENCY DEPARTMENT ENCOUNTER    Room Number:  22/22  Date of encounter:  2/16/2023  PCP: Radu Rawls MD  Historian: Patient  Relevant information and history provided by sources other than the patient will be included below and in the ED Course.  Review of pertinent past medical records may also be included in record below and ED Course.    HPI:  Chief Complaint: I am withdrawing from fentanyl  A complete HPI/ROS/PMH/PSH/SH/FH are unobtainable due to: Not  Context: Meme Mcginnis is a 62 y.o. female who presents to the ED c/o patient takes fentanyl patches for chronic neck pain.  Is had neck surgeries and previous neck injury.  She removed her last fentanyl patch on February 9.  [facility] is on backorder and unable to get any fentanyl.  She spoke with her primary care physician Dr. Rawls who manages the fentanyl patch and he instructed her to either come into the office and be placed upon a new pain regime or if she is too sick to go to the emergency department.  She started getting sickness with abdominal pain and some nausea and vomiting about 2 to 3 days ago became much worse yesterday.  Not able to hold down any liquids at all.  Denies any vomiting blood.  Also has several bouts of diarrhea.  She feels shivering and chills and also feels restless.  Also reports diffuse abdominal pain.  Nothing makes the pain better or worse.  She has had a history of a similar episode in the past and she is pretty confident that this is from withdrawal of the fentanyl.  She has been on fentanyl long-term and other opioid pain medicine.  Denies any chest pain, shortness of breath denies any fever but has had chills.  Denies headache.  Denies any focal weakness in arms or legs.  She was too sick to go to Dr. Rawls's office and came here to the emergency department.        Previous Episodes: Yes  Current Symptoms: See above    MEDICAL HISTORY REVIEWED        PAST MEDICAL HISTORY  Active Ambulatory Problems      Diagnosis Date Noted   • Constipation 01/31/2022   • Abnormal CT scan, colon 01/31/2022   • Colitis, collagenous 01/31/2022   • Pain of upper abdomen 01/31/2022   • Non-intractable vomiting 01/31/2022   • Pleuritic chest pain 09/30/2022   • DDD (degenerative disc disease), cervical    • DDD (degenerative disc disease), lumbar    • Depression    • Pleurisy    • Chronic pain 10/03/2022     Resolved Ambulatory Problems     Diagnosis Date Noted   • No Resolved Ambulatory Problems     Past Medical History:   Diagnosis Date   • Allergic rhinitis    • Anxiety    • Arthralgia    • Chronic neck and back pain    • Chronic radicular low back pain    • Cough    • Fatigue    • Hyperlipidemia    • Insomnia    • Long term use of drug    • Neck pain    • Palpitations          PAST SURGICAL HISTORY  Past Surgical History:   Procedure Laterality Date   • COLONOSCOPY  1998         FAMILY HISTORY  History reviewed. No pertinent family history.      SOCIAL HISTORY  Social History     Socioeconomic History   • Marital status:    Tobacco Use   • Smoking status: Every Day     Packs/day: 0.25     Types: Cigarettes   • Smokeless tobacco: Never   Substance and Sexual Activity   • Alcohol use: Not Currently   • Drug use: Not Currently         ALLERGIES  Morphine and related        REVIEW OF SYSTEMS  Review of Systems     All systems reviewed and negative except for those discussed in HPI.       PHYSICAL EXAM    I have reviewed the triage vital signs and nursing notes.    ED Triage Vitals [02/16/23 1345]   Temp Heart Rate Resp BP SpO2   97.7 °F (36.5 °C) 76 18 109/79 99 %      Temp src Heart Rate Source Patient Position BP Location FiO2 (%)   Tympanic -- -- -- --       GENERAL: Elderly female that appears older than her stated age.  She has some clear gastric fluid small amount of emesis in the emesis bag.  No obvious blood.  She has some dry heaving's.  She looks uncomfortable.  She appears restless.  Vital signs on my initial  evaluation have been reviewed.  HENT: nares patent  Head/neck/ face are symmetric without gross deformity, signs of trauma, or swelling  EYES: no scleral icterus, no conjunctival pallor.  NECK: Supple, no meningismus  CV: regular rhythm, regular rate with intact distal pulses.  No murmur or rub  RESPIRATORY: normal effort and no respiratory distress.  Clear to auscultation bilateral  ABDOMEN: soft andr.  Generalized abdominal pain with no guarding or rebound.  Reports diffuse abdominal pain.  Normal bowel sounds  MUSCULOSKELETAL: no deformity.  No edema.  Intact distal pulses to upper and lower extremities that are equal strong and symmetric  NEURO: alert and appropriate, moves all extremities, follows commands.  No focal motor or sensory changes  SKIN: warm, dry    Vital signs and nursing notes reviewed.        LAB RESULTS  Recent Results (from the past 24 hour(s))   ECG 12 Lead Chest Pain    Collection Time: 02/16/23  2:19 PM   Result Value Ref Range    QT Interval 416 ms   Comprehensive Metabolic Panel    Collection Time: 02/16/23  2:40 PM    Specimen: Blood   Result Value Ref Range    Glucose 121 (H) 65 - 99 mg/dL    BUN 14 8 - 23 mg/dL    Creatinine 1.12 (H) 0.57 - 1.00 mg/dL    Sodium 140 136 - 145 mmol/L    Potassium 3.4 (L) 3.5 - 5.2 mmol/L    Chloride 97 (L) 98 - 107 mmol/L    CO2 22.7 22.0 - 29.0 mmol/L    Calcium 9.9 8.6 - 10.5 mg/dL    Total Protein 7.7 6.0 - 8.5 g/dL    Albumin 4.9 3.5 - 5.2 g/dL    ALT (SGPT) 12 1 - 33 U/L    AST (SGOT) 25 1 - 32 U/L    Alkaline Phosphatase 67 39 - 117 U/L    Total Bilirubin 0.3 0.0 - 1.2 mg/dL    Globulin 2.8 gm/dL    A/G Ratio 1.8 g/dL    BUN/Creatinine Ratio 12.5 7.0 - 25.0    Anion Gap 20.3 (H) 5.0 - 15.0 mmol/L    eGFR 56.4 (L) >60.0 mL/min/1.73   Protime-INR    Collection Time: 02/16/23  2:40 PM    Specimen: Blood   Result Value Ref Range    Protime 12.7 11.7 - 14.2 Seconds    INR 0.94 0.90 - 1.10   Lipase    Collection Time: 02/16/23  2:40 PM    Specimen: Blood    Result Value Ref Range    Lipase 16 13 - 60 U/L   Magnesium    Collection Time: 02/16/23  2:40 PM    Specimen: Blood   Result Value Ref Range    Magnesium 1.9 1.6 - 2.4 mg/dL   CBC Auto Differential    Collection Time: 02/16/23  2:40 PM    Specimen: Blood   Result Value Ref Range    WBC 6.92 3.40 - 10.80 10*3/mm3    RBC 4.53 3.77 - 5.28 10*6/mm3    Hemoglobin 14.0 12.0 - 15.9 g/dL    Hematocrit 40.7 34.0 - 46.6 %    MCV 89.8 79.0 - 97.0 fL    MCH 30.9 26.6 - 33.0 pg    MCHC 34.4 31.5 - 35.7 g/dL    RDW 12.2 (L) 12.3 - 15.4 %    RDW-SD 40.1 37.0 - 54.0 fl    MPV 9.7 6.0 - 12.0 fL    Platelets 366 140 - 450 10*3/mm3    Neutrophil % 75.1 42.7 - 76.0 %    Lymphocyte % 18.8 (L) 19.6 - 45.3 %    Monocyte % 4.8 (L) 5.0 - 12.0 %    Eosinophil % 0.3 0.3 - 6.2 %    Basophil % 0.4 0.0 - 1.5 %    Immature Grans % 0.6 (H) 0.0 - 0.5 %    Neutrophils, Absolute 5.20 1.70 - 7.00 10*3/mm3    Lymphocytes, Absolute 1.30 0.70 - 3.10 10*3/mm3    Monocytes, Absolute 0.33 0.10 - 0.90 10*3/mm3    Eosinophils, Absolute 0.02 0.00 - 0.40 10*3/mm3    Basophils, Absolute 0.03 0.00 - 0.20 10*3/mm3    Immature Grans, Absolute 0.04 0.00 - 0.05 10*3/mm3    nRBC 0.0 0.0 - 0.2 /100 WBC   High Sensitivity Troponin T    Collection Time: 02/16/23  2:40 PM    Specimen: Blood   Result Value Ref Range    HS Troponin T 12 (H) <10 ng/L       Ordered the above labs and independently reviewed the results.        RADIOLOGY  CT Abdomen Pelvis With Contrast    Result Date: 2/16/2023  CT ABDOMEN PELVIS W CONTRAST-  HISTORY:  Pain, vomiting, diarrhea.  TECHNIQUE:  CT of the abdomen and pelvis was performed following the administration of intravenous contrast. Radiation dose reduction techniques were utilized, including automated exposure control and exposure modulation based on body size.  COMPARISON:  CT abdomen and pelvis 9/11/2021  FINDINGS:  Heart size is normal. There is no pericardial effusion. There is linear subsegmental atelectasis in the left lower lobe.  Pleural spaces are clear. The liver is normal in size. There is diffuse hepatic steatosis. The gallbladder is present. There are calcified splenic granulomata. The pancreas is within normal limits. The adrenal glands are within normal limits. The kidneys are within normal limits. No pathologically enlarged abdominal or pelvic lymph nodes are identified. There is mild to moderate calcific atherosclerosis. There is no free fluid. There is a tiny hiatal hernia versus prominent distal esophageal vestibule. There is no bowel obstruction. Bladder is mildly distended. The uterus is not seen. There is no adnexal mass. There are old rib fractures. There is diffuse osseous demineralization.        Hepatic steatosis. Question tiny hiatal hernia. No bowel obstruction.  Discussed with Dr. Huang at 4:02 PM.  This report was finalized on 2/16/2023 4:02 PM by Dr. Isabelle Harman M.D.        I ordered the above noted radiological studies. Reviewed by me and discussed with radiologist.  See dictation for official radiology interpretation.      PROCEDURES    Procedures      MEDICATIONS GIVEN IN ER    Medications   sodium chloride 0.9 % flush 10 mL (has no administration in time range)   sodium chloride 0.9 % infusion (has no administration in time range)   acetaminophen (TYLENOL) tablet 650 mg (has no administration in time range)   ondansetron (ZOFRAN) tablet 4 mg (has no administration in time range)     Or   ondansetron (ZOFRAN) injection 4 mg (has no administration in time range)   melatonin tablet 3 mg (has no administration in time range)   HYDROmorphone (DILAUDID) injection 0.5 mg (0.5 mg Intravenous Given 2/16/23 1443)   ondansetron (ZOFRAN) injection 4 mg (4 mg Intravenous Given 2/16/23 1442)   sodium chloride 0.9 % bolus 1,000 mL (0 mL Intravenous Stopped 2/16/23 1915)   iopamidol (ISOVUE-300) 61 % injection 100 mL (85 mL Intravenous Given by Other 2/16/23 1550)   HYDROmorphone (DILAUDID) injection 0.5 mg (0.5 mg  Intravenous Given 2/16/23 1600)         All labs have been independently reviewed by me.  All radiology studies have been reviewed by me and I discussed with radiologist dictating the report when indicated below.  All EKG's independently viewed and interpreted by me.  Discussion below represents my analysis of pertinent findings related to patient's condition, differential diagnosis, treatment plan and final disposition.        PROGRESS, DATA ANALYSIS, CONSULTS, AND MEDICAL DECISION MAKING    Differential diagnosis includes   - hepatobiliary pathology such as cholecystitis, cholangitis, and symptomatic cholelithiasis  -PUD  -Mesenteric ischemia  - Pancreatitis  - Dyspepsia  - Small bowel or large bowel obstruction  - Appendicitis  - Diverticulitis  - UTI including pyelonephritis  - Ureteral stone  - Zoster  - Colitis, including infectious and ischemic  - Atypical ACS  I suspect this patient is likely having opiate withdrawal.  The patient's clinical presentation and symptoms are consistent and has a history of similar episodes but not to this extent.  She is unable to get the fentanyl prescription filled because she states that [facility] is on backorder.  We will get a give her IV Dilaudid.  She is tolerated Dilaudid before.  We will also give her some IV fluids and Zofran and check lab work and a CT scan of the abdomen pelvis.  All questions answered    We are currently under a pandemic from the COVID19 infection.  The patient presented to the emergency department by ambulance or personal vehicle. I followed the current protocols required by Infection Control at [facility] in my evaluation and treatment of the patient. The patient was wearing a face mask during my evaluation and throughout my encounter. During my whole encounter with this patient I used appropriate personal protective equipment.  This equipment consisted of eye protection, facemask, gown, and gloves.  I applied this equipment before  entering the room.        ED Course as of 02/16/23 1931   u Feb 16, 2023   1427 EKG  EKG was done at 2:19 PM it is independently reviewed by me  It is a rate of 95 and has ventricular bigeminy  Appears sinus rhythm  Narrow complex with normal axis  I do not see any definitive acute injury pattern there are some nonspecific ST and T wave changes  I compared to the previous EKG she was not in bigeminy at that time.  The QRS complexes otherwise look very similar   [MM]   1712 I discussed the results of the CT scan of the abdomen pelvis with Dr. Harman.  Patient has a fatty liver and a tiny hiatal hernia.  Otherwise no acute abnormality appreciated.  Please see complete dictated report [MM]   1716 Patient is doing better.  Vomiting has resolved.  Still has some residual nausea.  Pain is much better.  Informed the results of the lab work as well as the electrolytes and CT scan. [MM]   1716 Anion Gap(!): 20.3  Like related to the vomiting and the diarrhea. [MM]   1716 I think with the elevation of the anion gap and her symptoms likely from opioid withdrawal plan is to admit her and continue replace fluids. [MM]   1756 I discussed the case with Dr. Adam who is on for A.  She agrees to admit the patient to the hospital.  All questions answered. [MM]      ED Course User Index  [MM] Polo Huang MD       AS OF 19:31 EST VITALS:    BP - 120/75  HR - 57  TEMP - 97.7 °F (36.5 °C) (Tympanic)  02 SATS - 90%    SOCIAL DETERMINANTS OF HEALTH THAT IMPACT OR LIMIT CARE (For example..Homelessness,safe discharge, inability to obtain care, follow up, or prescriptions):      DIAGNOSIS  Final diagnoses:   Abdominal pain, vomiting, and diarrhea   Opiate withdrawal (HCC)         DISPOSITION  I have reviewed the test results with my patient and explained the current treatment plan.  I answered all the patient's questions and concerns.  The patient is hemodynamically stable and is in no distress and is appropriate to be admitted to a  medical/surgical floor bed at this time.            DICTATED UTILIZING DRAGON DICTATION    Note Disclaimer: At [facility], we believe that sharing information builds trust and better relationships. You are receiving this note because you recently visited [facility]. It is possible you will see health information before a provider has talked with you about it. This kind of information can be easy to misunderstand. To help you fully understand what it means for your health, we urge you to discuss this note with your provider.     Polo Huang MD  02/16/23 1937

## 2023-02-17 PROBLEM — D64.9 ANEMIA: Status: ACTIVE | Noted: 2023-02-17

## 2023-02-17 PROBLEM — F11.93 OPIOID WITHDRAWAL (HCC): Status: ACTIVE | Noted: 2023-02-17

## 2023-02-17 PROBLEM — F11.20 OPIOID DEPENDENCE (HCC): Status: ACTIVE | Noted: 2023-02-17

## 2023-02-17 PROBLEM — E87.6 HYPOKALEMIA: Status: ACTIVE | Noted: 2023-02-17

## 2023-02-17 LAB
ANION GAP SERPL CALCULATED.3IONS-SCNC: 6.9 MMOL/L (ref 5–15)
BUN SERPL-MCNC: 22 MG/DL (ref 8–23)
BUN/CREAT SERPL: 28.2 (ref 7–25)
CALCIUM SPEC-SCNC: 8.2 MG/DL (ref 8.6–10.5)
CHLORIDE SERPL-SCNC: 107 MMOL/L (ref 98–107)
CO2 SERPL-SCNC: 23.1 MMOL/L (ref 22–29)
CREAT SERPL-MCNC: 0.78 MG/DL (ref 0.57–1)
DEPRECATED RDW RBC AUTO: 41.3 FL (ref 37–54)
EGFRCR SERPLBLD CKD-EPI 2021: 87.1 ML/MIN/1.73
ERYTHROCYTE [DISTWIDTH] IN BLOOD BY AUTOMATED COUNT: 12.2 % (ref 12.3–15.4)
FERRITIN SERPL-MCNC: 61.5 NG/ML (ref 13–150)
GLUCOSE BLDC GLUCOMTR-MCNC: 103 MG/DL (ref 70–130)
GLUCOSE SERPL-MCNC: 98 MG/DL (ref 65–99)
HCT VFR BLD AUTO: 29.6 % (ref 34–46.6)
HGB BLD-MCNC: 9.8 G/DL (ref 12–15.9)
IRON 24H UR-MRATE: 123 MCG/DL (ref 37–145)
IRON SATN MFR SERPL: 43 % (ref 20–50)
MCH RBC QN AUTO: 30.5 PG (ref 26.6–33)
MCHC RBC AUTO-ENTMCNC: 33.1 G/DL (ref 31.5–35.7)
MCV RBC AUTO: 92.2 FL (ref 79–97)
PLATELET # BLD AUTO: 247 10*3/MM3 (ref 140–450)
PMV BLD AUTO: 9.6 FL (ref 6–12)
POTASSIUM SERPL-SCNC: 4.1 MMOL/L (ref 3.5–5.2)
QT INTERVAL: 416 MS
RBC # BLD AUTO: 3.21 10*6/MM3 (ref 3.77–5.28)
SODIUM SERPL-SCNC: 137 MMOL/L (ref 136–145)
TIBC SERPL-MCNC: 289 MCG/DL (ref 298–536)
TRANSFERRIN SERPL-MCNC: 194 MG/DL (ref 200–360)
WBC NRBC COR # BLD: 5.94 10*3/MM3 (ref 3.4–10.8)

## 2023-02-17 PROCEDURE — 63710000001 ONDANSETRON PER 8 MG: Performed by: INTERNAL MEDICINE

## 2023-02-17 PROCEDURE — 82728 ASSAY OF FERRITIN: CPT | Performed by: STUDENT IN AN ORGANIZED HEALTH CARE EDUCATION/TRAINING PROGRAM

## 2023-02-17 PROCEDURE — 85027 COMPLETE CBC AUTOMATED: CPT | Performed by: INTERNAL MEDICINE

## 2023-02-17 PROCEDURE — 96376 TX/PRO/DX INJ SAME DRUG ADON: CPT

## 2023-02-17 PROCEDURE — 84466 ASSAY OF TRANSFERRIN: CPT | Performed by: STUDENT IN AN ORGANIZED HEALTH CARE EDUCATION/TRAINING PROGRAM

## 2023-02-17 PROCEDURE — 96361 HYDRATE IV INFUSION ADD-ON: CPT

## 2023-02-17 PROCEDURE — 82962 GLUCOSE BLOOD TEST: CPT

## 2023-02-17 PROCEDURE — 83540 ASSAY OF IRON: CPT | Performed by: STUDENT IN AN ORGANIZED HEALTH CARE EDUCATION/TRAINING PROGRAM

## 2023-02-17 PROCEDURE — 25010000002 ONDANSETRON PER 1 MG: Performed by: INTERNAL MEDICINE

## 2023-02-17 PROCEDURE — 25010000002 SODIUM CHLORIDE 0.9 % WITH KCL 20 MEQ 20-0.9 MEQ/L-% SOLUTION: Performed by: INTERNAL MEDICINE

## 2023-02-17 PROCEDURE — 96375 TX/PRO/DX INJ NEW DRUG ADDON: CPT

## 2023-02-17 PROCEDURE — 80048 BASIC METABOLIC PNL TOTAL CA: CPT | Performed by: INTERNAL MEDICINE

## 2023-02-17 PROCEDURE — G0378 HOSPITAL OBSERVATION PER HR: HCPCS

## 2023-02-17 PROCEDURE — 25010000002 LORAZEPAM PER 2 MG: Performed by: STUDENT IN AN ORGANIZED HEALTH CARE EDUCATION/TRAINING PROGRAM

## 2023-02-17 PROCEDURE — 36415 COLL VENOUS BLD VENIPUNCTURE: CPT | Performed by: INTERNAL MEDICINE

## 2023-02-17 RX ORDER — HYDROXYZINE HYDROCHLORIDE 25 MG/1
25 TABLET, FILM COATED ORAL 3 TIMES DAILY PRN
Status: DISCONTINUED | OUTPATIENT
Start: 2023-02-17 | End: 2023-02-18 | Stop reason: HOSPADM

## 2023-02-17 RX ORDER — ALPRAZOLAM 1 MG/1
1 TABLET ORAL EVERY EVENING
Status: DISCONTINUED | OUTPATIENT
Start: 2023-02-17 | End: 2023-02-17

## 2023-02-17 RX ORDER — FENTANYL 25 UG/H
1 PATCH TRANSDERMAL
Qty: 4 PATCH | Refills: 0 | OUTPATIENT
Start: 2023-02-17 | End: 2023-02-27

## 2023-02-17 RX ORDER — LOPERAMIDE HYDROCHLORIDE 2 MG/1
2 CAPSULE ORAL 4 TIMES DAILY PRN
Status: DISCONTINUED | OUTPATIENT
Start: 2023-02-17 | End: 2023-02-18 | Stop reason: HOSPADM

## 2023-02-17 RX ORDER — FENTANYL 25 UG/H
1 PATCH TRANSDERMAL
Status: DISCONTINUED | OUTPATIENT
Start: 2023-02-17 | End: 2023-02-18 | Stop reason: HOSPADM

## 2023-02-17 RX ORDER — LORAZEPAM 2 MG/ML
1 INJECTION INTRAMUSCULAR ONCE
Status: COMPLETED | OUTPATIENT
Start: 2023-02-17 | End: 2023-02-17

## 2023-02-17 RX ORDER — ALPRAZOLAM 0.5 MG/1
0.5 TABLET ORAL 2 TIMES DAILY PRN
Status: DISCONTINUED | OUTPATIENT
Start: 2023-02-17 | End: 2023-02-18 | Stop reason: HOSPADM

## 2023-02-17 RX ADMIN — HYDROCODONE BITARTRATE AND ACETAMINOPHEN 1 TABLET: 10; 325 TABLET ORAL at 10:30

## 2023-02-17 RX ADMIN — ALPRAZOLAM 0.5 MG: 0.5 TABLET ORAL at 11:06

## 2023-02-17 RX ADMIN — TIZANIDINE 2 MG: 4 TABLET ORAL at 12:53

## 2023-02-17 RX ADMIN — FENTANYL 1 PATCH: 25 PATCH TRANSDERMAL at 20:27

## 2023-02-17 RX ADMIN — HYDROCODONE BITARTRATE AND ACETAMINOPHEN 1 TABLET: 10; 325 TABLET ORAL at 18:23

## 2023-02-17 RX ADMIN — LORAZEPAM 1 MG: 2 INJECTION INTRAMUSCULAR; INTRAVENOUS at 14:15

## 2023-02-17 RX ADMIN — ALPRAZOLAM 1 MG: 1 TABLET ORAL at 05:50

## 2023-02-17 RX ADMIN — POTASSIUM CHLORIDE AND SODIUM CHLORIDE 100 ML/HR: 900; 150 INJECTION, SOLUTION INTRAVENOUS at 10:11

## 2023-02-17 RX ADMIN — TIZANIDINE 2 MG: 4 TABLET ORAL at 20:16

## 2023-02-17 RX ADMIN — ONDANSETRON HYDROCHLORIDE 4 MG: 4 TABLET, FILM COATED ORAL at 20:16

## 2023-02-17 RX ADMIN — HYDROXYZINE HYDROCHLORIDE 25 MG: 25 TABLET ORAL at 15:25

## 2023-02-17 RX ADMIN — FENTANYL 1 PATCH: 25 PATCH TRANSDERMAL at 11:06

## 2023-02-17 RX ADMIN — CITALOPRAM 40 MG: 40 TABLET, FILM COATED ORAL at 18:22

## 2023-02-17 RX ADMIN — ONDANSETRON 4 MG: 2 INJECTION INTRAMUSCULAR; INTRAVENOUS at 13:55

## 2023-02-17 RX ADMIN — HYDROCODONE BITARTRATE AND ACETAMINOPHEN 1 TABLET: 10; 325 TABLET ORAL at 04:38

## 2023-02-17 RX ADMIN — ALPRAZOLAM 0.5 MG: 0.5 TABLET ORAL at 23:12

## 2023-02-17 NOTE — PLAN OF CARE
Goal Outcome Evaluation:  Plan of Care Reviewed With: patient        Progress: improving  Outcome Evaluation: New admit from ED this shift. Hydrocodone given x2 for chronic pain and Zofran given for nausea. Patient did not want to resume fentanyl patch due to already being off of it for 7 days, informed BLANE Valerio and she said just to nova as refused. IVFs going NS w/ 20 meq K. Standby assistance. SR on monitor. VSS.

## 2023-02-17 NOTE — ED NOTES
"Nursing report ED to floor  Meme Mcginnis  62 y.o.  female    HPI :   Chief Complaint   Patient presents with    Abdominal Pain    Vomiting       Admitting doctor:   Ayesha Adam MD    Admitting diagnosis:   The primary encounter diagnosis was Abdominal pain, vomiting, and diarrhea. A diagnosis of Opiate withdrawal (HCC) was also pertinent to this visit.    Code status:   Current Code Status       Date Active Code Status Order ID Comments User Context       2/16/2023 1838 CPR (Attempt to Resuscitate) [id number]  Ayesha Adam MD ED        Question Answer    Code Status (Patient has no pulse and is not breathing) CPR (Attempt to Resuscitate)    Medical Interventions (Patient has pulse or is breathing) Full                    Allergies:   Morphine and related    Isolation:   No active isolations    Intake and Output    Intake/Output Summary (Last 24 hours) at 2/16/2023 1918  Last data filed at 2/16/2023 1915  Gross per 24 hour   Intake 2000 ml   Output --   Net 2000 ml       Weight:       02/16/23  1726   Weight: 52.2 kg (115 lb)       Most recent vitals:   Vitals:    02/16/23 1431 02/16/23 1651 02/16/23 1726 02/16/23 1902   BP: 144/68 121/65 120/72 120/75   BP Location:  Right arm     Patient Position:  Lying     Pulse: 78 70 67 57   Resp:  12 11 16   Temp:       TempSrc:       SpO2: 94% 92% 90%    Weight:   52.2 kg (115 lb)    Height:   165.1 cm (65\")        Active LDAs/IV Access:   Lines, Drains & Airways       Active LDAs       Name Placement date Placement time Site Days    Peripheral IV 02/16/23 1430 Left Antecubital 02/16/23  1430  Antecubital  less than 1                    Labs (abnormal labs have a star):   Labs Reviewed   COMPREHENSIVE METABOLIC PANEL - Abnormal; Notable for the following components:       Result Value    Glucose 121 (*)     Creatinine 1.12 (*)     Potassium 3.4 (*)     Chloride 97 (*)     Anion Gap 20.3 (*)     eGFR 56.4 (*)     All other components within normal limits "    Narrative:     GFR Normal >60  Chronic Kidney Disease <60  Kidney Failure <15     CBC WITH AUTO DIFFERENTIAL - Abnormal; Notable for the following components:    RDW 12.2 (*)     Lymphocyte % 18.8 (*)     Monocyte % 4.8 (*)     Immature Grans % 0.6 (*)     All other components within normal limits   TROPONIN - Abnormal; Notable for the following components:    HS Troponin T 12 (*)     All other components within normal limits    Narrative:     High Sensitive Troponin T Reference Range:  <10.0 ng/L- Negative Female for AMI  <15.0 ng/L- Negative Male for AMI  >=10 - Abnormal Female indicating possible myocardial injury.  >=15 - Abnormal Male indicating possible myocardial injury.   Clinicians would have to utilize clinical acumen, EKG, Troponin, and serial changes to determine if it is an Acute Myocardial Infarction or myocardial injury due to an underlying chronic condition.        PROTIME-INR - Normal   LIPASE - Normal   MAGNESIUM - Normal   URINALYSIS W/ MICROSCOPIC IF INDICATED (NO CULTURE)   HIGH SENSITIVITIY TROPONIN T 2HR   CBC AND DIFFERENTIAL    Narrative:     The following orders were created for panel order CBC & Differential.  Procedure                               Abnormality         Status                     ---------                               -----------         ------                     CBC Auto Differential[[id number]]        Abnormal            Final result                 Please view results for these tests on the individual orders.       EKG:   ECG 12 Lead Chest Pain   Preliminary Result   HEART RATE= 95  bpm   RR Interval= 632  ms   IN Interval= 136  ms   P Horizontal Axis= 8  deg   P Front Axis= 81  deg   QRSD Interval= 92  ms   QT Interval= 416  ms   QRS Axis= 88  deg   T Wave Axis= -76  deg   - ABNORMAL ECG -   Sinus rhythm   Ventricular bigeminy   Borderline right axis deviation   Nonspecific T abnormalities, inferior leads   Electronically Signed By:    Date and Time of Study:  2023-02-16 14:19:31          Meds given in ED:   Medications   sodium chloride 0.9 % flush 10 mL (has no administration in time range)   sodium chloride 0.9 % infusion (has no administration in time range)   acetaminophen (TYLENOL) tablet 650 mg (has no administration in time range)   ondansetron (ZOFRAN) tablet 4 mg (has no administration in time range)     Or   ondansetron (ZOFRAN) injection 4 mg (has no administration in time range)   melatonin tablet 3 mg (has no administration in time range)   HYDROmorphone (DILAUDID) injection 0.5 mg (0.5 mg Intravenous Given 2/16/23 1443)   ondansetron (ZOFRAN) injection 4 mg (4 mg Intravenous Given 2/16/23 1442)   sodium chloride 0.9 % bolus 1,000 mL (0 mL Intravenous Stopped 2/16/23 1915)   iopamidol (ISOVUE-300) 61 % injection 100 mL (85 mL Intravenous Given by Other 2/16/23 1550)   HYDROmorphone (DILAUDID) injection 0.5 mg (0.5 mg Intravenous Given 2/16/23 1600)       Imaging results:  CT Abdomen Pelvis With Contrast    Result Date: 2/16/2023   Hepatic steatosis. Question tiny hiatal hernia. No bowel obstruction.  Discussed with Dr. Huang at 4:02 PM.  This report was finalized on 2/16/2023 4:02 PM by Dr. Isabelle Harman M.D.       Ambulatory status:   - up with assistance.     Social issues:   Social History     Socioeconomic History    Marital status:    Tobacco Use    Smoking status: Every Day     Packs/day: 0.25     Types: Cigarettes    Smokeless tobacco: Never   Substance and Sexual Activity    Alcohol use: Not Currently    Drug use: Not Currently       NIH Stroke Scale:         Bre Aguilar, BOLIVAR  02/16/23 19:18 EST

## 2023-02-17 NOTE — ED NOTES
Pt states that she is feeling much better.  Her nausea and pain are under control.  She is resting comfortably at this time.

## 2023-02-17 NOTE — PROGRESS NOTES
Name: Meme Mcginnis ADMIT: 2023   : 1960  PCP: Radu Rawls MD    MRN: [id number] LOS: 0 days   AGE/SEX: 62 y.o. female  ROOM: [facility]     Subjective   Subjective   Patient seen this morning, laying comfortably in bed.  About to eat breakfast.  Thinks that she can tolerate toast and eggs.  Nausea and vomiting, abdominal pain much improved. patient declined fentanyl patch yesterday evening.  States that she wants to stop taking them.  Discussed that with her high dosage of fentanyl patch that this would be easier to do over a longer period of time in a stepwise fashion instead of stopping her 50 mcg patch all at once.  Patient agreeable to try 25 mcg patch.    Review of Systems   Constitutional: Negative for chills and fever.   Respiratory: Negative for cough and shortness of breath.    Cardiovascular: Negative for chest pain and leg swelling.   Gastrointestinal: Negative for abdominal pain, diarrhea and nausea.   Psychiatric/Behavioral:        + Anxiety     As above     Objective   Objective   Vital Signs  Temp:  [98.2 °F (36.8 °C)-98.9 °F (37.2 °C)] 98.7 °F (37.1 °C)  Heart Rate:  [57-73] 73  Resp:  [11-20] 20  BP: (103-123)/(52-77) 123/77  SpO2:  [90 %-100 %] 100 %  on   ;   Device (Oxygen Therapy): room air  Body mass index is 20.63 kg/m².  Physical Exam  Constitutional:       General: She is not in acute distress.     Appearance: She is not diaphoretic.      Comments: Anxious   Cardiovascular:      Rate and Rhythm: Normal rate and regular rhythm.      Heart sounds: No murmur heard.  Pulmonary:      Effort: Pulmonary effort is normal. No respiratory distress.   Abdominal:      General: Abdomen is flat. There is no distension.      Palpations: Abdomen is soft.      Tenderness: There is no abdominal tenderness.   Musculoskeletal:         General: No swelling or deformity. Normal range of motion.   Skin:     General: Skin is warm and dry.   Neurological:      General: No focal deficit  present.      Mental Status: She is alert and oriented to person, place, and time.         Results Review     I reviewed the patient's new clinical results.  Results from last 7 days   Lab Units 02/17/23  0740 02/16/23  1440   WBC 10*3/mm3 5.94 6.92   HEMOGLOBIN g/dL 9.8* 14.0   PLATELETS 10*3/mm3 247 366     Results from last 7 days   Lab Units 02/17/23  0740 02/16/23  1440   SODIUM mmol/L 137 140   POTASSIUM mmol/L 4.1 3.4*   CHLORIDE mmol/L 107 97*   CO2 mmol/L 23.1 22.7   BUN mg/dL 22 14   CREATININE mg/dL 0.78 1.12*   GLUCOSE mg/dL 98 121*   Estimated Creatinine Clearance: 68 mL/min (by C-G formula based on SCr of 0.78 mg/dL).  Results from last 7 days   Lab Units 02/16/23  1440   ALBUMIN g/dL 4.9   BILIRUBIN mg/dL 0.3   ALK PHOS U/L 67   AST (SGOT) U/L 25   ALT (SGPT) U/L 12     Results from last 7 days   Lab Units 02/17/23  0740 02/16/23  1440   CALCIUM mg/dL 8.2* 9.9   ALBUMIN g/dL  --  4.9   MAGNESIUM mg/dL  --  1.9       COVID19   Date Value Ref Range Status   09/30/2022 Not Detected Not Detected - Ref. Range Final   04/27/2022 Not Detected Not Detected - Ref. Range Final     Glucose   Date/Time Value Ref Range Status   02/17/2023 1021 103 70 - 130 mg/dL Final     Comment:     Meter: [id number] : [id number] Michael Wagner RN       CT Abdomen Pelvis With Contrast  Narrative: CT ABDOMEN PELVIS W CONTRAST-     HISTORY:  Pain, vomiting, diarrhea.      TECHNIQUE:  CT of the abdomen and pelvis was performed following the  administration of intravenous contrast. Radiation dose reduction  techniques were utilized, including automated exposure control and  exposure modulation based on body size.     COMPARISON:  CT abdomen and pelvis 9/11/2021     FINDINGS:     Heart size is normal. There is no pericardial effusion. There is linear  subsegmental atelectasis in the left lower lobe. Pleural spaces are  clear.  The liver is normal in size. There is diffuse hepatic steatosis. The  gallbladder is present. There  are calcified splenic granulomata. The  pancreas is within normal limits. The adrenal glands are within normal  limits. The kidneys are within normal limits.  No pathologically enlarged abdominal or pelvic lymph nodes are  identified. There is mild to moderate calcific atherosclerosis. There is  no free fluid.   There is a tiny hiatal hernia versus prominent distal esophageal  vestibule. There is no bowel obstruction. Bladder is mildly distended.  The uterus is not seen. There is no adnexal mass.  There are old rib fractures. There is diffuse osseous demineralization.        Impression:    Hepatic steatosis. Question tiny hiatal hernia. No bowel obstruction.     Discussed with Dr. Huang at 4:02 PM.     This report was finalized on 2/16/2023 4:02 PM by Dr. Isabelle Harman M.D.       I reviewed the patient's daily medications.  Scheduled Medications  fentaNYL, 1 patch, Transdermal, Q72H   And  [START ON 2/18/2023] Check Fentanyl Patch Placement, 1 each, Does not apply, Q12H  citalopram, 40 mg, Oral, Q PM    Infusions  sodium chloride 0.9 % with KCl 20 mEq, 100 mL/hr, Last Rate: 100 mL/hr (02/17/23 1011)    Diet  Diet: Cardiac Diets; Healthy Heart (2-3 Na+); Texture: Regular Texture (IDDSI 7); Fluid Consistency: Thin (IDDSI 0)         I have personally reviewed:  [x]  Laboratory   []  Microbiology   [x]  Radiology   [x]  EKG/Telemetry   []  Cardiology/Vascular   []  Pathology   [x]  Records     Assessment/Plan     Active Hospital Problems    Diagnosis  POA   • **Abdominal pain, vomiting, and diarrhea [R10.9, R11.10, R19.7]  Yes   • Opioid withdrawal (HCC) [F11.93]  Unknown   • Opioid dependence (HCC) [F11.20]  Unknown   • Anemia [D64.9]  Unknown   • Hypokalemia [E87.6]  Unknown      Resolved Hospital Problems   No resolved problems to display.       62 y.o. female admitted with Abdominal pain, vomiting, and diarrhea.    Opioid dependence and withdrawal  Nausea, vomiting, diarrhea  -Yaw reviewed, patient getting  fentanyl 50 mcg patch, hydrocodone 10 mg, 5 tablets a day, loprazolam 0.5 mg twice daily from her primary care doctor  -[facility] f[facility]anyl patches, and states will be on backorder until March  -Patient wanting to stop using the patches, discussed that this should be done by weaning the patches over time instead of cold turkey as she is on high doses of opioids   -Patient agreeable to 25 mcg patch, other medications above continued  -Symptomatic management  -Case management reached out to the primary care office, and they are okay if we dispense a few fentanyl patches from our pharmacy     Anemia  -Hemoglobin from 14 to 9.9  -No active signs of bleeding, definitely component of hemoconcentration    Hypokalemia-replete per protocol    Depression-continue home Celexa    · SCDs for DVT prophylaxis.  · Full code.  · Discussed with patient and nursing staff.  · Anticipate discharge home tomorrow.      Gopi Durand MD  [facility]  02/17/23  16:09 EST

## 2023-02-17 NOTE — H&P
HISTORY AND PHYSICAL   [facility]        Date of Admission: 2023  Patient Identification:  Name: Meme Mcginnis  Age: 62 y.o.  Sex: female  :  1960  MRN: [id number]                     Primary Care Physician: Radu Rawls MD    Chief Complaint:  62 year old female who presented to the emergency room with nausea, vomiting, abdominal pain which started two to three days ago; she has a history of back and neck pain and is on fentanyl patches; when she went to get them refilled the pharmacy did not have any available so she has been with her medications for a week; she denies sick contacts; no fever or chills; she feels better after receiving pain meds in the ED    History of Present Illness:   As above    Past Medical History:  Past Medical History:   Diagnosis Date   • Allergic rhinitis    • Anxiety    • Arthralgia    • Chronic neck and back pain    • Chronic radicular low back pain    • COPD (chronic obstructive pulmonary disease) (HCC)    • Cough    • DDD (degenerative disc disease), cervical    • DDD (degenerative disc disease), lumbar    • Depression    • Fatigue    • Hyperlipidemia    • Insomnia    • Long term use of drug    • Neck pain    • Palpitations    • Rheumatoid arthritis (HCC)      Past Surgical History:  Past Surgical History:   Procedure Laterality Date   • COLONOSCOPY        Home Meds:  Medications Prior to Admission   Medication Sig Dispense Refill Last Dose   • ALPRAZolam (XANAX) 1 MG tablet Take 1 mg by mouth Every Evening.   2023   • citalopram (CeleXA) 40 MG tablet Take 40 mg by mouth Every Evening.   2023   • HYDROcodone-acetaminophen (NORCO)  MG per tablet Take 1 tablet by mouth every 6 (six) hours as needed for moderate pain (4-6).   2023   • tiZANidine (ZANAFLEX) 4 MG tablet Take 2 mg by mouth 3 (Three) Times a Day As Needed. Pt takes half a tablet.   2023   • diclofenac (CATAFLAM) 50 MG tablet Take 50 mg by mouth 3 (Three)  Times a Day.      • fentaNYL (DURAGESIC) 50 MCG/HR patch Place 1 patch on the skin every third day.   2023   • methylPREDNISolone (MEDROL) 4 MG dose pack Take as directed on package instructions. 21 tablet 0        Allergies:  No Known Allergies  Immunizations:  Immunization History   Administered Date(s) Administered   • COVID-19 (MODERNA) 1st, 2nd, 3rd Dose Only 2021, 2021     Social History:   Social History     Social History Narrative   • Not on file     Social History     Socioeconomic History   • Marital status:    Tobacco Use   • Smoking status: Former     Packs/day: 0.25     Years: 35.00     Pack years: 8.75     Types: Cigarettes     Quit date: 10/16/2022     Years since quittin.3   • Smokeless tobacco: Never   Vaping Use   • Vaping Use: Some days   • Substances: Nicotine   Substance and Sexual Activity   • Alcohol use: Not Currently   • Drug use: Not Currently   • Sexual activity: Defer       Family History:  Family History   Problem Relation Age of Onset   • Atrial fibrillation Mother         Review of Systems  See history of present illness and past medical history.  Patient denies headache, dizziness, syncope, falls, trauma, change in vision, change in hearing, change in taste, changes in weight, changes in appetite, focal weakness, numbness, or paresthesia.  Patient denies chest pain, palpitations, dyspnea, orthopnea, PND, cough, sinus pressure, rhinorrhea, epistaxis, hemoptysis hematemesis, diarrhea, constipation or hematchezia.  Denies cold or heat intolerance, polydipsia, polyuria, polyphagia. Denies hematuria, pyuria, dysuria, hesitancy, frequency or urgency. Denies consumption of raw and under cooked meats foods or change in water source.  Denies fever, chills, sweats, night sweats.  Denies missing any routine medications. Remainder of ROS is negative.    Objective:  T Max 24 hrs: Temp (24hrs), Av.3 °F (36.8 °C), Min:97.7 °F (36.5 °C), Max:98.9 °F (37.2  "°C)    Vitals Ranges:   Temp:  [97.7 °F (36.5 °C)-98.9 °F (37.2 °C)] 98.9 °F (37.2 °C)  Heart Rate:  [57-78] 62  Resp:  [11-18] 16  BP: (109-144)/(58-79) 115/58      Exam:  /58 (BP Location: Left arm, Patient Position: Lying)   Pulse 62   Temp 98.9 °F (37.2 °C) (Oral)   Resp 16   Ht 165.1 cm (65\")   Wt 56.4 kg (124 lb 6.4 oz)   SpO2 95%   BMI 20.70 kg/m²     General Appearance:    Alert, cooperative, no distress, appears stated age; thin, chronically ill appearing   Head:    Normocephalic, without obvious abnormality, atraumatic   Eyes:    PERRL, conjunctivae/corneas clear, EOM's intact, both eyes   Ears:    Normal external ear canals, both ears   Nose:   Nares normal, septum midline, mucosa normal, no drainage    or sinus tenderness   Throat:   Lips, mucosa, and tongue normal   Neck:   Supple, symmetrical, trachea midline, no adenopathy;     thyroid:  no enlargement/tenderness/nodules; no carotid    bruit or JVD   Back:     Symmetric, no curvature, ROM normal, no CVA tenderness   Lungs:     Decreased breath sounds bilaterally, respirations unlabored   Chest Wall:    No tenderness or deformity    Heart:    Regular rate and rhythm, S1 and S2 normal, no murmur, rub   or gallop   Abdomen:     Soft, nontender, bowel sounds active all four quadrants,     no masses, no hepatomegaly, no splenomegaly   Extremities:   Extremities normal, atraumatic, no cyanosis or edema   Pulses:   2+ and symmetric all extremities   Skin:   Skin color, texture, turgor normal, no rashes or lesions               .    Data Review:  Labs in chart were reviewed.  WBC   Date Value Ref Range Status   02/16/2023 6.92 3.40 - 10.80 10*3/mm3 Final     Hemoglobin   Date Value Ref Range Status   02/16/2023 14.0 12.0 - 15.9 g/dL Final     Hematocrit   Date Value Ref Range Status   02/16/2023 40.7 34.0 - 46.6 % Final     Platelets   Date Value Ref Range Status   02/16/2023 366 140 - 450 10*3/mm3 Final     Sodium   Date Value Ref Range Status "   02/16/2023 140 136 - 145 mmol/L Final     Potassium   Date Value Ref Range Status   02/16/2023 3.4 (L) 3.5 - 5.2 mmol/L Final     Comment:     Slight hemolysis detected by analyzer. Results may be affected.     Chloride   Date Value Ref Range Status   02/16/2023 97 (L) 98 - 107 mmol/L Final     CO2   Date Value Ref Range Status   02/16/2023 22.7 22.0 - 29.0 mmol/L Final     BUN   Date Value Ref Range Status   02/16/2023 14 8 - 23 mg/dL Final     Creatinine   Date Value Ref Range Status   02/16/2023 1.12 (H) 0.57 - 1.00 mg/dL Final     Glucose   Date Value Ref Range Status   02/16/2023 121 (H) 65 - 99 mg/dL Final     Calcium   Date Value Ref Range Status   02/16/2023 9.9 8.6 - 10.5 mg/dL Final     Magnesium   Date Value Ref Range Status   02/16/2023 1.9 1.6 - 2.4 mg/dL Final     AST (SGOT)   Date Value Ref Range Status   02/16/2023 25 1 - 32 U/L Final     ALT (SGPT)   Date Value Ref Range Status   02/16/2023 12 1 - 33 U/L Final     Alkaline Phosphatase   Date Value Ref Range Status   02/16/2023 67 39 - 117 U/L Final                Imaging Results (All)     Procedure Component Value Units Date/Time    CT Abdomen Pelvis With Contrast [[id number]] Collected: 02/16/23 1555     Updated: 02/16/23 1605    Narrative:      CT ABDOMEN PELVIS W CONTRAST-     HISTORY:  Pain, vomiting, diarrhea.      TECHNIQUE:  CT of the abdomen and pelvis was performed following the  administration of intravenous contrast. Radiation dose reduction  techniques were utilized, including automated exposure control and  exposure modulation based on body size.     COMPARISON:  CT abdomen and pelvis 9/11/2021     FINDINGS:     Heart size is normal. There is no pericardial effusion. There is linear  subsegmental atelectasis in the left lower lobe. Pleural spaces are  clear.  The liver is normal in size. There is diffuse hepatic steatosis. The  gallbladder is present. There are calcified splenic granulomata. The  pancreas is within normal limits. The  adrenal glands are within normal  limits. The kidneys are within normal limits.  No pathologically enlarged abdominal or pelvic lymph nodes are  identified. There is mild to moderate calcific atherosclerosis. There is  no free fluid.   There is a tiny hiatal hernia versus prominent distal esophageal  vestibule. There is no bowel obstruction. Bladder is mildly distended.  The uterus is not seen. There is no adnexal mass.  There are old rib fractures. There is diffuse osseous demineralization.          Impression:         Hepatic steatosis. Question tiny hiatal hernia. No bowel obstruction.     Discussed with Dr. Huang at 4:02 PM.     This report was finalized on 2/16/2023 4:02 PM by Dr. Isabelle Harman M.D.               Assessment:  Active Hospital Problems    Diagnosis  POA   • **Abdominal pain, vomiting, and diarrhea [R10.9, R11.10, R19.7]  Yes      Resolved Hospital Problems   No resolved problems to display.   opioid withdrawal  Opioid dependence  Back pain  Rheumatoid arthritis  Copd  Hypokalemia  hyperglycemia    Plan:  Will restart pain meds at home dose  Fluids  Antiemetics  Replace potassium  D.w patient and ED provider    Ayesha Adam MD  2/16/2023  21:50 EST

## 2023-02-17 NOTE — NURSING NOTE
Pt increasing restless, anxious, frequent requests for medications, uncooperative with staff. Pt found down on floor at 1020 by staff, safe report completed. Provider notified, see new orders. Pt intermittently nauseous and having loose stools, call light and bedside commode within reach. Pt removed IV, new IV placed by IV therapy, pt removed new IV within 30 mins, refused staff attempts for new access.

## 2023-02-18 VITALS
HEART RATE: 94 BPM | BODY MASS INDEX: 19.84 KG/M2 | OXYGEN SATURATION: 100 % | DIASTOLIC BLOOD PRESSURE: 63 MMHG | RESPIRATION RATE: 18 BRPM | TEMPERATURE: 98.2 F | HEIGHT: 65 IN | WEIGHT: 119.1 LBS | SYSTOLIC BLOOD PRESSURE: 156 MMHG

## 2023-02-18 LAB
ANION GAP SERPL CALCULATED.3IONS-SCNC: 13.7 MMOL/L (ref 5–15)
BASOPHILS # BLD AUTO: 0.01 10*3/MM3 (ref 0–0.2)
BASOPHILS NFR BLD AUTO: 0.1 % (ref 0–1.5)
BUN SERPL-MCNC: 20 MG/DL (ref 8–23)
BUN/CREAT SERPL: 28.6 (ref 7–25)
CALCIUM SPEC-SCNC: 8.6 MG/DL (ref 8.6–10.5)
CHLORIDE SERPL-SCNC: 102 MMOL/L (ref 98–107)
CO2 SERPL-SCNC: 21.3 MMOL/L (ref 22–29)
CREAT SERPL-MCNC: 0.7 MG/DL (ref 0.57–1)
DEPRECATED RDW RBC AUTO: 40.4 FL (ref 37–54)
EGFRCR SERPLBLD CKD-EPI 2021: 99.2 ML/MIN/1.73
EOSINOPHIL # BLD AUTO: 0 10*3/MM3 (ref 0–0.4)
EOSINOPHIL NFR BLD AUTO: 0 % (ref 0.3–6.2)
ERYTHROCYTE [DISTWIDTH] IN BLOOD BY AUTOMATED COUNT: 12.4 % (ref 12.3–15.4)
FOLATE SERPL-MCNC: >20 NG/ML (ref 4.78–24.2)
GLUCOSE SERPL-MCNC: 112 MG/DL (ref 65–99)
HCT VFR BLD AUTO: 24.8 % (ref 34–46.6)
HGB BLD-MCNC: 8.4 G/DL (ref 12–15.9)
IMM GRANULOCYTES # BLD AUTO: 0.05 10*3/MM3 (ref 0–0.05)
IMM GRANULOCYTES NFR BLD AUTO: 0.5 % (ref 0–0.5)
LYMPHOCYTES # BLD AUTO: 1.97 10*3/MM3 (ref 0.7–3.1)
LYMPHOCYTES NFR BLD AUTO: 21.6 % (ref 19.6–45.3)
MCH RBC QN AUTO: 31 PG (ref 26.6–33)
MCHC RBC AUTO-ENTMCNC: 33.9 G/DL (ref 31.5–35.7)
MCV RBC AUTO: 91.5 FL (ref 79–97)
MONOCYTES # BLD AUTO: 0.69 10*3/MM3 (ref 0.1–0.9)
MONOCYTES NFR BLD AUTO: 7.6 % (ref 5–12)
NEUTROPHILS NFR BLD AUTO: 6.39 10*3/MM3 (ref 1.7–7)
NEUTROPHILS NFR BLD AUTO: 70.2 % (ref 42.7–76)
NRBC BLD AUTO-RTO: 0 /100 WBC (ref 0–0.2)
PLATELET # BLD AUTO: 303 10*3/MM3 (ref 140–450)
PMV BLD AUTO: 10 FL (ref 6–12)
POTASSIUM SERPL-SCNC: 3.6 MMOL/L (ref 3.5–5.2)
RBC # BLD AUTO: 2.71 10*6/MM3 (ref 3.77–5.28)
SODIUM SERPL-SCNC: 137 MMOL/L (ref 136–145)
VIT B12 BLD-MCNC: 1030 PG/ML (ref 211–946)
WBC NRBC COR # BLD: 9.11 10*3/MM3 (ref 3.4–10.8)

## 2023-02-18 PROCEDURE — 82607 VITAMIN B-12: CPT | Performed by: STUDENT IN AN ORGANIZED HEALTH CARE EDUCATION/TRAINING PROGRAM

## 2023-02-18 PROCEDURE — 80048 BASIC METABOLIC PNL TOTAL CA: CPT | Performed by: STUDENT IN AN ORGANIZED HEALTH CARE EDUCATION/TRAINING PROGRAM

## 2023-02-18 PROCEDURE — 25010000002 ONDANSETRON PER 1 MG: Performed by: INTERNAL MEDICINE

## 2023-02-18 PROCEDURE — 85025 COMPLETE CBC W/AUTO DIFF WBC: CPT | Performed by: STUDENT IN AN ORGANIZED HEALTH CARE EDUCATION/TRAINING PROGRAM

## 2023-02-18 PROCEDURE — 82746 ASSAY OF FOLIC ACID SERUM: CPT | Performed by: STUDENT IN AN ORGANIZED HEALTH CARE EDUCATION/TRAINING PROGRAM

## 2023-02-18 PROCEDURE — 25010000002 SODIUM CHLORIDE 0.9 % WITH KCL 20 MEQ 20-0.9 MEQ/L-% SOLUTION: Performed by: INTERNAL MEDICINE

## 2023-02-18 PROCEDURE — 96376 TX/PRO/DX INJ SAME DRUG ADON: CPT

## 2023-02-18 PROCEDURE — G0378 HOSPITAL OBSERVATION PER HR: HCPCS

## 2023-02-18 PROCEDURE — 63710000001 ONDANSETRON PER 8 MG: Performed by: INTERNAL MEDICINE

## 2023-02-18 PROCEDURE — 96361 HYDRATE IV INFUSION ADD-ON: CPT

## 2023-02-18 RX ADMIN — HYDROCODONE BITARTRATE AND ACETAMINOPHEN 1 TABLET: 10; 325 TABLET ORAL at 06:32

## 2023-02-18 RX ADMIN — TIZANIDINE 2 MG: 4 TABLET ORAL at 10:43

## 2023-02-18 RX ADMIN — HYDROCODONE BITARTRATE AND ACETAMINOPHEN 1 TABLET: 10; 325 TABLET ORAL at 00:35

## 2023-02-18 RX ADMIN — ONDANSETRON HYDROCHLORIDE 4 MG: 4 TABLET, FILM COATED ORAL at 08:35

## 2023-02-18 RX ADMIN — ONDANSETRON 4 MG: 2 INJECTION INTRAMUSCULAR; INTRAVENOUS at 02:04

## 2023-02-18 RX ADMIN — ACETAMINOPHEN 650 MG: 325 TABLET, FILM COATED ORAL at 06:34

## 2023-02-18 RX ADMIN — ALPRAZOLAM 0.5 MG: 0.5 TABLET ORAL at 10:43

## 2023-02-18 RX ADMIN — POTASSIUM CHLORIDE AND SODIUM CHLORIDE 100 ML/HR: 900; 150 INJECTION, SOLUTION INTRAVENOUS at 06:00

## 2023-02-18 NOTE — PLAN OF CARE
Goal Outcome Evaluation:  Plan of Care Reviewed With: patient        Progress: no change  Outcome Evaluation: Irritable, restless, and anxious all shift. Frequently requesting pain and nausea medicine early. Previous 25mcg fentanyl patch fell off prior to shift and could not be located by dayshift RN. New patch reapplied to back side of right arm. New IV obtained this shift. IVFs infusing. Zofran given x2. States she placed an emergency call to her PCP to discuss her medications with Hospitalist. Explained that they restarted her medications at her home dose. Frequent complaints of nausea, neck and back pain. Bed alarm in place due to fall yesterday. COWS score ranging from 12-19. PRN xanax given with no relief. Needs met as much as possible. No vomiting or diarrhea through the night. DC home today.

## 2023-02-18 NOTE — NURSING NOTE
Patient has requested to leave AMA, paper has been signed and IV removed, home meds have been returned pharmacy

## 2023-02-21 NOTE — CASE MANAGEMENT/SOCIAL WORK
Case Management Discharge Note      Final Note: left AMA         Selected Continued Care - Discharged on 2/18/2023 Admission date: 2/16/2023 - Discharge disposition: Left Against Medical Advice    Destination    No services have been selected for the patient.              Durable Medical Equipment    No services have been selected for the patient.              Dialysis/Infusion    No services have been selected for the patient.              Home Medical Care    No services have been selected for the patient.              Therapy    No services have been selected for the patient.              Community Resources    No services have been selected for the patient.              Community & DME    No services have been selected for the patient.                       Final Discharge Disposition Code: 07 - left AMA

## 2024-01-04 ENCOUNTER — APPOINTMENT (OUTPATIENT)
Dept: GENERAL RADIOLOGY | Facility: HOSPITAL | Age: 64
End: 2024-01-04
Payer: MEDICARE

## 2024-01-04 ENCOUNTER — HOSPITAL ENCOUNTER (INPATIENT)
Facility: HOSPITAL | Age: 64
LOS: 6 days | Discharge: HOME | End: 2024-01-10
Attending: EMERGENCY MEDICINE | Admitting: INTERNAL MEDICINE
Payer: MEDICARE

## 2024-01-04 ENCOUNTER — APPOINTMENT (OUTPATIENT)
Dept: CT IMAGING | Facility: HOSPITAL | Age: 64
End: 2024-01-04
Payer: MEDICARE

## 2024-01-04 DIAGNOSIS — N17.9 ACUTE RENAL FAILURE, UNSPECIFIED ACUTE RENAL FAILURE TYPE: ICD-10-CM

## 2024-01-04 DIAGNOSIS — R79.89 ELEVATED PROCALCITONIN: ICD-10-CM

## 2024-01-04 DIAGNOSIS — G89.29 OTHER CHRONIC PAIN: ICD-10-CM

## 2024-01-04 DIAGNOSIS — R79.89 TROPONIN LEVEL ELEVATED: ICD-10-CM

## 2024-01-04 DIAGNOSIS — K92.2 ACUTE UPPER GI BLEED: Primary | ICD-10-CM

## 2024-01-04 DIAGNOSIS — E87.20 LACTIC ACID INCREASED: ICD-10-CM

## 2024-01-04 DIAGNOSIS — D64.9 ANEMIA, UNSPECIFIED TYPE: ICD-10-CM

## 2024-01-04 DIAGNOSIS — K52.9 COLITIS: ICD-10-CM

## 2024-01-04 LAB
ABO GROUP BLD: NORMAL
ABO GROUP BLD: NORMAL
ALBUMIN SERPL-MCNC: 4.6 G/DL (ref 3.5–5.2)
ALBUMIN/GLOB SERPL: 2.1 G/DL
ALP SERPL-CCNC: 44 U/L (ref 39–117)
ALT SERPL W P-5'-P-CCNC: 23 U/L (ref 1–33)
ANION GAP SERPL CALCULATED.3IONS-SCNC: 19.7 MMOL/L (ref 5–15)
APTT PPP: 21.6 SECONDS (ref 22.7–35.4)
AST SERPL-CCNC: 14 U/L (ref 1–32)
BACTERIA UR QL AUTO: ABNORMAL /HPF
BASOPHILS # BLD AUTO: 0.01 10*3/MM3 (ref 0–0.2)
BASOPHILS # BLD AUTO: 0.01 10*3/MM3 (ref 0–0.2)
BASOPHILS NFR BLD AUTO: 0.1 % (ref 0–1.5)
BASOPHILS NFR BLD AUTO: 0.1 % (ref 0–1.5)
BILIRUB SERPL-MCNC: 0.2 MG/DL (ref 0–1.2)
BILIRUB UR QL STRIP: NEGATIVE
BLD GP AB SCN SERPL QL: NEGATIVE
BUN SERPL-MCNC: 74 MG/DL (ref 8–23)
BUN/CREAT SERPL: 59.2 (ref 7–25)
CALCIUM SPEC-SCNC: 9.1 MG/DL (ref 8.6–10.5)
CHLORIDE SERPL-SCNC: 96 MMOL/L (ref 98–107)
CLARITY UR: CLEAR
CO2 SERPL-SCNC: 16.3 MMOL/L (ref 22–29)
COLOR UR: YELLOW
CREAT SERPL-MCNC: 1.25 MG/DL (ref 0.57–1)
D-LACTATE SERPL-SCNC: 2.7 MMOL/L (ref 0.5–2)
D-LACTATE SERPL-SCNC: 3.8 MMOL/L (ref 0.5–2)
D-LACTATE SERPL-SCNC: 5.9 MMOL/L (ref 0.5–2)
DEPRECATED RDW RBC AUTO: 43.5 FL (ref 37–54)
DEPRECATED RDW RBC AUTO: 44.6 FL (ref 37–54)
EGFRCR SERPLBLD CKD-EPI 2021: 49.1 ML/MIN/1.73
EOSINOPHIL # BLD AUTO: 0 10*3/MM3 (ref 0–0.4)
EOSINOPHIL # BLD AUTO: 0 10*3/MM3 (ref 0–0.4)
EOSINOPHIL NFR BLD AUTO: 0 % (ref 0.3–6.2)
EOSINOPHIL NFR BLD AUTO: 0 % (ref 0.3–6.2)
ERYTHROCYTE [DISTWIDTH] IN BLOOD BY AUTOMATED COUNT: 12.9 % (ref 12.3–15.4)
ERYTHROCYTE [DISTWIDTH] IN BLOOD BY AUTOMATED COUNT: 13.1 % (ref 12.3–15.4)
EXPIRATION DATE: ABNORMAL
FECAL OCCULT BLOOD SCREEN, POC: POSITIVE
GEN 5 2HR TROPONIN T REFLEX: 33 NG/L
GLOBULIN UR ELPH-MCNC: 2.2 GM/DL
GLUCOSE BLDC GLUCOMTR-MCNC: 108 MG/DL (ref 70–130)
GLUCOSE SERPL-MCNC: 185 MG/DL (ref 65–99)
GLUCOSE UR STRIP-MCNC: NEGATIVE MG/DL
HCT VFR BLD AUTO: 25.6 % (ref 34–46.6)
HCT VFR BLD AUTO: 32.3 % (ref 34–46.6)
HGB BLD-MCNC: 10.7 G/DL (ref 12–15.9)
HGB BLD-MCNC: 8.4 G/DL (ref 12–15.9)
HGB UR QL STRIP.AUTO: ABNORMAL
HOLD SPECIMEN: NORMAL
HOLD SPECIMEN: NORMAL
HYALINE CASTS UR QL AUTO: ABNORMAL /LPF
IMM GRANULOCYTES # BLD AUTO: 0.09 10*3/MM3 (ref 0–0.05)
IMM GRANULOCYTES # BLD AUTO: 0.11 10*3/MM3 (ref 0–0.05)
IMM GRANULOCYTES NFR BLD AUTO: 0.8 % (ref 0–0.5)
IMM GRANULOCYTES NFR BLD AUTO: 0.8 % (ref 0–0.5)
INR PPP: 1.15 (ref 0.9–1.1)
KETONES UR QL STRIP: NEGATIVE
LEUKOCYTE ESTERASE UR QL STRIP.AUTO: ABNORMAL
LIPASE SERPL-CCNC: 28 U/L (ref 13–60)
LYMPHOCYTES # BLD AUTO: 2.25 10*3/MM3 (ref 0.7–3.1)
LYMPHOCYTES # BLD AUTO: 2.75 10*3/MM3 (ref 0.7–3.1)
LYMPHOCYTES NFR BLD AUTO: 16.9 % (ref 19.6–45.3)
LYMPHOCYTES NFR BLD AUTO: 23 % (ref 19.6–45.3)
Lab: 230
MAGNESIUM SERPL-MCNC: 2.8 MG/DL (ref 1.6–2.4)
MCH RBC QN AUTO: 30.1 PG (ref 26.6–33)
MCH RBC QN AUTO: 31.5 PG (ref 26.6–33)
MCHC RBC AUTO-ENTMCNC: 32.8 G/DL (ref 31.5–35.7)
MCHC RBC AUTO-ENTMCNC: 33.1 G/DL (ref 31.5–35.7)
MCV RBC AUTO: 91.8 FL (ref 79–97)
MCV RBC AUTO: 95 FL (ref 79–97)
MONOCYTES # BLD AUTO: 0.72 10*3/MM3 (ref 0.1–0.9)
MONOCYTES # BLD AUTO: 0.77 10*3/MM3 (ref 0.1–0.9)
MONOCYTES NFR BLD AUTO: 5.8 % (ref 5–12)
MONOCYTES NFR BLD AUTO: 6 % (ref 5–12)
NEGATIVE CONTROL: NEGATIVE
NEUTROPHILS NFR BLD AUTO: 10.16 10*3/MM3 (ref 1.7–7)
NEUTROPHILS NFR BLD AUTO: 70.1 % (ref 42.7–76)
NEUTROPHILS NFR BLD AUTO: 76.4 % (ref 42.7–76)
NEUTROPHILS NFR BLD AUTO: 8.38 10*3/MM3 (ref 1.7–7)
NITRITE UR QL STRIP: NEGATIVE
NRBC BLD AUTO-RTO: 0 /100 WBC (ref 0–0.2)
NRBC BLD AUTO-RTO: 0 /100 WBC (ref 0–0.2)
NT-PROBNP SERPL-MCNC: 1683 PG/ML (ref 0–900)
PH UR STRIP.AUTO: 5.5 [PH] (ref 5–8)
PLATELET # BLD AUTO: 279 10*3/MM3 (ref 140–450)
PLATELET # BLD AUTO: 361 10*3/MM3 (ref 140–450)
PMV BLD AUTO: 9.6 FL (ref 6–12)
PMV BLD AUTO: 9.9 FL (ref 6–12)
POSITIVE CONTROL: POSITIVE
POTASSIUM SERPL-SCNC: 2.9 MMOL/L (ref 3.5–5.2)
PROCALCITONIN SERPL-MCNC: 6.14 NG/ML (ref 0–0.25)
PROT SERPL-MCNC: 6.8 G/DL (ref 6–8.5)
PROT UR QL STRIP: NEGATIVE
PROTHROMBIN TIME: 14.9 SECONDS (ref 11.7–14.2)
QT INTERVAL: 438 MS
QTC INTERVAL: 582 MS
RBC # BLD AUTO: 2.79 10*6/MM3 (ref 3.77–5.28)
RBC # BLD AUTO: 3.4 10*6/MM3 (ref 3.77–5.28)
RBC # UR STRIP: ABNORMAL /HPF
REF LAB TEST METHOD: ABNORMAL
RH BLD: POSITIVE
RH BLD: POSITIVE
SODIUM SERPL-SCNC: 132 MMOL/L (ref 136–145)
SP GR UR STRIP: >=1.03 (ref 1–1.03)
SQUAMOUS #/AREA URNS HPF: ABNORMAL /HPF
T&S EXPIRATION DATE: NORMAL
TROPONIN T DELTA: -6 NG/L
TROPONIN T SERPL HS-MCNC: 39 NG/L
UROBILINOGEN UR QL STRIP: ABNORMAL
WBC # UR STRIP: ABNORMAL /HPF
WBC NRBC COR # BLD AUTO: 11.95 10*3/MM3 (ref 3.4–10.8)
WBC NRBC COR # BLD AUTO: 13.3 10*3/MM3 (ref 3.4–10.8)
WHOLE BLOOD HOLD SPECIMEN: NORMAL

## 2024-01-04 PROCEDURE — 86900 BLOOD TYPING SEROLOGIC ABO: CPT

## 2024-01-04 PROCEDURE — 25810000003 SODIUM CHLORIDE 0.9 % SOLUTION: Performed by: EMERGENCY MEDICINE

## 2024-01-04 PROCEDURE — 83690 ASSAY OF LIPASE: CPT | Performed by: EMERGENCY MEDICINE

## 2024-01-04 PROCEDURE — 85730 THROMBOPLASTIN TIME PARTIAL: CPT | Performed by: EMERGENCY MEDICINE

## 2024-01-04 PROCEDURE — 25810000003 SODIUM CHLORIDE 0.9 % SOLUTION: Performed by: INTERNAL MEDICINE

## 2024-01-04 PROCEDURE — 25010000002 METHYLPREDNISOLONE PER 40 MG: Performed by: INTERNAL MEDICINE

## 2024-01-04 PROCEDURE — 93005 ELECTROCARDIOGRAM TRACING: CPT | Performed by: EMERGENCY MEDICINE

## 2024-01-04 PROCEDURE — 85610 PROTHROMBIN TIME: CPT | Performed by: EMERGENCY MEDICINE

## 2024-01-04 PROCEDURE — 83880 ASSAY OF NATRIURETIC PEPTIDE: CPT | Performed by: EMERGENCY MEDICINE

## 2024-01-04 PROCEDURE — 83735 ASSAY OF MAGNESIUM: CPT | Performed by: EMERGENCY MEDICINE

## 2024-01-04 PROCEDURE — 86901 BLOOD TYPING SEROLOGIC RH(D): CPT

## 2024-01-04 PROCEDURE — 25510000001 IOPAMIDOL PER 1 ML: Performed by: EMERGENCY MEDICINE

## 2024-01-04 PROCEDURE — 86923 COMPATIBILITY TEST ELECTRIC: CPT

## 2024-01-04 PROCEDURE — 81001 URINALYSIS AUTO W/SCOPE: CPT | Performed by: EMERGENCY MEDICINE

## 2024-01-04 PROCEDURE — 93010 ELECTROCARDIOGRAM REPORT: CPT | Performed by: INTERNAL MEDICINE

## 2024-01-04 PROCEDURE — 87040 BLOOD CULTURE FOR BACTERIA: CPT | Performed by: EMERGENCY MEDICINE

## 2024-01-04 PROCEDURE — 84484 ASSAY OF TROPONIN QUANT: CPT | Performed by: EMERGENCY MEDICINE

## 2024-01-04 PROCEDURE — 71045 X-RAY EXAM CHEST 1 VIEW: CPT

## 2024-01-04 PROCEDURE — 82948 REAGENT STRIP/BLOOD GLUCOSE: CPT

## 2024-01-04 PROCEDURE — 72125 CT NECK SPINE W/O DYE: CPT

## 2024-01-04 PROCEDURE — 80053 COMPREHEN METABOLIC PANEL: CPT

## 2024-01-04 PROCEDURE — 85025 COMPLETE CBC W/AUTO DIFF WBC: CPT | Performed by: INTERNAL MEDICINE

## 2024-01-04 PROCEDURE — P9016 RBC LEUKOCYTES REDUCED: HCPCS

## 2024-01-04 PROCEDURE — 86850 RBC ANTIBODY SCREEN: CPT

## 2024-01-04 PROCEDURE — 25010000002 CEFTRIAXONE PER 250 MG: Performed by: EMERGENCY MEDICINE

## 2024-01-04 PROCEDURE — 36415 COLL VENOUS BLD VENIPUNCTURE: CPT

## 2024-01-04 PROCEDURE — 74174 CTA ABD&PLVS W/CONTRAST: CPT

## 2024-01-04 PROCEDURE — 25010000002 ONDANSETRON PER 1 MG: Performed by: EMERGENCY MEDICINE

## 2024-01-04 PROCEDURE — 70450 CT HEAD/BRAIN W/O DYE: CPT

## 2024-01-04 PROCEDURE — 84145 PROCALCITONIN (PCT): CPT | Performed by: EMERGENCY MEDICINE

## 2024-01-04 PROCEDURE — 83605 ASSAY OF LACTIC ACID: CPT

## 2024-01-04 PROCEDURE — 85025 COMPLETE CBC W/AUTO DIFF WBC: CPT

## 2024-01-04 PROCEDURE — 82270 OCCULT BLOOD FECES: CPT

## 2024-01-04 PROCEDURE — 36430 TRANSFUSION BLD/BLD COMPNT: CPT

## 2024-01-04 PROCEDURE — 25010000002 FENTANYL CITRATE (PF) 50 MCG/ML SOLUTION: Performed by: EMERGENCY MEDICINE

## 2024-01-04 PROCEDURE — 99291 CRITICAL CARE FIRST HOUR: CPT

## 2024-01-04 RX ORDER — ALPRAZOLAM 0.5 MG/1
1 TABLET ORAL EVERY EVENING
Status: DISCONTINUED | OUTPATIENT
Start: 2024-01-04 | End: 2024-01-05

## 2024-01-04 RX ORDER — ONDANSETRON 2 MG/ML
4 INJECTION INTRAMUSCULAR; INTRAVENOUS ONCE
Status: COMPLETED | OUTPATIENT
Start: 2024-01-04 | End: 2024-01-04

## 2024-01-04 RX ORDER — PANTOPRAZOLE SODIUM 40 MG/10ML
80 INJECTION, POWDER, LYOPHILIZED, FOR SOLUTION INTRAVENOUS ONCE
Status: COMPLETED | OUTPATIENT
Start: 2024-01-04 | End: 2024-01-04

## 2024-01-04 RX ORDER — CITALOPRAM 40 MG/1
40 TABLET ORAL EVERY EVENING
Status: DISCONTINUED | OUTPATIENT
Start: 2024-01-04 | End: 2024-01-05

## 2024-01-04 RX ORDER — METRONIDAZOLE 500 MG/100ML
500 INJECTION, SOLUTION INTRAVENOUS EVERY 8 HOURS
Status: DISCONTINUED | OUTPATIENT
Start: 2024-01-04 | End: 2024-01-10 | Stop reason: HOSPADM

## 2024-01-04 RX ORDER — SODIUM CHLORIDE 0.9 % (FLUSH) 0.9 %
10 SYRINGE (ML) INJECTION AS NEEDED
Status: DISCONTINUED | OUTPATIENT
Start: 2024-01-04 | End: 2024-01-10 | Stop reason: HOSPADM

## 2024-01-04 RX ORDER — HYDROCODONE BITARTRATE AND ACETAMINOPHEN 10; 325 MG/1; MG/1
1 TABLET ORAL EVERY 6 HOURS PRN
Status: DISCONTINUED | OUTPATIENT
Start: 2024-01-04 | End: 2024-01-10 | Stop reason: HOSPADM

## 2024-01-04 RX ORDER — TIZANIDINE 4 MG/1
2 TABLET ORAL 3 TIMES DAILY PRN
Status: DISCONTINUED | OUTPATIENT
Start: 2024-01-04 | End: 2024-01-06

## 2024-01-04 RX ORDER — FENTANYL CITRATE 50 UG/ML
50 INJECTION, SOLUTION INTRAMUSCULAR; INTRAVENOUS ONCE
Status: COMPLETED | OUTPATIENT
Start: 2024-01-04 | End: 2024-01-04

## 2024-01-04 RX ORDER — FENTANYL 100 UG/1
1 PATCH TRANSDERMAL
Status: DISCONTINUED | OUTPATIENT
Start: 2024-01-05 | End: 2024-01-05

## 2024-01-04 RX ORDER — METHYLPREDNISOLONE SODIUM SUCCINATE 40 MG/ML
40 INJECTION, POWDER, LYOPHILIZED, FOR SOLUTION INTRAMUSCULAR; INTRAVENOUS EVERY 8 HOURS
Status: DISCONTINUED | OUTPATIENT
Start: 2024-01-04 | End: 2024-01-10 | Stop reason: HOSPADM

## 2024-01-04 RX ORDER — SODIUM CHLORIDE 9 MG/ML
100 INJECTION, SOLUTION INTRAVENOUS CONTINUOUS
Status: DISCONTINUED | OUTPATIENT
Start: 2024-01-04 | End: 2024-01-05

## 2024-01-04 RX ORDER — ONDANSETRON 2 MG/ML
4 INJECTION INTRAMUSCULAR; INTRAVENOUS EVERY 6 HOURS PRN
Status: DISCONTINUED | OUTPATIENT
Start: 2024-01-04 | End: 2024-01-10 | Stop reason: HOSPADM

## 2024-01-04 RX ADMIN — IOPAMIDOL 95 ML: 755 INJECTION, SOLUTION INTRAVENOUS at 18:07

## 2024-01-04 RX ADMIN — ONDANSETRON 4 MG: 2 INJECTION INTRAMUSCULAR; INTRAVENOUS at 17:25

## 2024-01-04 RX ADMIN — CEFTRIAXONE 2000 MG: 2 INJECTION, POWDER, FOR SOLUTION INTRAMUSCULAR; INTRAVENOUS at 17:42

## 2024-01-04 RX ADMIN — METHYLPREDNISOLONE SODIUM SUCCINATE 40 MG: 40 INJECTION INTRAMUSCULAR; INTRAVENOUS at 23:51

## 2024-01-04 RX ADMIN — SODIUM CHLORIDE 8 MG/HR: 9 INJECTION, SOLUTION INTRAVENOUS at 23:52

## 2024-01-04 RX ADMIN — SODIUM CHLORIDE 1752 ML: 9 INJECTION, SOLUTION INTRAVENOUS at 16:30

## 2024-01-04 RX ADMIN — SODIUM CHLORIDE 1000 ML: 9 INJECTION, SOLUTION INTRAVENOUS at 15:58

## 2024-01-04 RX ADMIN — SODIUM CHLORIDE 1752 ML: 9 INJECTION, SOLUTION INTRAVENOUS at 17:25

## 2024-01-04 RX ADMIN — PANTOPRAZOLE SODIUM 80 MG: 40 INJECTION, POWDER, FOR SOLUTION INTRAVENOUS at 16:26

## 2024-01-04 RX ADMIN — SODIUM CHLORIDE 8 MG/HR: 9 INJECTION, SOLUTION INTRAVENOUS at 16:37

## 2024-01-04 RX ADMIN — SODIUM CHLORIDE 100 ML/HR: 9 INJECTION, SOLUTION INTRAVENOUS at 23:52

## 2024-01-04 RX ADMIN — FENTANYL CITRATE 50 MCG: 50 INJECTION, SOLUTION INTRAMUSCULAR; INTRAVENOUS at 17:25

## 2024-01-04 RX ADMIN — HYDROCODONE BITARTRATE AND ACETAMINOPHEN 1 TABLET: 10; 325 TABLET ORAL at 23:51

## 2024-01-04 NOTE — ED PROVIDER NOTES
" EMERGENCY DEPARTMENT ENCOUNTER    Room Number:  I377/1  Date of encounter:  1/4/2024  PCP: Radu Rawls MD  Historian: Patient and daughter  Relevant information and history provided by sources other than the patient will be included below and in the ED Course.  Review of pertinent past medical records may also be included in record below and ED Course.    HPI:  Chief Complaint: \"I am detoxing from fentanyl\"  A complete HPI/ROS/PMH/PSH/SH/FH are unobtainable due to: Not applicable  Context: Meme Mcginnis is a 63 y.o. female who presents to the ED c/o patient has been having a hard time getting her prescription filled from fentanyl.  She does have chronic back and neck pain and bodyaches.  She is on hydrocodone 10 mg scheduled that she takes every 4 hours.  She is also on fentanyl patches chronically.  Unfortunately there is a backlog on the fentanyl.  Her last fentanyl patch was about 8 or 9 days ago.  Patient does not remember the exact date.  About 3 to 4 days after that started with some vomiting and bodyaches and felt like she was withdrawing.  They finally were able to get fentanyl last night and she had fentanyl patch placed on her last night.  The symptoms of vomiting and nausea has persisted.  She has also had loose black stool over the past 2 days.  She has had a total of 3 large loose black bowel movements in the past 3 days.  Really does not complain of much abdominal pain is very mild.  Complains of diffuse body aches.  Complains of weakness.  She was trying to get up out of bed and fell she said that she hit her nose.  She does suffer from chronic neck pain which she thinks is pretty similar.  She is never had a history of GI bleed.  She is not on any blood thinning medicines.  She has not been able to hold down any of her medicines including medicines for pain.  She is on alprazolam, diclofenac, fentanyl patch, hydrocodone and Zanaflex.  Daughter states that she has not had anything to eat " of any substance in the past 8 to 9 days.  She has been tolerating a small amount of ginger ale and liquids but again has been having vomiting.  She denies any urinary or fecal incontinence or retention.  No new numbness or tingling to extremities and no new weakness 20 of her extremities.  She has generalized weakness and decreased energy level      Previous Episodes: She feels like this is from withdrawal from fentanyl.  She reports never having history of GI bleed in the past.  Current Symptoms: See above    MEDICAL HISTORY REVIEWED  In reviewing old records I can see I saw this patient in February 2023.  He had chronic neck pain.  It sounds like she came in and had some withdrawal from fentanyl.  I reviewed the lab work as well as CT scan and she was admitted to [facility] for withdrawal.  She also did have an anion gap.  She does have a history of depression, chronic pain and opiate dependence.  I reviewed her medicine list in [facility].      PAST MEDICAL HISTORY  Active Ambulatory Problems     Diagnosis Date Noted    Constipation 01/31/2022    Abnormal CT scan, colon 01/31/2022    Colitis, collagenous 01/31/2022    Pain of upper abdomen 01/31/2022    Non-intractable vomiting 01/31/2022    Pleuritic chest pain 09/30/2022    DDD (degenerative disc disease), cervical     DDD (degenerative disc disease), lumbar     Depression     Pleurisy     Chronic pain 10/03/2022    Abdominal pain, vomiting, and diarrhea 02/16/2023    Opioid withdrawal 02/17/2023    Opioid dependence 02/17/2023    Anemia 02/17/2023    Hypokalemia 02/17/2023     Resolved Ambulatory Problems     Diagnosis Date Noted    No Resolved Ambulatory Problems     Past Medical History:   Diagnosis Date    Allergic rhinitis     Anxiety     Arthralgia     Chronic neck and back pain     Chronic radicular low back pain     COPD (chronic obstructive pulmonary disease)     Cough     Fatigue     Hyperlipidemia     Insomnia     Long term use of drug     Neck pain      Palpitations     Rheumatoid arthritis          PAST SURGICAL HISTORY  Past Surgical History:   Procedure Laterality Date    COLONOSCOPY           FAMILY HISTORY  Family History   Problem Relation Age of Onset    Atrial fibrillation Mother          SOCIAL HISTORY  Social History     Socioeconomic History    Marital status:    Tobacco Use    Smoking status: Former     Packs/day: 0.25     Years: 35.00     Additional pack years: 0.00     Total pack years: 8.75     Types: Cigarettes     Quit date: 10/16/2022     Years since quittin.2    Smokeless tobacco: Never   Vaping Use    Vaping Use: Some days    Substances: Nicotine   Substance and Sexual Activity    Alcohol use: Not Currently    Drug use: Not Currently    Sexual activity: Defer         ALLERGIES  Patient has no known allergies.        REVIEW OF SYSTEMS  Review of Systems     All systems reviewed and negative except for those discussed in HPI.       PHYSICAL EXAM    I have reviewed the triage vital signs and nursing notes.    ED Triage Vitals [24 1457]   Temp Heart Rate Resp BP SpO2   96.5 °F (35.8 °C) (!) 125 16 122/96 98 %      Temp src Heart Rate Source Patient Position BP Location FiO2 (%)   Tympanic -- -- -- --       GENERAL: This patient looks weak and tired.  She pills pale.  Patient looks critically ill.  She appears much older than her stated age.  Appears chronically malnourished.  I do not see any emesis in her emesis bag that she has at her side.  Vital signs on my initial evaluation she is tachycardic.  Her heart rate rates from 1 10-1 25.  Currently on my exam it is 112 and is sinus tachycardia on the monitor.  Blood pressure is 105 systolic.  O2 sats 100% on room air.  She is afebrile.  HENT: nares patent  Head/neck/ face are symmetric without gross deformity, signs of trauma, or swelling.  I do not appreciate any signs of trauma to her head or face.  Patient reports that she hit her forehead nasal bridge.  EYES: no scleral  icterus, pale conjunctive a  NECK: In palpation of her neck diffusely she has pain.  She chronically has pain in her neck.  CV: regular rhythm, tachycardia with intact distal pulses.  Soft systolic murmur 2 out of 6  RESPIRATORY: normal effort and no respiratory distress.  Clear to auscultation bilaterally  ABDOMEN: soft and nontender.  Thin female.  Very mild subjective tenderness on exam.  There is no guarding or rebound.  Positive bowel sounds.  Rectal exam she has loose black stool that is the appearance of melena and is Heema prompt positive immediately.  MUSCULOSKELETAL: no deformity.  She has paler to her extremities.  There is no edema.  She has intact distal pulses that are equal and symmetric but appears slightly diminished to her distal extremities.  NEURO: alert and appropriate, moves all extremities, follows commands.  Alert and oriented x 3.  She has generalized weakness.  She has no focal motor or sensory changes.  SKIN: warm, dry    Vital signs and nursing notes reviewed.        LAB RESULTS  Recent Results (from the past 24 hour(s))   Comprehensive Metabolic Panel    Collection Time: 01/04/24  3:35 PM    Specimen: Blood   Result Value Ref Range    Glucose 185 (H) 65 - 99 mg/dL    BUN 74 (H) 8 - 23 mg/dL    Creatinine 1.25 (H) 0.57 - 1.00 mg/dL    Sodium 132 (L) 136 - 145 mmol/L    Potassium 2.9 (L) 3.5 - 5.2 mmol/L    Chloride 96 (L) 98 - 107 mmol/L    CO2 16.3 (L) 22.0 - 29.0 mmol/L    Calcium 9.1 8.6 - 10.5 mg/dL    Total Protein 6.8 6.0 - 8.5 g/dL    Albumin 4.6 3.5 - 5.2 g/dL    ALT (SGPT) 23 1 - 33 U/L    AST (SGOT) 14 1 - 32 U/L    Alkaline Phosphatase 44 39 - 117 U/L    Total Bilirubin 0.2 0.0 - 1.2 mg/dL    Globulin 2.2 gm/dL    A/G Ratio 2.1 g/dL    BUN/Creatinine Ratio 59.2 (H) 7.0 - 25.0    Anion Gap 19.7 (H) 5.0 - 15.0 mmol/L    eGFR 49.1 (L) >60.0 mL/min/1.73   Type & Screen    Collection Time: 01/04/24  3:35 PM    Specimen: Blood   Result Value Ref Range    ABO Type A     RH type  Positive     Antibody Screen Negative     T&S Expiration Date 1/7/2024 11:59:59 PM    Green Top (Gel)    Collection Time: 01/04/24  3:35 PM   Result Value Ref Range    Extra Tube Hold for add-ons.    Lavender Top    Collection Time: 01/04/24  3:35 PM   Result Value Ref Range    Extra Tube hold for add-on    Gold Top - SST    Collection Time: 01/04/24  3:35 PM   Result Value Ref Range    Extra Tube Hold for add-ons.    CBC Auto Differential    Collection Time: 01/04/24  3:35 PM    Specimen: Blood   Result Value Ref Range    WBC 13.30 (H) 3.40 - 10.80 10*3/mm3    RBC 3.40 (L) 3.77 - 5.28 10*6/mm3    Hemoglobin 10.7 (L) 12.0 - 15.9 g/dL    Hematocrit 32.3 (L) 34.0 - 46.6 %    MCV 95.0 79.0 - 97.0 fL    MCH 31.5 26.6 - 33.0 pg    MCHC 33.1 31.5 - 35.7 g/dL    RDW 12.9 12.3 - 15.4 %    RDW-SD 44.6 37.0 - 54.0 fl    MPV 9.9 6.0 - 12.0 fL    Platelets 361 140 - 450 10*3/mm3    Neutrophil % 76.4 (H) 42.7 - 76.0 %    Lymphocyte % 16.9 (L) 19.6 - 45.3 %    Monocyte % 5.8 5.0 - 12.0 %    Eosinophil % 0.0 (L) 0.3 - 6.2 %    Basophil % 0.1 0.0 - 1.5 %    Immature Grans % 0.8 (H) 0.0 - 0.5 %    Neutrophils, Absolute 10.16 (H) 1.70 - 7.00 10*3/mm3    Lymphocytes, Absolute 2.25 0.70 - 3.10 10*3/mm3    Monocytes, Absolute 0.77 0.10 - 0.90 10*3/mm3    Eosinophils, Absolute 0.00 0.00 - 0.40 10*3/mm3    Basophils, Absolute 0.01 0.00 - 0.20 10*3/mm3    Immature Grans, Absolute 0.11 (H) 0.00 - 0.05 10*3/mm3    nRBC 0.0 0.0 - 0.2 /100 WBC   Lipase    Collection Time: 01/04/24  3:35 PM    Specimen: Blood   Result Value Ref Range    Lipase 28 13 - 60 U/L   Magnesium    Collection Time: 01/04/24  3:35 PM    Specimen: Blood   Result Value Ref Range    Magnesium 2.8 (H) 1.6 - 2.4 mg/dL   High Sensitivity Troponin T    Collection Time: 01/04/24  3:35 PM    Specimen: Blood   Result Value Ref Range    HS Troponin T 39 (H) <14 ng/L   BNP    Collection Time: 01/04/24  3:35 PM    Specimen: Blood   Result Value Ref Range    proBNP 1,683.0 (H) 0.0 -  900.0 pg/mL   Procalcitonin    Collection Time: 01/04/24  3:35 PM    Specimen: Blood   Result Value Ref Range    Procalcitonin 6.14 (H) 0.00 - 0.25 ng/mL   Lactic Acid, Plasma    Collection Time: 01/04/24  3:36 PM    Specimen: Blood   Result Value Ref Range    Lactate 5.9 (C) 0.5 - 2.0 mmol/L   Protime-INR    Collection Time: 01/04/24  4:23 PM    Specimen: Blood   Result Value Ref Range    Protime 14.9 (H) 11.7 - 14.2 Seconds    INR 1.15 (H) 0.90 - 1.10   aPTT    Collection Time: 01/04/24  4:23 PM    Specimen: Blood   Result Value Ref Range    PTT 21.6 (L) 22.7 - 35.4 seconds   ECG 12 Lead Other; Diffuse body aches, opiate withdrawal, GI bleed    Collection Time: 01/04/24  4:31 PM   Result Value Ref Range    QT Interval 438 ms    QTC Interval 582 ms   STAT Lactic Acid, Reflex    Collection Time: 01/04/24  5:11 PM    Specimen: Blood   Result Value Ref Range    Lactate 3.8 (C) 0.5 - 2.0 mmol/L   High Sensitivity Troponin T 2Hr    Collection Time: 01/04/24  5:11 PM    Specimen: Blood   Result Value Ref Range    HS Troponin T 33 (H) <14 ng/L    Troponin T Delta -6 (L) >=-4 - <+4 ng/L   ABO RH Specimen Verification    Collection Time: 01/04/24  5:15 PM    Specimen: Blood   Result Value Ref Range    ABO Type A     RH type Positive    POC Occult Blood Stool    Collection Time: 01/04/24  5:44 PM    Specimen: Stool   Result Value Ref Range    Fecal Occult Blood Positive (A) Negative    Lot Number [id number]     Expiration Date August 31, 2024     Positive Control Positive Positive    Negative Control Negative Negative   Prepare RBC, 1 Units    Collection Time: 01/04/24  6:45 PM   Result Value Ref Range    Product Code C4523F45     Unit Number [id number]     UNIT  ABO A     UNIT  RH POS     Crossmatch Interpretation Compatible     Dispense Status IS     Blood Expiration Date [id number]     Blood Type Barcode 6200    Urinalysis With Microscopic If Indicated (No Culture) - Urine, Clean Catch    Collection Time: 01/04/24  8:12  PM    Specimen: Urine, Clean Catch   Result Value Ref Range    Color, UA Yellow Yellow, Straw    Appearance, UA Clear Clear    pH, UA 5.5 5.0 - 8.0    Specific Gravity, UA >=1.030 1.005 - 1.030    Glucose, UA Negative Negative    Ketones, UA Negative Negative    Bilirubin, UA Negative Negative    Blood, UA Moderate (2+) (A) Negative    Protein, UA Negative Negative    Leuk Esterase, UA Small (1+) (A) Negative    Nitrite, UA Negative Negative    Urobilinogen, UA 0.2 E.U./dL 0.2 - 1.0 E.U./dL   Urinalysis, Microscopic Only - Urine, Clean Catch    Collection Time: 01/04/24  8:12 PM    Specimen: Urine, Clean Catch   Result Value Ref Range    RBC, UA 3-5 (A) None Seen, 0-2 /HPF    WBC, UA 3-5 (A) None Seen, 0-2 /HPF    Bacteria, UA None Seen None Seen /HPF    Squamous Epithelial Cells, UA 0-2 None Seen, 0-2 /HPF    Hyaline Casts, UA 0-2 None Seen /LPF    Methodology Automated Microscopy    STAT Lactic Acid, Reflex    Collection Time: 01/04/24  8:17 PM    Specimen: Blood   Result Value Ref Range    Lactate 2.7 (C) 0.5 - 2.0 mmol/L   POC Glucose Once    Collection Time: 01/04/24 11:22 PM    Specimen: Blood   Result Value Ref Range    Glucose 108 70 - 130 mg/dL       Ordered the above labs and independently reviewed the results.        RADIOLOGY  CT Head Without Contrast, CT Cervical Spine Without Contrast    Result Date: 1/4/2024  CT HEAD AND CERVICAL SPINE WITHOUT CONTRAST  HISTORY: Fall, hit head, headache, neck pain, syncope.  COMPARISON: None.  CT HEAD WITHOUT CONTRAST: The brain and ventricles are symmetrical. There is no evidence of hemorrhage, hydrocephalus or of abnormal extra-axial fluid. No focal area of decreased attenuation to suggest acute infarction is identified. Mild vascular calcification is noted. Bone windows show no evidence of a calvarial fracture.      There is no evidence of fracture or of intracranial hemorrhage. Further evaluation could be performed with MRI examination of the brain as indicated.   CT CERVICAL SPINE WITHOUT CONTRAST: There is moderate loss of disc height at C5-C6. There is no evidence of prevertebral edema or a fracture.  C2-C3: A small central disc bulge is present.  C3-C4: A small central disc bulge is present. Mild facet degenerative disease is present on the right.  C4-C5: A mild central disc bulge is present. Mild-to-moderate facet degenerative disease is present on the right.  C5-C6: A mild central disc osteophyte complex is present with no evidence of herniation.  C6-C7: A small central disc bulge is present with no evidence of herniation.  C7-T1: There is no evidence of disc bulge or herniation.  IMPRESSION: Multilevel degenerative disease involving the cervical spine is noted as described above. There is no evidence of fracture. See above.    Radiation dose reduction techniques were utilized, including automated exposure control and exposure modulation based on body size.   This report was finalized on 1/4/2024 7:41 PM by Dr. Morteza Scruggs M.D on Workstation: [id number]      CT Angiogram Abdomen Pelvis    Result Date: 1/4/2024  CT ANGIOGRAM ABDOMEN PELVIS WITH AND WITHOUT CONTRAST  HISTORY: GI bleed.  TECHNIQUE: CT angiogram includes axial imaging through the abdomen and pelvis without intravenous contrast followed by intravenous contrast administration and imaging in the arterial and venous phases. Three-dimensional volume rendering was performed.  COMPARISON: CT abdomen and pelvis 02/16/2023.  FINDINGS: There is mild mural thrombus along the left lateral wall of the proximal SMA with mild-to-moderate narrowing. Mild-to-moderate narrowing is present in the origin of celiac artery. Both main renal arteries are patent. There is atherosclerotic disease involving the abdominal aorta without aneurysm formation. Inferior mesenteric artery is patent. Atherosclerotic calcifications are present involving both common iliac arteries and internal and external iliac arteries with mild narrowing.  There is mild narrowing of both common femoral arteries. Both proximal superficial and deep femoral arteries are patent.  Lung bases appear clear. Liver, spleen, adrenal glands, pancreas, kidneys appear within normal limits. There is no hydronephrosis.  There is generalized, diffuse colonic wall thickening extending from the cecum to the rectum consistent with diffuse colitis. There is no evidence for bowel obstruction. There is no ascites or evidence for intra-abdominal abscess formation.      1. Diffuse colitis extending from the cecum to the rectum. 2. Atherosclerotic disease. Mild-to-moderate narrowing of the proximal celiac and superior mesenteric arteries. Multifocal mild iliac stenoses. No evidence for aneurysm.  Radiation dose reduction techniques were utilized, including automated exposure control and exposure modulation based on body size.   This report was finalized on 1/4/2024 7:38 PM by Dr. Lionel Rao M.D on Workstation: [id number]      XR Chest 1 View    Result Date: 1/4/2024  CHEST SINGLE VIEW  HISTORY: Vomiting. GI bleed  COMPARISON: AP chest 09/30/2022.  FINDINGS: Cardiomediastinal silhouette is normal. Lungs are clear and there is no evidence for pulmonary edema or pleural effusion or infiltrate. There are calcified mediastinal lymph nodes.      No evidence for active disease in the chest.  This report was finalized on 1/4/2024 5:06 PM by Dr. Lionel Rao M.D on Workstation: [id number]       I ordered the above noted radiological studies. Reviewed by me and discussed with radiologist.  See dictation for official radiology interpretation.      PROCEDURES    Critical Care    Performed by: Polo Huang MD  Authorized by: Polo Huang MD    Critical care provider statement:     Critical care time (minutes): 60.    Critical care time was exclusive of:  Separately billable procedures and treating other patients    Critical care was necessary to treat or prevent imminent or life-threatening  deterioration of the following conditions:  Renal failure, circulatory failure and sepsis    Critical care was time spent personally by me on the following activities:  Blood draw for specimens, development of treatment plan with patient or surrogate, discussions with consultants, examination of patient, obtaining history from patient or surrogate, pulse oximetry, re-evaluation of patient's condition, review of old charts, ordering and review of radiographic studies, ordering and review of laboratory studies and ordering and performing treatments and interventions    I assumed direction of critical care for this patient from another provider in my specialty: no      Care discussed with: admitting provider          MEDICATIONS GIVEN IN ER    Medications   sodium chloride 0.9 % flush 10 mL (has no administration in time range)   pantoprazole (PROTONIX) 40 mg in 100 mL NS (VTB) (8 mg/hr Intravenous Currently Infusing 1/4/24 1726)   Potassium Replacement - Follow Nurse / BPA Driven Protocol (has no administration in time range)   ALPRAZolam (XANAX) tablet 1 mg (has no administration in time range)   citalopram (CeleXA) tablet 40 mg (has no administration in time range)   fentaNYL (DURAGESIC) 100 MCG/HR patch 1 patch (has no administration in time range)     And   Check Fentanyl Patch Placement (has no administration in time range)   HYDROcodone-acetaminophen (NORCO)  MG per tablet 1 tablet (has no administration in time range)   tiZANidine (ZANAFLEX) tablet 2 mg (has no administration in time range)   ondansetron (ZOFRAN) injection 4 mg (has no administration in time range)   sodium chloride 0.9 % infusion (has no administration in time range)   Potassium Replacement - Follow Nurse / BPA Driven Protocol (has no administration in time range)   Magnesium Standard Dose Replacement - Follow Nurse / BPA Driven Protocol (has no administration in time range)   Phosphorus Replacement - Follow Nurse / BPA Driven Protocol  (has no administration in time range)   Calcium Replacement - Follow Nurse / BPA Driven Protocol (has no administration in time range)   cefTRIAXone (ROCEPHIN) 1,000 mg in sodium chloride 0.9 % 100 mL IVPB-VTB (has no administration in time range)   methylPREDNISolone sodium succinate (SOLU-Medrol) injection 40 mg (has no administration in time range)   metroNIDAZOLE (FLAGYL) IVPB 500 mg (has no administration in time range)   sodium chloride 0.9 % bolus 1,000 mL (0 mL Intravenous Stopped 1/4/24 1630)   pantoprazole (PROTONIX) injection 80 mg (80 mg Intravenous Given 1/4/24 1626)   cefTRIAXone (ROCEPHIN) 2,000 mg in sodium chloride 0.9 % 100 mL IVPB-VTB (0 mg Intravenous Stopped 1/4/24 1812)   sodium chloride 0.9 % bolus 1,752 mL (0 mL Intravenous Stopped 1/4/24 1828)   fentaNYL citrate (PF) (SUBLIMAZE) injection 50 mcg (50 mcg Intravenous Given 1/4/24 1725)   ondansetron (ZOFRAN) injection 4 mg (4 mg Intravenous Given 1/4/24 1725)   iopamidol (ISOVUE-370) 76 % injection 100 mL (95 mL Intravenous Given by Other 1/4/24 1807)         All labs have been independently reviewed by me.  All radiology studies have been reviewed by me and I discussed with radiologist dictating the report when indicated below.  All EKG's independently viewed and interpreted by me.  Discussion below represents my analysis of pertinent findings related to patient's condition, differential diagnosis, treatment plan and final disposition.        PROGRESS, DATA ANALYSIS, CONSULTS, AND MEDICAL DECISION MAKING    This is seriously ill patient.  I believe she is suffering from GI blood loss as well as from dehydration contributing to her symptoms.  She currently is on a fentanyl patch.  Not thinking the main component here is opioid withdrawal.  She is not hypertensive.  I will start with a fluid bolus I have ordered 1 unit of transfusion of blood.  She has an acute elevation in BUN and creatinine likely confirm contributing factors from the GI  bleed as well as from dehydration.  Her lactic is 5.9.  I do not anticipate an infectious etiology.  Although when I go back and ask her if she feels that she might of had some low-grade fevers.  I will give her a dose of Rocephin just in case and do cultures.      ED Course as of 01/04/24 2339   Thu Jan 04, 2024   1619 10 months ago on the last hemoglobin is 8.4.  The 1 prior to that was 9.8 and the one prior to that was 14. [MM]   1619 Lactate(!!): 5.9 [MM]   1620 BUN(!): 74 [MM]   1620 Creatinine(!): 1.25 [MM]   1620 Potassium(!): 2.9 [MM]   1620 Anion Gap(!): 19.7 [MM]   1621 Acute elevation in BUN and creatinine.  I am concerned that this is from a GI bleed as a contributing factor.  She is also not been eating or drinking much as well so I bet there is some volume contraction from lack of p.o. intake.      I will go ahead and order 1 unit of transfusion of blood.  Patient has active melena.  Blood pressure low 100 systolic and patient is tachycardic [MM]   1636 My own independent interpretation of the EKG that was done at 4:31 PM reveals a rate of 106  Narrow complex normal axis there is signs of right axis deviation  Left atrial enlargement  I do not see any definitive acute injury pattern there is diffuse nonspecific ST and T wave changes  QT looks also prolonged.  When I look at her previous EKG on February 16, 2023 there are some nonspecific changes. [MM]   1637 Magnesium(!): 2.8 [MM]   1658 Procalcitonin(!): 6.14  Antibiotics have been ordered.  Uncertain if this is definitively from an infectious etiology. [MM]   1659 HS Troponin T(!): 39  Elevated from baseline very likely related to her acute medical condition.  I am not seeing any definitive acute injury pattern on the EKG and she really does not have chest pain.  Complains of diffuse body wide pain.  Continue to monitor her troponin. [MM]   1700 My own independent interpretation of the chest x-ray is unremarkable.  I do not see any focal pulmonary  consolidation lungs appear clear I do not see pneumothorax do not see any signs of congestive heart failure or pulmonary edema.  I did review the radiologist report.    . [MM]   1705 I have reassessed the patient.  Blood pressures has improved to the 120s systolic.  Heart rate is still tachycardic about 110.  O2 sats 100% on room air.  I talked at length with the patient and daughter at bedside.  Patient is requesting medicine for pain.  Daughter is also requesting the patient get medicine for pain.  She is requesting IV pain medicine.  Will give her small dose of fentanyl.  Again I informed him of the serious critical condition initial treatment plan and results of test.  All questions answered [MM]   1745 I did discuss the case at length with the admitting physician Dr. Bolanos for the intensive care unit.  Informed him of the patient's presenting symptoms results of tests and my initial treatment plan.  He agrees to accept her.  The CT scans have not been completed as of yet.  We have not sent her to the ICU until the CT scans are complete.  [MM]   1910 Troponin T Delta(!): -6 [MM]   1910 HS Troponin T(!): 33 [MM]   1912 Reviewed the CT scan of the head and CT of the cervical spine.  There is no evidence of any acute hemorrhage in the brain or any acute abnormality malady.  CT scan of the cervical spine showed no acute fracture or dislocation.  There are some arthritic changes.  Please see complete dictated report from radiologist. [MM]   1913 I reviewed the CT scan report of the abdomen pelvis.  Patient has diffuse colitis from the cecum to the rectum there is mild to moderate narrowing of the proximal celiac and superior mesenteric arteries and there is atherosclerotic disease.  Please see complete dictated report from radiologist [MM]   1913 I have reevaluated the patient.  Blood pressure is much improved.  It is up to the 140 systolic.  Heart rate is in the low 100s.  She overall is looking better.  Does not  look as pale as initially.  I informed her and her daughter the results of the CT scans.  All questions answered. [MM]      ED Course User Index  [MM] Polo Huang MD       AS OF 23:39 EST VITALS:    BP - 144/75  HR - 98  TEMP - 97.6 °F (36.4 °C) (Oral)  02 SATS - 96%    SOCIAL DETERMINANTS OF HEALTH THAT IMPACT OR LIMIT CARE (For example..Homelessness,safe discharge, inability to obtain care, follow up, or prescriptions):      DIAGNOSIS  Final diagnoses:   Acute upper GI bleed   Acute renal failure, unspecified acute renal failure type   Lactic acid increased   Elevated procalcitonin   Troponin level elevated   Other chronic pain   Colitis         DISPOSITION  Patient admitted to the intensive care unit.          DICTATED UTILIZING DRAGON DICTATION    Note Disclaimer: At [facility], we believe that sharing information builds trust and better relationships. You are receiving this note because you recently visited [facility]. It is possible you will see health information before a provider has talked with you about it. This kind of information can be easy to misunderstand. To help you fully understand what it means for your health, we urge you to discuss this note with your provider.       Polo Huang MD  01/04/24 3945

## 2024-01-04 NOTE — ED NOTES
Spoke with patient's daughter who reports that the patient has also had black or bloody stool x 2 days. Patient is not on a blood thinner.

## 2024-01-04 NOTE — H&P
Patient Care Team:  Radu Rawls MD as PCP - General  Radu Rawls MD as PCP - Family Medicine    Chief complaint:  Nausea and vomiting    History of present illness:  Subjective   This is a 63-year-old female patient with history of Chronic pain low back pain and RA.    She is on Hydrocodone 10 mg 5 times daily and fentanyl patches 50 mcg every 72 hours.  She ran out of her patches on 12/28.  On 12/30, she started experiencing nausea and vomiting.  She has not been able to keep her pills down lately.  Over the last 3 days, she started experiencing black watery diarrhea.    She did experience a loose watery black diarrhea in the ED and tested Hemoccult positive.    In ED, BP was normal. HR max was 126/min.     She received Protonix.     Labs: BUN 75; Cr 1.2; Lactic 5.9; Procal 6; WBC 13 K; Hb 10 (last was 8).     Review of Systems:  Constitutional: No fever or chills.   ENMT: No sinus congestion  Cardiovascular: No chest pain, palpitation or legs swelling.    Respiratory: No dyspnea, cough or wheezing.  Gastrointestinal: See above  Neurology: No headache, weakness, numbness or dizziness.   Musculoskeletal: Chronic low back pain  Psychiatry: No depression.  Genitourinary: No dysuria or frequent urination  Endo: No weight changes. No cold or warm intolerance.  Lymphatic: No swollen glands.  Integumentary: No rash.    History  Past Medical History:   Diagnosis Date    Allergic rhinitis     Anxiety     Arthralgia     Chronic neck and back pain     Chronic radicular low back pain     COPD (chronic obstructive pulmonary disease)     Cough     DDD (degenerative disc disease), cervical     DDD (degenerative disc disease), lumbar     Depression     Fatigue     Hyperlipidemia     Insomnia     Long term use of drug     Neck pain     Palpitations     Rheumatoid arthritis      Past Surgical History:   Procedure Laterality Date    COLONOSCOPY  1998     Family History   Problem Relation Age of  Onset    Atrial fibrillation Mother      Social History     Tobacco Use    Smoking status: Former     Packs/day: 0.25     Years: 35.00     Additional pack years: 0.00     Total pack years: 8.75     Types: Cigarettes     Quit date: 10/16/2022     Years since quittin.2    Smokeless tobacco: Never   Vaping Use    Vaping Use: Some days    Substances: Nicotine   Substance Use Topics    Alcohol use: Not Currently    Drug use: Not Currently     E-cigarette/Vaping    E-cigarette/Vaping Use Current Some Day User     Comments every once in a while      E-cigarette/Vaping Substances    Nicotine Yes      (Not in a hospital admission)   Allergies:  Patient has no known allergies.    Objective   Vital Signs  Temp:  [96.5 °F (35.8 °C)] 96.5 °F (35.8 °C)  Heart Rate:  [125] 125  Resp:  [16] 16  BP: (104-122)/(74-96) 104/74    PPE used per hospital policy    Physical Exam:  Constitutional: Not in acute distress.  Pale looking.  Eyes: Pale conjunctiva, EOMI. Pupils equal and reactive to light.   ENMT: Edentulous.  Dry tongue.  Neck: No thyromegaly.  Trachea midline  Heart: RRR, no murmur.  No pitting edema  Lungs: Good and equal air entry bilaterally.  Nonlabored breathing  Abdomen: Mild tenderness on palpation of the epigastric region.  Soft..  Positive bowel sounds  Extremities: No cyanosis, clubbing. Moves all extremities.  Warm extremities and well-perfused  Neuro: Conscious, alert, oriented x3.  Strength 5/5 overall  Psych: Appropriate mood and affect.    Integumentary: No rash or ecchymosis  Lymphatic: No palpable cervical or supraclavicular lymph nodes.            Diagnostic imaging:  I personally and independently reviewed the following images:   CXR 23: Clear        Laboratory workup:  Results from last 7 days   Lab Units 24  1535   SODIUM mmol/L 132*   POTASSIUM mmol/L 2.9*   CHLORIDE mmol/L 96*   CO2 mmol/L 16.3*   BUN mg/dL 74*   CREATININE mg/dL 1.25*   GLUCOSE mg/dL 185*   CALCIUM mg/dL 9.1     Results  from last 7 days   Lab Units 01/04/24  1535   HSTROP T ng/L 39*     Results from last 7 days   Lab Units 01/04/24  1535   WBC 10*3/mm3 13.30*   HEMOGLOBIN g/dL 10.7*   HEMATOCRIT % 32.3*   PLATELETS 10*3/mm3 361     Results from last 7 days   Lab Units 01/04/24  1623   INR  1.15*   APTT seconds 21.6*     Results from last 7 days   Lab Units 01/04/24  1535   PROBNP pg/mL 1,683.0*       Assessment     GI bleed, probably upper  Opiate withdrawal  Electrolytes disturbance: Severe hypokalemia.  Mild hyponatremia, clinically significant  Dehydration, nausea and vomiting  Possible sepsis of unknown origin.  Source of infection not identified yet.  EYAL, mild secondary to dehydration  Lactic acidosis secondary to hypoperfusion from bleeding/dehydration and possibly from sepsis  Chronic anemia but also suspect acute component as patient appears to be pale    History of microscopic colitis, in remission  Collagenous colitis  Chronic low back pain  RA  COPD, no current exacerbation        Plan:    3 mL/KG IV fluid bolus with 2.7 L.  Maintenance IV fluid afterward.  Empiric antibiotics with Rocephin awaiting cultures  PPI drip.  H&H every 6 hours.  Consult GI, she has seen Dr. Gipson in the past.  Check lactic acid every 6 hours  Replace potassium  N.p.o. for now  Zofran as needed for nausea/vomiting  Resumed her home pain medication.  Fentanyl patch 50 mcg every 72 hours to be started tomorrow          Robby Bolanos MD  01/04/24  17:37 EST    Time: Critical care 40 min      This note was dictated utilizing Dragon dictation

## 2024-01-04 NOTE — ED TRIAGE NOTES
Patient to ED via EMS from home. Patient has been having trouble getting fentanyl patches refilled and has been having withdrawal symptoms. Patient reports N/V/D. Patient is tachycardic and hyperglycemic per EMS.     Patient has chronic neck and back pain per EMS.

## 2024-01-05 LAB
ALBUMIN SERPL-MCNC: 3.5 G/DL (ref 3.5–5.2)
ALBUMIN/GLOB SERPL: 2.5 G/DL
ALP SERPL-CCNC: 31 U/L (ref 39–117)
ALT SERPL W P-5'-P-CCNC: 17 U/L (ref 1–33)
ANION GAP SERPL CALCULATED.3IONS-SCNC: 10 MMOL/L (ref 5–15)
AST SERPL-CCNC: 16 U/L (ref 1–32)
BH BB BLOOD EXPIRATION DATE: NORMAL
BH BB BLOOD TYPE BARCODE: 6200
BH BB DISPENSE STATUS: NORMAL
BH BB PRODUCT CODE: NORMAL
BH BB UNIT NUMBER: NORMAL
BILIRUB SERPL-MCNC: 0.2 MG/DL (ref 0–1.2)
BUN SERPL-MCNC: 31 MG/DL (ref 8–23)
BUN/CREAT SERPL: 42.5 (ref 7–25)
CALCIUM SPEC-SCNC: 7.8 MG/DL (ref 8.6–10.5)
CHLORIDE SERPL-SCNC: 109 MMOL/L (ref 98–107)
CO2 SERPL-SCNC: 19 MMOL/L (ref 22–29)
CREAT SERPL-MCNC: 0.73 MG/DL (ref 0.57–1)
CROSSMATCH INTERPRETATION: NORMAL
D-LACTATE SERPL-SCNC: 1.2 MMOL/L (ref 0.5–2)
EGFRCR SERPLBLD CKD-EPI 2021: 93.7 ML/MIN/1.73
GLOBULIN UR ELPH-MCNC: 1.4 GM/DL
GLUCOSE BLDC GLUCOMTR-MCNC: 137 MG/DL (ref 70–130)
GLUCOSE SERPL-MCNC: 125 MG/DL (ref 65–99)
HCT VFR BLD AUTO: 19.6 % (ref 34–46.6)
HCT VFR BLD AUTO: 22.4 % (ref 34–46.6)
HCT VFR BLD AUTO: 25.3 % (ref 34–46.6)
HGB BLD-MCNC: 6.5 G/DL (ref 12–15.9)
HGB BLD-MCNC: 7.2 G/DL (ref 12–15.9)
HGB BLD-MCNC: 8.5 G/DL (ref 12–15.9)
POTASSIUM SERPL-SCNC: 3.1 MMOL/L (ref 3.5–5.2)
POTASSIUM SERPL-SCNC: 4.6 MMOL/L (ref 3.5–5.2)
PROT SERPL-MCNC: 4.9 G/DL (ref 6–8.5)
SODIUM SERPL-SCNC: 138 MMOL/L (ref 136–145)
UNIT  ABO: NORMAL
UNIT  RH: NORMAL

## 2024-01-05 PROCEDURE — 80053 COMPREHEN METABOLIC PANEL: CPT | Performed by: INTERNAL MEDICINE

## 2024-01-05 PROCEDURE — 25010000002 METRONIDAZOLE 500 MG/100ML SOLUTION: Performed by: INTERNAL MEDICINE

## 2024-01-05 PROCEDURE — 25010000002 CEFTRIAXONE PER 250 MG: Performed by: INTERNAL MEDICINE

## 2024-01-05 PROCEDURE — 25810000003 SODIUM CHLORIDE 0.9 % SOLUTION: Performed by: INTERNAL MEDICINE

## 2024-01-05 PROCEDURE — 85018 HEMOGLOBIN: CPT

## 2024-01-05 PROCEDURE — 25010000002 METHYLPREDNISOLONE PER 40 MG: Performed by: INTERNAL MEDICINE

## 2024-01-05 PROCEDURE — 84132 ASSAY OF SERUM POTASSIUM: CPT | Performed by: INTERNAL MEDICINE

## 2024-01-05 PROCEDURE — 85018 HEMOGLOBIN: CPT | Performed by: INTERNAL MEDICINE

## 2024-01-05 PROCEDURE — 99222 1ST HOSP IP/OBS MODERATE 55: CPT | Performed by: INTERNAL MEDICINE

## 2024-01-05 PROCEDURE — 85014 HEMATOCRIT: CPT | Performed by: INTERNAL MEDICINE

## 2024-01-05 PROCEDURE — 85014 HEMATOCRIT: CPT

## 2024-01-05 PROCEDURE — 82948 REAGENT STRIP/BLOOD GLUCOSE: CPT

## 2024-01-05 PROCEDURE — 25010000002 ONDANSETRON PER 1 MG: Performed by: INTERNAL MEDICINE

## 2024-01-05 RX ORDER — POTASSIUM CHLORIDE 750 MG/1
40 TABLET, FILM COATED, EXTENDED RELEASE ORAL EVERY 4 HOURS
Status: COMPLETED | OUTPATIENT
Start: 2024-01-05 | End: 2024-01-05

## 2024-01-05 RX ORDER — DIPHENOXYLATE HYDROCHLORIDE AND ATROPINE SULFATE 2.5; .025 MG/1; MG/1
1 TABLET ORAL DAILY
COMMUNITY

## 2024-01-05 RX ORDER — FENTANYL 50 UG/1
1 PATCH TRANSDERMAL
Status: DISCONTINUED | OUTPATIENT
Start: 2024-01-05 | End: 2024-01-09

## 2024-01-05 RX ORDER — ALPRAZOLAM 0.5 MG/1
0.5 TABLET ORAL 2 TIMES DAILY
COMMUNITY

## 2024-01-05 RX ORDER — CITALOPRAM 40 MG/1
40 TABLET ORAL EVERY EVENING
Status: DISCONTINUED | OUTPATIENT
Start: 2024-01-05 | End: 2024-01-10 | Stop reason: HOSPADM

## 2024-01-05 RX ORDER — TIZANIDINE 4 MG/1
4 TABLET ORAL EVERY 6 HOURS PRN
COMMUNITY

## 2024-01-05 RX ORDER — FENTANYL 50 UG/1
1 PATCH TRANSDERMAL
COMMUNITY
Start: 2024-01-03

## 2024-01-05 RX ORDER — ALPRAZOLAM 0.5 MG/1
0.5 TABLET ORAL 2 TIMES DAILY
Status: DISCONTINUED | OUTPATIENT
Start: 2024-01-05 | End: 2024-01-10 | Stop reason: HOSPADM

## 2024-01-05 RX ADMIN — SODIUM CHLORIDE 8 MG/HR: 9 INJECTION, SOLUTION INTRAVENOUS at 14:43

## 2024-01-05 RX ADMIN — ALPRAZOLAM 1 MG: 0.5 TABLET ORAL at 01:13

## 2024-01-05 RX ADMIN — POTASSIUM CHLORIDE 40 MEQ: 750 TABLET, EXTENDED RELEASE ORAL at 05:28

## 2024-01-05 RX ADMIN — METHYLPREDNISOLONE SODIUM SUCCINATE 40 MG: 40 INJECTION INTRAMUSCULAR; INTRAVENOUS at 04:19

## 2024-01-05 RX ADMIN — CITALOPRAM 40 MG: 40 TABLET, FILM COATED ORAL at 01:13

## 2024-01-05 RX ADMIN — TIZANIDINE 2 MG: 4 TABLET ORAL at 10:40

## 2024-01-05 RX ADMIN — CITALOPRAM 40 MG: 40 TABLET, FILM COATED ORAL at 20:35

## 2024-01-05 RX ADMIN — HYDROCODONE BITARTRATE AND ACETAMINOPHEN 1 TABLET: 10; 325 TABLET ORAL at 09:13

## 2024-01-05 RX ADMIN — CEFTRIAXONE 2000 MG: 2 INJECTION, POWDER, FOR SOLUTION INTRAMUSCULAR; INTRAVENOUS at 16:13

## 2024-01-05 RX ADMIN — METRONIDAZOLE 500 MG: 500 INJECTION, SOLUTION INTRAVENOUS at 12:22

## 2024-01-05 RX ADMIN — TIZANIDINE 2 MG: 4 TABLET ORAL at 18:00

## 2024-01-05 RX ADMIN — METRONIDAZOLE 500 MG: 500 INJECTION, SOLUTION INTRAVENOUS at 20:36

## 2024-01-05 RX ADMIN — SODIUM CHLORIDE 8 MG/HR: 9 INJECTION, SOLUTION INTRAVENOUS at 20:15

## 2024-01-05 RX ADMIN — SODIUM CHLORIDE 100 ML/HR: 9 INJECTION, SOLUTION INTRAVENOUS at 10:39

## 2024-01-05 RX ADMIN — FENTANYL 1 PATCH: 100 PATCH TRANSDERMAL at 08:58

## 2024-01-05 RX ADMIN — POTASSIUM CHLORIDE 40 MEQ: 750 TABLET, EXTENDED RELEASE ORAL at 14:45

## 2024-01-05 RX ADMIN — ONDANSETRON HYDROCHLORIDE 4 MG: 2 INJECTION, SOLUTION INTRAMUSCULAR; INTRAVENOUS at 09:00

## 2024-01-05 RX ADMIN — HYDROCODONE BITARTRATE AND ACETAMINOPHEN 1 TABLET: 10; 325 TABLET ORAL at 16:12

## 2024-01-05 RX ADMIN — METHYLPREDNISOLONE SODIUM SUCCINATE 40 MG: 40 INJECTION INTRAMUSCULAR; INTRAVENOUS at 20:35

## 2024-01-05 RX ADMIN — METHYLPREDNISOLONE SODIUM SUCCINATE 40 MG: 40 INJECTION INTRAMUSCULAR; INTRAVENOUS at 12:22

## 2024-01-05 RX ADMIN — METRONIDAZOLE 500 MG: 500 INJECTION, SOLUTION INTRAVENOUS at 04:19

## 2024-01-05 RX ADMIN — POTASSIUM CHLORIDE 40 MEQ: 750 TABLET, EXTENDED RELEASE ORAL at 09:01

## 2024-01-05 RX ADMIN — SODIUM CHLORIDE 8 MG/HR: 9 INJECTION, SOLUTION INTRAVENOUS at 09:01

## 2024-01-05 RX ADMIN — ALPRAZOLAM 0.5 MG: 0.5 TABLET ORAL at 20:36

## 2024-01-05 RX ADMIN — FENTANYL 1 PATCH: 50 PATCH, EXTENDED RELEASE TRANSDERMAL at 10:39

## 2024-01-05 RX ADMIN — SODIUM CHLORIDE 8 MG/HR: 9 INJECTION, SOLUTION INTRAVENOUS at 05:27

## 2024-01-05 NOTE — PROGRESS NOTES
Dr. SHERIN Villegas    [facility] INTENSIVE CARE        Patient ID:  Name:  Meme Mcginnis  MRN:  [id number]  1960  63 y.o.  female            CC/Reason for visit: Abdominal pain, diarrhea, colitis    Interval hx: Patient admitted to ICU yesterday for low blood pressure, blood in stool, abdominal pain.  She has also been withdrawing from opiates since December 28 when she could not feel her regular fentanyl patch and oral opiates.  She has been having nausea vomiting for the past 2 to 3 days.  Unable to keep any food or liquid down.  She has a history of diarrhea, microscopic colitis.  She is now having a flareup of diarrhea and abdominal pain, difficult to discern between opioid withdrawal or actual reactivation of her colitis.  Based on Hemoccult positive in the emergency room, this is likely colitis exacerbation.      ROS: Positive for nausea, vomiting, abdominal pain, diarrhea, weakness    Vitals:  Vitals:    01/05/24 0400 01/05/24 0500 01/05/24 0600 01/05/24 0730   BP: 121/64 125/64 122/65    BP Location: Right arm      Patient Position: Lying      Pulse: 97 89 92    Resp: 17      Temp: 99.8 °F (37.7 °C)   98.8 °F (37.1 °C)   TempSrc: Oral   Oral   SpO2: 96% 96% 97%    Weight:               Body mass index is 20.73 kg/m².    Intake/Output Summary (Last 24 hours) at 1/5/2024 0950  Last data filed at 1/5/2024 0600  Gross per 24 hour   Intake 2581 ml   Output --   Net 2581 ml       Exam:  GEN:  No distress  Alert, oriented x 3.  Fluent speech  Moves all 4 extremities without focal deficits  LUNGS: Clear breath sounds bilat, no use of accessory muscles  CV:  Normal S1S2, without murmur, no edema  ABD:  Nondistended, some mild diffuse tenderness but no rebound or guarding,, no enlarged liver or masses      Scheduled meds:  ALPRAZolam, 0.5 mg, Oral, BID  cefTRIAXone, 2,000 mg, Intravenous, Q24H  fentaNYL, 1 patch, Transdermal, Q48H   And  [START ON 1/6/2024] Check Fentanyl Patch Placement, 1 each, Does  not apply, Q12H  citalopram, 40 mg, Oral, Q PM  methylPREDNISolone sodium succinate, 40 mg, Intravenous, Q8H  metroNIDAZOLE, 500 mg, Intravenous, Q8H  potassium chloride ER, 40 mEq, Oral, Q4H      IV meds:                      pantoprazole, 8 mg/hr, Last Rate: 8 mg/hr (01/05/24 0901)  sodium chloride, 100 mL/hr, Last Rate: 100 mL/hr (01/04/24 2352)        Data Review:   I reviewed the patient's medications and new clinical results.    COVID19   Date Value Ref Range Status   09/30/2022 Not Detected Not Detected - Ref. Range Final         Lab Results   Component Value Date    CALCIUM 7.8 (L) 01/05/2024    MG 2.8 (H) 01/04/2024    MG 1.9 02/16/2023     Results from last 7 days   Lab Units 01/05/24  0416 01/04/24  2330 01/04/24  1623 01/04/24  1535   SODIUM mmol/L 138  --   --  132*   POTASSIUM mmol/L 3.1*  --   --  2.9*   CHLORIDE mmol/L 109*  --   --  96*   CO2 mmol/L 19.0*  --   --  16.3*   BUN mg/dL 31*  --   --  74*   CREATININE mg/dL 0.73  --   --  1.25*   CALCIUM mg/dL 7.8*  --   --  9.1   BILIRUBIN mg/dL 0.2  --   --  0.2   ALK PHOS U/L 31*  --   --  44   ALT (SGPT) U/L 17  --   --  23   AST (SGOT) U/L 16  --   --  14   GLUCOSE mg/dL 125*  --   --  185*   WBC 10*3/mm3  --  11.95*  --  13.30*   HEMOGLOBIN g/dL 8.5* 8.4*  --  10.7*   PLATELETS 10*3/mm3  --  279  --  361   INR   --   --  1.15*  --    PROBNP pg/mL  --   --   --  1,683.0*   PROCALCITONIN ng/mL  --   --   --  6.14*             Results from last 7 days   Lab Units 01/04/24  1711 01/04/24  1535   HSTROP T ng/L 33* 39*         ASSESSMENT:     Acute upper GI bleed  Hypokalemia  Hyponatremia  Microscopic colitis exacerbation  Diarrhea, nausea vomiting  Probable sepsis  Acute kidney injury  Lactic acidosis  Dehydration  Chronic anemia  Chronic opioid dependence        PLAN:  Patient and all problems new to me.  Continue IV fluids.  Creatinine improved significantly after IV hydration.  GI has seen the patient.  Currently on steroids for possible microscopic  colitis exacerbation.  Resume home dose fentanyl patch.  Hold off on oral opiates for now.  Give antiemetics IV for nausea vomiting.  Monitor hemoglobin.  IV steroid therapy duration to be decided by GI.  Continue empiric Flagyl and Rocephin for now.  Patient presented with procalcitonin of 6.1 and leukocytosis.  Unclear whether she actually has bacterial sepsis, waiting for blood culture results.  This patient has several chronic medical conditions, and now several acute medical illnesses.  Extensive amount of data was reviewed.  Images were directly visualized by me.  This patient warrants high complexity medical decision-making.        Bebeto Villegas MD  1/5/2024

## 2024-01-05 NOTE — CONSULTS
[facility]  Initial Inpatient Consult Note    Referring Provider: Nelly    Reason for Consultation: Diarrhea, rectal bleeding, black stool abnormal imaging    Subjective     History of present illness:    63 y.o. female with GI past medical history significant with collagenous colitis diagnosed 2 years ago via colonoscopy, admitted with black watery diarrhea red streaked stools abdominal pain in the lower quadrants.  Does not radiate worse with movement better at rest.  No nausea or vomiting.  No extraintestinal manifestations of inflammatory bowel disease.    Past Medical History:  Past Medical History:   Diagnosis Date    Allergic rhinitis     Anxiety     Arthralgia     Chronic neck and back pain     Chronic radicular low back pain     COPD (chronic obstructive pulmonary disease)     Cough     DDD (degenerative disc disease), cervical     DDD (degenerative disc disease), lumbar     Depression     Fatigue     Hyperlipidemia     Insomnia     Long term use of drug     Neck pain     Palpitations     Rheumatoid arthritis      Past Surgical History:  Past Surgical History:   Procedure Laterality Date    COLONOSCOPY        Social History:   Social History     Tobacco Use    Smoking status: Former     Packs/day: 0.25     Years: 35.00     Additional pack years: 0.00     Total pack years: 8.75     Types: Cigarettes     Quit date: 10/16/2022     Years since quittin.2    Smokeless tobacco: Never   Substance Use Topics    Alcohol use: Not Currently      Family History:  Family History   Problem Relation Age of Onset    Atrial fibrillation Mother        Home Meds:  Medications Prior to Admission   Medication Sig Dispense Refill Last Dose    citalopram (CeleXA) 40 MG tablet Take 1 tablet by mouth Every Evening.       fentaNYL (DURAGESIC) 50 MCG/HR patch Place 1 patch on the skin as directed by provider Every Other Day.       HYDROcodone-acetaminophen (NORCO)  MG per tablet Take 1 tablet by  mouth Every 6 (Six) Hours As Needed for Moderate Pain.       ALPRAZolam (XANAX) 0.5 MG tablet Take 1 tablet by mouth 2 (Two) Times a Day.       diclofenac (CATAFLAM) 50 MG tablet Take 1 tablet by mouth 3 (Three) Times a Day.       methylPREDNISolone (MEDROL) 4 MG dose pack Take as directed on package instructions. 21 tablet 0     multivitamin tablet tablet Take 1 tablet by mouth Daily.       tiZANidine (ZANAFLEX) 4 MG tablet Take 1 tablet by mouth Every 6 (Six) Hours As Needed for Muscle Spasms.        Current Meds:   ALPRAZolam, 0.5 mg, Oral, BID  cefTRIAXone, 2,000 mg, Intravenous, Q24H  fentaNYL, 1 patch, Transdermal, Q48H   And  [START ON 1/6/2024] Check Fentanyl Patch Placement, 1 each, Does not apply, Q12H  citalopram, 40 mg, Oral, Q PM  methylPREDNISolone sodium succinate, 40 mg, Intravenous, Q8H  metroNIDAZOLE, 500 mg, Intravenous, Q8H  potassium chloride ER, 40 mEq, Oral, Q4H      Allergies:  No Known Allergies  Review of Systems  There is weakness and fatigue all other systems reviewed and negative     Objective     Vital Signs  Temp:  [96.5 °F (35.8 °C)-99.8 °F (37.7 °C)] 98.8 °F (37.1 °C)  Heart Rate:  [] 84  Resp:  [14-18] 17  BP: (104-144)/(51-96) 105/57  Physical Exam:  General Appearance:    Alert, cooperative, in no acute distress   Head:    Normocephalic, without obvious abnormality, atraumatic   Eyes:          conjunctivae and sclerae normal, no   icterus   Throat:   no thrush, oral mucosa moist   Neck:   Supple, no adenopathy   Lungs:     Clear to auscultation bilaterally    Heart:    Regular rhythm and normal rate    Chest Wall:    No abnormalities observed   Abdomen:     Soft, nondistended, nontender; normal bowel sounds   Extremities:   no edema, no redness   Skin:   No bruising or rash   Psychiatric:  normal mood and insight     Results Review:   I reviewed the patient's new clinical results.    Results from last 7 days   Lab Units 01/05/24  0416 01/04/24  2330 01/04/24  1535   WBC  10*3/mm3  --  11.95* 13.30*   HEMOGLOBIN g/dL 8.5* 8.4* 10.7*   HEMATOCRIT % 25.3* 25.6* 32.3*   PLATELETS 10*3/mm3  --  279 361     Results from last 7 days   Lab Units 01/05/24  0416 01/04/24  1535   SODIUM mmol/L 138 132*   POTASSIUM mmol/L 3.1* 2.9*   CHLORIDE mmol/L 109* 96*   CO2 mmol/L 19.0* 16.3*   BUN mg/dL 31* 74*   CREATININE mg/dL 0.73 1.25*   CALCIUM mg/dL 7.8* 9.1   BILIRUBIN mg/dL 0.2 0.2   ALK PHOS U/L 31* 44   ALT (SGPT) U/L 17 23   AST (SGOT) U/L 16 14   GLUCOSE mg/dL 125* 185*     Results from last 7 days   Lab Units 01/04/24  1623   INR  1.15*     Lab Results   Lab Value Date/Time    LIPASE 28 01/04/2024 1535    LIPASE 16 02/16/2023 1440    LIPASE 21 02/18/2022 0748    LIPASE 18 09/11/2021 1235    LIPASE 18 04/25/2021 1641       Radiology:  CT Head Without Contrast   Final Result   There is no evidence of fracture or of intracranial   hemorrhage. Further evaluation could be performed with MRI examination   of the brain as indicated.       CT CERVICAL SPINE WITHOUT CONTRAST: There is moderate loss of disc   height at C5-C6. There is no evidence of prevertebral edema or a   fracture.       C2-C3: A small central disc bulge is present.       C3-C4: A small central disc bulge is present. Mild facet degenerative   disease is present on the right.       C4-C5: A mild central disc bulge is present. Mild-to-moderate facet   degenerative disease is present on the right.       C5-C6: A mild central disc osteophyte complex is present with no   evidence of herniation.       C6-C7: A small central disc bulge is present with no evidence of   herniation.       C7-T1: There is no evidence of disc bulge or herniation.       IMPRESSION: Multilevel degenerative disease involving the cervical spine   is noted as described above. There is no evidence of fracture. See   above.               Radiation dose reduction techniques were utilized, including automated   exposure control and exposure modulation based on body  size.           This report was finalized on 1/4/2024 7:41 PM by Dr. Morteza Scruggs M.D   on Workstation: [id number]          CT Cervical Spine Without Contrast   Final Result   There is no evidence of fracture or of intracranial   hemorrhage. Further evaluation could be performed with MRI examination   of the brain as indicated.       CT CERVICAL SPINE WITHOUT CONTRAST: There is moderate loss of disc   height at C5-C6. There is no evidence of prevertebral edema or a   fracture.       C2-C3: A small central disc bulge is present.       C3-C4: A small central disc bulge is present. Mild facet degenerative   disease is present on the right.       C4-C5: A mild central disc bulge is present. Mild-to-moderate facet   degenerative disease is present on the right.       C5-C6: A mild central disc osteophyte complex is present with no   evidence of herniation.       C6-C7: A small central disc bulge is present with no evidence of   herniation.       C7-T1: There is no evidence of disc bulge or herniation.       IMPRESSION: Multilevel degenerative disease involving the cervical spine   is noted as described above. There is no evidence of fracture. See   above.               Radiation dose reduction techniques were utilized, including automated   exposure control and exposure modulation based on body size.           This report was finalized on 1/4/2024 7:41 PM by Dr. Morteza Scruggs M.D   on Workstation: [id number]          CT Angiogram Abdomen Pelvis   Final Result   1. Diffuse colitis extending from the cecum to the rectum.   2. Atherosclerotic disease. Mild-to-moderate narrowing of the proximal   celiac and superior mesenteric arteries. Multifocal mild iliac stenoses.   No evidence for aneurysm.        Radiation dose reduction techniques were utilized, including automated   exposure control and exposure modulation based on body size.           This report was finalized on 1/4/2024 7:38 PM by Dr. Lionel Rao M.D on  Workstation: [id number]          XR Chest 1 View   Final Result   No evidence for active disease in the chest.       This report was finalized on 1/4/2024 5:06 PM by Dr. Lionel Rao M.D on Workstation: [email or web address]              Assessment & Plan   Active Hospital Problems    Diagnosis     **Acute upper GI bleed        Assessment:  Black watery loose stool  Abdominal pain  History of collagenous colitis  Opiate withdrawal  Electrolyte disturbance  Acute kidney injury  Dehydration    Plan:  Continue broad-spectrum antibiotics  Await GI PCR and C. difficile  No indication for lower endoscopy at this time  Clear liquid diet      I discussed the patients findings and my recommendations with patient and nursing staff.    Selvin Gipson MD

## 2024-01-05 NOTE — NURSING NOTE
Pt received from the Ed, pt calm and cooperative.  Settled into bed and room. Meds given and labs sent.  Pt awake and alert.  Follows all directions. Pt asks appropriate questions.  Will monitor.

## 2024-01-05 NOTE — ED NOTES
.Nursing report ED to floor  Meme Mcginnis  63 y.o.  female    HPI :   Chief Complaint   Patient presents with    Withdrawal    Black or Bloody Stool       Admitting doctor:   Robby Bolanos MD    Admitting diagnosis:   The primary encounter diagnosis was Acute upper GI bleed. Diagnoses of Acute renal failure, unspecified acute renal failure type, Lactic acid increased, Elevated procalcitonin, Troponin level elevated, Other chronic pain, and Colitis were also pertinent to this visit.    Code status:   Current Code Status       Date Active Code Status Order ID Comments User Context       Prior            Allergies:   Patient has no known allergies.    Isolation:   No active isolations    Intake and Output    Intake/Output Summary (Last 24 hours) at 1/4/2024 2104  Last data filed at 1/4/2024 1726  Gross per 24 hour   Intake 1000 ml   Output --   Net 1000 ml       Weight:       01/04/24  1621   Weight: 58.4 kg (128 lb 12.8 oz)       Most recent vitals:   Vitals:    01/04/24 1900 01/04/24 1901 01/04/24 1906 01/04/24 1906   BP:  132/74 144/75 144/75   Pulse: 101 98 96 98   Resp:    14   Temp:    97.6 °F (36.4 °C)   TempSrc:    Oral   SpO2: 99% 98% 99% 96%   Weight:           Active LDAs/IV Access:   Lines, Drains & Airways       Active LDAs       Name Placement date Placement time Site Days    Peripheral IV 01/04/24 1538 Anterior;Left Forearm 01/04/24  1538  Forearm  less than 1    Peripheral IV 01/04/24 1620 Right Antecubital 01/04/24  1620  Antecubital  less than 1    Peripheral IV 01/04/24 1711 Left Antecubital 01/04/24  1711  Antecubital  less than 1                    Labs (abnormal labs have a star):   Labs Reviewed   COMPREHENSIVE METABOLIC PANEL - Abnormal; Notable for the following components:       Result Value    Glucose 185 (*)     BUN 74 (*)     Creatinine 1.25 (*)     Sodium 132 (*)     Potassium 2.9 (*)     Chloride 96 (*)     CO2 16.3 (*)     BUN/Creatinine Ratio 59.2 (*)     Anion Gap 19.7 (*)     eGFR  49.1 (*)     All other components within normal limits    Narrative:     GFR Normal >60  Chronic Kidney Disease <60  Kidney Failure <15     LACTIC ACID, PLASMA - Abnormal; Notable for the following components:    Lactate 5.9 (*)     All other components within normal limits   CBC WITH AUTO DIFFERENTIAL - Abnormal; Notable for the following components:    WBC 13.30 (*)     RBC 3.40 (*)     Hemoglobin 10.7 (*)     Hematocrit 32.3 (*)     Neutrophil % 76.4 (*)     Lymphocyte % 16.9 (*)     Eosinophil % 0.0 (*)     Immature Grans % 0.8 (*)     Neutrophils, Absolute 10.16 (*)     Immature Grans, Absolute 0.11 (*)     All other components within normal limits   LACTIC ACID, REFLEX - Abnormal; Notable for the following components:    Lactate 3.8 (*)     All other components within normal limits   PROTIME-INR - Abnormal; Notable for the following components:    Protime 14.9 (*)     INR 1.15 (*)     All other components within normal limits   APTT - Abnormal; Notable for the following components:    PTT 21.6 (*)     All other components within normal limits   URINALYSIS W/ MICROSCOPIC IF INDICATED (NO CULTURE) - Abnormal; Notable for the following components:    Blood, UA Moderate (2+) (*)     Leuk Esterase, UA Small (1+) (*)     All other components within normal limits   MAGNESIUM - Abnormal; Notable for the following components:    Magnesium 2.8 (*)     All other components within normal limits   TROPONIN - Abnormal; Notable for the following components:    HS Troponin T 39 (*)     All other components within normal limits    Narrative:     High Sensitive Troponin T Reference Range:  <14.0 ng/L- Negative Female for AMI  <22.0 ng/L- Negative Male for AMI  >=14 - Abnormal Female indicating possible myocardial injury.  >=22 - Abnormal Male indicating possible myocardial injury.   Clinicians would have to utilize clinical acumen, EKG, Troponin, and serial changes to determine if it is an Acute Myocardial Infarction or  "myocardial injury due to an underlying chronic condition.        BNP (IN-HOUSE) - Abnormal; Notable for the following components:    proBNP 1,683.0 (*)     All other components within normal limits    Narrative:     This assay is used as an aid in the diagnosis of individuals suspected of having heart failure. It can be used as an aid in the diagnosis of acute decompensated heart failure (ADHF) in patients presenting with signs and symptoms of ADHF to the emergency department (ED). In addition, NT-proBNP of <300 pg/mL indicates ADHF is not likely.    Age Range Result Interpretation  NT-proBNP Concentration (pg/mL:      <50             Positive            >450                   Gray                 300-450                    Negative             <300    50-75           Positive            >900                  Gray                300-900                  Negative            <300      >75             Positive            >1800                  Gray                300-1800                  Negative            <300   PROCALCITONIN - Abnormal; Notable for the following components:    Procalcitonin 6.14 (*)     All other components within normal limits    Narrative:     As a Marker for Sepsis (Non-Neonates):    1. <0.5 ng/mL represents a low risk of severe sepsis and/or septic shock.  2. >2 ng/mL represents a high risk of severe sepsis and/or septic shock.    As a Marker for Lower Respiratory Tract Infections that require antibiotic therapy:    PCT on Admission    Antibiotic Therapy       6-12 Hrs later    >0.5                Strongly Recommended  >0.25 - <0.5        Recommended   0.1 - 0.25          Discouraged              Remeasure/reassess PCT  <0.1                Strongly Discouraged     Remeasure/reassess PCT    As 28 day mortality risk marker: \"Change in Procalcitonin Result\" (>80% or <=80%) if Day 0 (or Day 1) and Day 4 values are available. Refer to http://www.[employer]-pct-calculator.com    Change in PCT <=80%  A " decrease of PCT levels below or equal to 80% defines a positive change in PCT test result representing a higher risk for 28-day all-cause mortality of patients diagnosed with severe sepsis for septic shock.    Change in PCT >80%  A decrease of PCT levels of more than 80% defines a negative change in PCT result representing a lower risk for 28-day all-cause mortality of patients diagnosed with severe sepsis or septic shock.      HIGH SENSITIVITIY TROPONIN T 2HR - Abnormal; Notable for the following components:    HS Troponin T 33 (*)     Troponin T Delta -6 (*)     All other components within normal limits    Narrative:     High Sensitive Troponin T Reference Range:  <14.0 ng/L- Negative Female for AMI  <22.0 ng/L- Negative Male for AMI  >=14 - Abnormal Female indicating possible myocardial injury.  >=22 - Abnormal Male indicating possible myocardial injury.   Clinicians would have to utilize clinical acumen, EKG, Troponin, and serial changes to determine if it is an Acute Myocardial Infarction or myocardial injury due to an underlying chronic condition.        LACTIC ACID, REFLEX - Abnormal; Notable for the following components:    Lactate 2.7 (*)     All other components within normal limits   URINALYSIS, MICROSCOPIC ONLY - Abnormal; Notable for the following components:    RBC, UA 3-5 (*)     WBC, UA 3-5 (*)     All other components within normal limits   POCT OCCULT BLOOD STOOL (ED ONLY) - Abnormal; Notable for the following components:    Fecal Occult Blood Positive (*)     All other components within normal limits   LIPASE - Normal   BLOOD CULTURE   BLOOD CULTURE   CLOSTRIDIOIDES DIFFICILE TOXIN    Narrative:     The following orders were created for panel order Clostridioides difficile Toxin - Stool, Per Rectum.  Procedure                               Abnormality         Status                     ---------                               -----------         ------                     Clostridioides  difficile...[[id number]]                                                   Please view results for these tests on the individual orders.   CLOSTRIDIOIDES DIFFICILE TOXIN, PCR   GASTROINTESTINAL PANEL, PCR (PREFERRED) DOES NOT INCLUDE CDIFF   RAINBOW DRAW    Narrative:     The following orders were created for panel order [place] Draw.  Procedure                               Abnormality         Status                     ---------                               -----------         ------                     Green Top (Gel)[[id number]]                                  Final result               Lavender Top[[id number]]                                     Final result               Gold Top - SST[[id number]]                                   Final result               Light Blue Top[[id number]]                                                                Please view results for these tests on the individual orders.   OCCULT BLOOD X 1, STOOL   HEMOGLOBIN AND HEMATOCRIT, BLOOD   LACTIC ACID, REFLEX   POCT OCCULT BLOOD STOOL   TYPE AND SCREEN   PREPARE RBC   ABORH 2ND SPECIMEN VERIFICATION   CBC AND DIFFERENTIAL    Narrative:     The following orders were created for panel order CBC & Differential.  Procedure                               Abnormality         Status                     ---------                               -----------         ------                     CBC Auto Differential[[id number]]        Abnormal            Final result                 Please view results for these tests on the individual orders.   GREEN TOP   LAVENDER TOP   GOLD TOP - SST   LIGHT BLUE TOP       EKG:   ECG 12 Lead Other; Diffuse body aches, opiate withdrawal, GI bleed   Final Result   HEART RATE= 106  bpm   RR Interval= 566  ms   NJ Interval= 104  ms   P Horizontal Axis= -35  deg   P Front Axis= 85  deg   QRSD Interval= 67  ms   QT Interval= 438  ms   QTcB= 582  ms   QRS Axis= 81  deg   T Wave Axis= -65  deg   - ABNORMAL ECG -   Sinus  tachycardia   Probable left atrial enlargement   Borderline right axis deviation   Borderline repolarization abnormality   Prolonged QT interval   Rate has increased and PVCs have resolved   Electronically Signed By: Denise Briscoe ([facility]) 04-Jan-2024 17:24:09   Date and Time of Study: 2024-01-04 16:31:51          Meds given in ED:   Medications   sodium chloride 0.9 % flush 10 mL (has no administration in time range)   pantoprazole (PROTONIX) 40 mg in 100 mL NS (VTB) (8 mg/hr Intravenous Currently Infusing 1/4/24 4616)   Potassium Replacement - Follow Nurse / BPA Driven Protocol (has no administration in time range)   ALPRAZolam (XANAX) tablet 1 mg (has no administration in time range)   citalopram (CeleXA) tablet 40 mg (has no administration in time range)   fentaNYL (DURAGESIC) 100 MCG/HR patch 1 patch (has no administration in time range)     And   Check Fentanyl Patch Placement (has no administration in time range)   HYDROcodone-acetaminophen (NORCO)  MG per tablet 1 tablet (has no administration in time range)   tiZANidine (ZANAFLEX) tablet 2 mg (has no administration in time range)   ondansetron (ZOFRAN) injection 4 mg (has no administration in time range)   sodium chloride 0.9 % infusion (has no administration in time range)   Potassium Replacement - Follow Nurse / BPA Driven Protocol (has no administration in time range)   Magnesium Standard Dose Replacement - Follow Nurse / BPA Driven Protocol (has no administration in time range)   Phosphorus Replacement - Follow Nurse / BPA Driven Protocol (has no administration in time range)   Calcium Replacement - Follow Nurse / BPA Driven Protocol (has no administration in time range)   cefTRIAXone (ROCEPHIN) 1,000 mg in sodium chloride 0.9 % 100 mL IVPB-VTB (has no administration in time range)   methylPREDNISolone sodium succinate (SOLU-Medrol) injection 40 mg (has no administration in time range)   metroNIDAZOLE (FLAGYL) IVPB 500 mg (has no administration in  time range)   sodium chloride 0.9 % bolus 1,000 mL (0 mL Intravenous Stopped 1/4/24 1630)   pantoprazole (PROTONIX) injection 80 mg (80 mg Intravenous Given 1/4/24 1626)   cefTRIAXone (ROCEPHIN) 2,000 mg in sodium chloride 0.9 % 100 mL IVPB-VTB (0 mg Intravenous Stopped 1/4/24 1812)   sodium chloride 0.9 % bolus 1,752 mL (0 mL Intravenous Stopped 1/4/24 1828)   fentaNYL citrate (PF) (SUBLIMAZE) injection 50 mcg (50 mcg Intravenous Given 1/4/24 1725)   ondansetron (ZOFRAN) injection 4 mg (4 mg Intravenous Given 1/4/24 1725)   iopamidol (ISOVUE-370) 76 % injection 100 mL (95 mL Intravenous Given by Other 1/4/24 1807)       Imaging results:  CT Head Without Contrast    Result Date: 1/4/2024  There is no evidence of fracture or of intracranial hemorrhage. Further evaluation could be performed with MRI examination of the brain as indicated.  CT CERVICAL SPINE WITHOUT CONTRAST: There is moderate loss of disc height at C5-C6. There is no evidence of prevertebral edema or a fracture.  C2-C3: A small central disc bulge is present.  C3-C4: A small central disc bulge is present. Mild facet degenerative disease is present on the right.  C4-C5: A mild central disc bulge is present. Mild-to-moderate facet degenerative disease is present on the right.  C5-C6: A mild central disc osteophyte complex is present with no evidence of herniation.  C6-C7: A small central disc bulge is present with no evidence of herniation.  C7-T1: There is no evidence of disc bulge or herniation.  IMPRESSION: Multilevel degenerative disease involving the cervical spine is noted as described above. There is no evidence of fracture. See above.    Radiation dose reduction techniques were utilized, including automated exposure control and exposure modulation based on body size.   This report was finalized on 1/4/2024 7:41 PM by Dr. Morteza Scruggs M.D on Workstation: [id number]      CT Cervical Spine Without Contrast    Result Date: 1/4/2024  There is no  evidence of fracture or of intracranial hemorrhage. Further evaluation could be performed with MRI examination of the brain as indicated.  CT CERVICAL SPINE WITHOUT CONTRAST: There is moderate loss of disc height at C5-C6. There is no evidence of prevertebral edema or a fracture.  C2-C3: A small central disc bulge is present.  C3-C4: A small central disc bulge is present. Mild facet degenerative disease is present on the right.  C4-C5: A mild central disc bulge is present. Mild-to-moderate facet degenerative disease is present on the right.  C5-C6: A mild central disc osteophyte complex is present with no evidence of herniation.  C6-C7: A small central disc bulge is present with no evidence of herniation.  C7-T1: There is no evidence of disc bulge or herniation.  IMPRESSION: Multilevel degenerative disease involving the cervical spine is noted as described above. There is no evidence of fracture. See above.    Radiation dose reduction techniques were utilized, including automated exposure control and exposure modulation based on body size.   This report was finalized on 1/4/2024 7:41 PM by Dr. Morteza Scruggs M.D on Workstation: [id number]      CT Angiogram Abdomen Pelvis    Result Date: 1/4/2024  1. Diffuse colitis extending from the cecum to the rectum. 2. Atherosclerotic disease. Mild-to-moderate narrowing of the proximal celiac and superior mesenteric arteries. Multifocal mild iliac stenoses. No evidence for aneurysm.  Radiation dose reduction techniques were utilized, including automated exposure control and exposure modulation based on body size.   This report was finalized on 1/4/2024 7:38 PM by Dr. Lionel Rao M.D on Workstation: [id number]      XR Chest 1 View    Result Date: 1/4/2024  No evidence for active disease in the chest.  This report was finalized on 1/4/2024 5:06 PM by Dr. Lionel Rao M.D on Workstation: [id number]         Social issues:   Social History     Socioeconomic History    Marital  status:    Tobacco Use    Smoking status: Former     Packs/day: 0.25     Years: 35.00     Additional pack years: 0.00     Total pack years: 8.75     Types: Cigarettes     Quit date: 10/16/2022     Years since quittin.2    Smokeless tobacco: Never   Vaping Use    Vaping Use: Some days    Substances: Nicotine   Substance and Sexual Activity    Alcohol use: Not Currently    Drug use: Not Currently    Sexual activity: Defer       NIH Stroke Scale:       Sheeba Reno RN  24 21:04 EST

## 2024-01-05 NOTE — NURSING NOTE
CWON note: consult received for purplish discoloration of buttocks. Pt is alert and oriented, now continent of bowel and bladder. Her entire buttocks is noted to have a light purple lacy reticulated rash. Perianal skin is light pink but nothing of note to be concerned about. She reports having some diarrhea, that now seems to be controlled. Zinc barrier cream can be applied as needed for any discomfort from stooling. Pt is able to turn and reposition self. No additional needs noted.

## 2024-01-05 NOTE — PROGRESS NOTES
Reviewed the CT of the abdomen and it is consistent with diffuse colitis.    Check stool PCR and C. difficile  Add metronidazole and continue Flagyl  Start Solu-Medrol 40 mg 3 times daily for microscopic colitis and when nausea/vomiting resolve/improve then we could transition him to budesonide 9 mg daily  Continue PPI drip although positive FOBT is probably secondary to colitis rather than upper GI bleed

## 2024-01-05 NOTE — SEPSIS FOCUSED EXAM
SEPTIC SHOCK FOCUSED EXAM ATTESTATION    I attest that I have reassessed tissue perfusion after the fluid bolus given.    Robby Bolanos MD  01/04/24  19:28 EST

## 2024-01-06 LAB
ALBUMIN SERPL-MCNC: 3.4 G/DL (ref 3.5–5.2)
ALBUMIN/GLOB SERPL: 2 G/DL
ALP SERPL-CCNC: 28 U/L (ref 39–117)
ALT SERPL W P-5'-P-CCNC: 19 U/L (ref 1–33)
ANION GAP SERPL CALCULATED.3IONS-SCNC: 9.5 MMOL/L (ref 5–15)
AST SERPL-CCNC: 18 U/L (ref 1–32)
BASOPHILS # BLD AUTO: 0 10*3/MM3 (ref 0–0.2)
BASOPHILS NFR BLD AUTO: 0 % (ref 0–1.5)
BILIRUB SERPL-MCNC: 0.2 MG/DL (ref 0–1.2)
BUN SERPL-MCNC: 13 MG/DL (ref 8–23)
BUN/CREAT SERPL: 22 (ref 7–25)
CALCIUM SPEC-SCNC: 8.2 MG/DL (ref 8.6–10.5)
CHLORIDE SERPL-SCNC: 104 MMOL/L (ref 98–107)
CO2 SERPL-SCNC: 20.5 MMOL/L (ref 22–29)
CREAT SERPL-MCNC: 0.59 MG/DL (ref 0.57–1)
DEPRECATED RDW RBC AUTO: 44.7 FL (ref 37–54)
EGFRCR SERPLBLD CKD-EPI 2021: 102.7 ML/MIN/1.73
EOSINOPHIL # BLD AUTO: 0 10*3/MM3 (ref 0–0.4)
EOSINOPHIL NFR BLD AUTO: 0 % (ref 0.3–6.2)
ERYTHROCYTE [DISTWIDTH] IN BLOOD BY AUTOMATED COUNT: 13.1 % (ref 12.3–15.4)
GLOBULIN UR ELPH-MCNC: 1.7 GM/DL
GLUCOSE SERPL-MCNC: 137 MG/DL (ref 65–99)
HCT VFR BLD AUTO: 21.7 % (ref 34–46.6)
HCT VFR BLD AUTO: 23.3 % (ref 34–46.6)
HGB BLD-MCNC: 7.3 G/DL (ref 12–15.9)
HGB BLD-MCNC: 7.7 G/DL (ref 12–15.9)
IMM GRANULOCYTES # BLD AUTO: 0.1 10*3/MM3 (ref 0–0.05)
IMM GRANULOCYTES NFR BLD AUTO: 1.6 % (ref 0–0.5)
LYMPHOCYTES # BLD AUTO: 1 10*3/MM3 (ref 0.7–3.1)
LYMPHOCYTES NFR BLD AUTO: 15.6 % (ref 19.6–45.3)
MAGNESIUM SERPL-MCNC: 2 MG/DL (ref 1.6–2.4)
MCH RBC QN AUTO: 31.7 PG (ref 26.6–33)
MCHC RBC AUTO-ENTMCNC: 33.6 G/DL (ref 31.5–35.7)
MCV RBC AUTO: 94.3 FL (ref 79–97)
MONOCYTES # BLD AUTO: 0.28 10*3/MM3 (ref 0.1–0.9)
MONOCYTES NFR BLD AUTO: 4.4 % (ref 5–12)
NEUTROPHILS NFR BLD AUTO: 5.02 10*3/MM3 (ref 1.7–7)
NEUTROPHILS NFR BLD AUTO: 78.4 % (ref 42.7–76)
NRBC BLD AUTO-RTO: 0.3 /100 WBC (ref 0–0.2)
PHOSPHATE SERPL-MCNC: 2.3 MG/DL (ref 2.5–4.5)
PLATELET # BLD AUTO: 237 10*3/MM3 (ref 140–450)
PMV BLD AUTO: 10.1 FL (ref 6–12)
POTASSIUM SERPL-SCNC: 4.4 MMOL/L (ref 3.5–5.2)
PROCALCITONIN SERPL-MCNC: 0.82 NG/ML (ref 0–0.25)
PROT SERPL-MCNC: 5.1 G/DL (ref 6–8.5)
RBC # BLD AUTO: 2.3 10*6/MM3 (ref 3.77–5.28)
SODIUM SERPL-SCNC: 134 MMOL/L (ref 136–145)
WBC NRBC COR # BLD AUTO: 6.4 10*3/MM3 (ref 3.4–10.8)

## 2024-01-06 PROCEDURE — 25010000002 METHYLPREDNISOLONE PER 40 MG: Performed by: INTERNAL MEDICINE

## 2024-01-06 PROCEDURE — 85014 HEMATOCRIT: CPT | Performed by: HOSPITALIST

## 2024-01-06 PROCEDURE — 25010000002 CEFTRIAXONE PER 250 MG: Performed by: INTERNAL MEDICINE

## 2024-01-06 PROCEDURE — 84145 PROCALCITONIN (PCT): CPT | Performed by: INTERNAL MEDICINE

## 2024-01-06 PROCEDURE — 84100 ASSAY OF PHOSPHORUS: CPT | Performed by: INTERNAL MEDICINE

## 2024-01-06 PROCEDURE — 25010000002 METRONIDAZOLE 500 MG/100ML SOLUTION: Performed by: INTERNAL MEDICINE

## 2024-01-06 PROCEDURE — 85018 HEMOGLOBIN: CPT | Performed by: HOSPITALIST

## 2024-01-06 PROCEDURE — 80053 COMPREHEN METABOLIC PANEL: CPT | Performed by: INTERNAL MEDICINE

## 2024-01-06 PROCEDURE — 99232 SBSQ HOSP IP/OBS MODERATE 35: CPT | Performed by: INTERNAL MEDICINE

## 2024-01-06 PROCEDURE — 83735 ASSAY OF MAGNESIUM: CPT | Performed by: INTERNAL MEDICINE

## 2024-01-06 PROCEDURE — 85025 COMPLETE CBC W/AUTO DIFF WBC: CPT | Performed by: INTERNAL MEDICINE

## 2024-01-06 RX ORDER — TIZANIDINE 4 MG/1
4 TABLET ORAL 3 TIMES DAILY PRN
Status: DISCONTINUED | OUTPATIENT
Start: 2024-01-06 | End: 2024-01-10 | Stop reason: HOSPADM

## 2024-01-06 RX ADMIN — SODIUM CHLORIDE 8 MG/HR: 9 INJECTION, SOLUTION INTRAVENOUS at 01:00

## 2024-01-06 RX ADMIN — HYDROCODONE BITARTRATE AND ACETAMINOPHEN 1 TABLET: 10; 325 TABLET ORAL at 07:40

## 2024-01-06 RX ADMIN — ALPRAZOLAM 0.5 MG: 0.5 TABLET ORAL at 21:05

## 2024-01-06 RX ADMIN — METHYLPREDNISOLONE SODIUM SUCCINATE 40 MG: 40 INJECTION INTRAMUSCULAR; INTRAVENOUS at 21:05

## 2024-01-06 RX ADMIN — TIZANIDINE 2 MG: 4 TABLET ORAL at 11:17

## 2024-01-06 RX ADMIN — METRONIDAZOLE 500 MG: 500 INJECTION, SOLUTION INTRAVENOUS at 03:33

## 2024-01-06 RX ADMIN — SODIUM CHLORIDE 8 MG/HR: 9 INJECTION, SOLUTION INTRAVENOUS at 06:01

## 2024-01-06 RX ADMIN — METHYLPREDNISOLONE SODIUM SUCCINATE 40 MG: 40 INJECTION INTRAMUSCULAR; INTRAVENOUS at 03:33

## 2024-01-06 RX ADMIN — METRONIDAZOLE 500 MG: 500 INJECTION, SOLUTION INTRAVENOUS at 11:25

## 2024-01-06 RX ADMIN — ALPRAZOLAM 0.5 MG: 0.5 TABLET ORAL at 08:31

## 2024-01-06 RX ADMIN — CITALOPRAM 40 MG: 40 TABLET, FILM COATED ORAL at 21:05

## 2024-01-06 RX ADMIN — SODIUM CHLORIDE 8 MG/HR: 9 INJECTION, SOLUTION INTRAVENOUS at 21:04

## 2024-01-06 RX ADMIN — HYDROCODONE BITARTRATE AND ACETAMINOPHEN 1 TABLET: 10; 325 TABLET ORAL at 17:57

## 2024-01-06 RX ADMIN — SODIUM CHLORIDE 8 MG/HR: 9 INJECTION, SOLUTION INTRAVENOUS at 16:13

## 2024-01-06 RX ADMIN — TIZANIDINE 2 MG: 4 TABLET ORAL at 03:45

## 2024-01-06 RX ADMIN — SODIUM CHLORIDE 8 MG/HR: 9 INJECTION, SOLUTION INTRAVENOUS at 11:29

## 2024-01-06 RX ADMIN — METRONIDAZOLE 500 MG: 500 INJECTION, SOLUTION INTRAVENOUS at 21:05

## 2024-01-06 RX ADMIN — METHYLPREDNISOLONE SODIUM SUCCINATE 40 MG: 40 INJECTION INTRAMUSCULAR; INTRAVENOUS at 11:25

## 2024-01-06 RX ADMIN — CEFTRIAXONE 2000 MG: 2 INJECTION, POWDER, FOR SOLUTION INTRAMUSCULAR; INTRAVENOUS at 16:01

## 2024-01-06 RX ADMIN — TIZANIDINE 4 MG: 4 TABLET ORAL at 19:43

## 2024-01-06 NOTE — PROGRESS NOTES
Gastroenterology   Inpatient Progress Note    Reason for Follow Up: Diarrhea, collagenous colitis    Subjective     Interval History:   Patient states she has not had a bowel movement since being in the ICU.    Current Facility-Administered Medications:     ALPRAZolam (XANAX) tablet 0.5 mg, 0.5 mg, Oral, BID, Bebeto Villegas MD, 0.5 mg at 01/06/24 0831    Calcium Replacement - Follow Nurse / BPA Driven Protocol, , Does not apply, PRN, Robby Bolanos MD    cefTRIAXone (ROCEPHIN) 2,000 mg in sodium chloride 0.9 % 100 mL IVPB-VTB, 2,000 mg, Intravenous, Q24H, Robby Bolanos MD, Last Rate: 200 mL/hr at 01/05/24 1613, 2,000 mg at 01/05/24 1613    fentaNYL (DURAGESIC) 50 MCG/HR patch 1 patch, 1 patch, Transdermal, Q48H, 1 patch at 01/05/24 1039 **AND** Check Fentanyl Patch Placement, 1 each, Does not apply, Q12H, Bebeto Villegas MD    citalopram (CeleXA) tablet 40 mg, 40 mg, Oral, Q PM, Bebeto Villegas MD, 40 mg at 01/05/24 2035    HYDROcodone-acetaminophen (NORCO)  MG per tablet 1 tablet, 1 tablet, Oral, Q6H PRN, Robby Bolanos MD, 1 tablet at 01/06/24 0740    Magnesium Standard Dose Replacement - Follow Nurse / BPA Driven Protocol, , Does not apply, PRN, Robby Bolanos MD    methylPREDNISolone sodium succinate (SOLU-Medrol) injection 40 mg, 40 mg, Intravenous, Q8H, Robby Bolanos MD, 40 mg at 01/06/24 1125    metroNIDAZOLE (FLAGYL) IVPB 500 mg, 500 mg, Intravenous, Q8H, Robby Bolanos MD, Last Rate: 100 mL/hr at 01/06/24 1125, 500 mg at 01/06/24 1125    ondansetron (ZOFRAN) injection 4 mg, 4 mg, Intravenous, Q6H PRN, Robby Bolanos MD, 4 mg at 01/05/24 0900    pantoprazole (PROTONIX) 40 mg in 100 mL NS (VTB), 8 mg/hr, Intravenous, Continuous, Polo Huang MD, Last Rate: 20 mL/hr at 01/06/24 1129, 8 mg/hr at 01/06/24 1129    Phosphorus Replacement - Follow Nurse / BPA Driven Protocol, , Does not apply, PRN, Robby Bolanos MD    Potassium Replacement - Follow Nurse / BPA Driven Protocol, , Does not apply, PRN,  Polo Huang MD    Potassium Replacement - Follow Nurse / BPA Driven Protocol, , Does not apply, PRN, Robby Bolanos MD    sodium chloride 0.9 % flush 10 mL, 10 mL, Intravenous, PRN, Emergency, Triage Protocol, MD    tiZANidine (ZANAFLEX) tablet 4 mg, 4 mg, Oral, TID PRN, Ubaldo Stevens MD  Review of Systems:    The following systems were reviewed and negative;  gastrointestinal    Objective     Vital Signs  Temp:  [98 °F (36.7 °C)-99.3 °F (37.4 °C)] 98.1 °F (36.7 °C)  Heart Rate:  [55-98] 69  Resp:  [14-18] 16  BP: ()/(43-71) 105/62  Body mass index is 20.73 kg/m².    Intake/Output Summary (Last 24 hours) at 1/6/2024 1228  Last data filed at 1/6/2024 1203  Gross per 24 hour   Intake 4740 ml   Output 1150 ml   Net 3590 ml     I/O this shift:  In: 1200 [P.O.:1200]  Out: -      Physical Exam:   General: patient awake, alert and cooperative   Eyes: no scleral icterus   Skin: warm and dry, not jaundiced   Abdomen: soft, nontender, nondistended; normal bowel sounds, no masses palpated, no periumbical lymphadenopathy   Psychiatric: Appropriate affect and behavior     Results Review:     I reviewed the patient's new clinical results.    Results from last 7 days   Lab Units 01/06/24 0341 01/05/24 2033 01/05/24 1910 01/05/24 0416 01/04/24  2330 01/04/24  1535   WBC 10*3/mm3 6.40  --   --   --  11.95* 13.30*   HEMOGLOBIN g/dL 7.3* 7.2* 6.5*   < > 8.4* 10.7*   HEMATOCRIT % 21.7* 22.4* 19.6*   < > 25.6* 32.3*   PLATELETS 10*3/mm3 237  --   --   --  279 361    < > = values in this interval not displayed.     Results from last 7 days   Lab Units 01/06/24 0341 01/05/24 1910 01/05/24 0416 01/04/24  1535   SODIUM mmol/L 134*  --  138 132*   POTASSIUM mmol/L 4.4 4.6 3.1* 2.9*   CHLORIDE mmol/L 104  --  109* 96*   CO2 mmol/L 20.5*  --  19.0* 16.3*   BUN mg/dL 13  --  31* 74*   CREATININE mg/dL 0.59  --  0.73 1.25*   CALCIUM mg/dL 8.2*  --  7.8* 9.1   BILIRUBIN mg/dL 0.2  --  0.2 0.2   ALK PHOS U/L 28*  --  31* 44    ALT (SGPT) U/L 19  --  17 23   AST (SGOT) U/L 18  --  16 14   GLUCOSE mg/dL 137*  --  125* 185*     Results from last 7 days   Lab Units 01/04/24  1623   INR  1.15*     Lab Results   Lab Value Date/Time    LIPASE 28 01/04/2024 1535    LIPASE 16 02/16/2023 1440    LIPASE 21 02/18/2022 0748    LIPASE 18 09/11/2021 1235    LIPASE 18 04/25/2021 1641       Radiology:  CT Head Without Contrast   Final Result   There is no evidence of fracture or of intracranial   hemorrhage. Further evaluation could be performed with MRI examination   of the brain as indicated.       CT CERVICAL SPINE WITHOUT CONTRAST: There is moderate loss of disc   height at C5-C6. There is no evidence of prevertebral edema or a   fracture.       C2-C3: A small central disc bulge is present.       C3-C4: A small central disc bulge is present. Mild facet degenerative   disease is present on the right.       C4-C5: A mild central disc bulge is present. Mild-to-moderate facet   degenerative disease is present on the right.       C5-C6: A mild central disc osteophyte complex is present with no   evidence of herniation.       C6-C7: A small central disc bulge is present with no evidence of   herniation.       C7-T1: There is no evidence of disc bulge or herniation.       IMPRESSION: Multilevel degenerative disease involving the cervical spine   is noted as described above. There is no evidence of fracture. See   above.               Radiation dose reduction techniques were utilized, including automated   exposure control and exposure modulation based on body size.           This report was finalized on 1/4/2024 7:41 PM by Dr. Morteza Scruggs M.D   on Workstation: [id number]          CT Cervical Spine Without Contrast   Final Result   There is no evidence of fracture or of intracranial   hemorrhage. Further evaluation could be performed with MRI examination   of the brain as indicated.       CT CERVICAL SPINE WITHOUT CONTRAST: There is moderate loss of disc    height at C5-C6. There is no evidence of prevertebral edema or a   fracture.       C2-C3: A small central disc bulge is present.       C3-C4: A small central disc bulge is present. Mild facet degenerative   disease is present on the right.       C4-C5: A mild central disc bulge is present. Mild-to-moderate facet   degenerative disease is present on the right.       C5-C6: A mild central disc osteophyte complex is present with no   evidence of herniation.       C6-C7: A small central disc bulge is present with no evidence of   herniation.       C7-T1: There is no evidence of disc bulge or herniation.       IMPRESSION: Multilevel degenerative disease involving the cervical spine   is noted as described above. There is no evidence of fracture. See   above.               Radiation dose reduction techniques were utilized, including automated   exposure control and exposure modulation based on body size.           This report was finalized on 1/4/2024 7:41 PM by Dr. Morteza Scruggs M.D   on Workstation: [id number]          CT Angiogram Abdomen Pelvis   Final Result   1. Diffuse colitis extending from the cecum to the rectum.   2. Atherosclerotic disease. Mild-to-moderate narrowing of the proximal   celiac and superior mesenteric arteries. Multifocal mild iliac stenoses.   No evidence for aneurysm.        Radiation dose reduction techniques were utilized, including automated   exposure control and exposure modulation based on body size.           This report was finalized on 1/4/2024 7:38 PM by Dr. Lionel Rao M.D on Workstation: [id number]          XR Chest 1 View   Final Result   No evidence for active disease in the chest.       This report was finalized on 1/4/2024 5:06 PM by Dr. Lionel Rao M.D on Workstation: [id number]              Assessment & Plan     Active Hospital Problems    Diagnosis     **Acute upper GI bleed        Assessment:  Diarrhea with history of collagenous colitis and CT showing diffuse  changes of colitis.  Patient has not had any diarrhea and stool studies are still pending.  Hypokalemia.  Resolved.  Acute kidney injury.  Creatinine improving.  History of collagenous colitis.      Plan:  Continue supportive care and broad-spectrum antibiotics.  Await stool studies  May advance diet as tolerated as there are no plans at this point for endoscopic evaluation.  I discussed the patients findings and my recommendations with patient and nursing staff.    MD Carter Gallegos M.D.  [facility] [place] [id number]  Office: [phone]

## 2024-01-06 NOTE — PROGRESS NOTES
"  Intensive Care Unit Daily Progress Note.   [facility] INTENSIVE CARE  1/6/2024    Patient Name:  Meme Mcginnis  MRN:  [id number]  YOB: 1960  Age: 63 y.o.  Sex: female         Reason for Admission / Chief Complaint:  Hypotension    Hospital Course:   63-year-old female who presented to the hospital for black watery diarrhea and found to be hypotensive so admitted to the ICU for further monitoring.    Interval History:  Not requiring any pressor medications  Blood pressure improved  Denies any nausea or vomiting  No episodes of diarrhea or bowel movement since admission  Denies any chest pain or palpitations  States that she is hungry and wants something to eat    All issues new to me today.  Prior hospital course, labs and imaging reviewed.    Physical Exam:  /62   Pulse 69   Temp 98.1 °F (36.7 °C) (Oral)   Resp 16   Ht 165.1 cm (65\")   Wt 56.5 kg (124 lb 9 oz)   SpO2 97%   BMI 20.73 kg/m²   Body mass index is 20.73 kg/m².    Intake/Output    Intake/Output Summary (Last 24 hours) at 1/6/2024 1227  Last data filed at 1/6/2024 1203  Gross per 24 hour   Intake 4740 ml   Output 1150 ml   Net 3590 ml     General: Alert, nontoxic, NAD  HEENT: NC/AT, EOMI, MMM  Neck: Supple, trachea midline  Cardiac: RRR, no murmur, gallops, rubs  Pulmonary: Clear to auscultation bilaterally, no adventitious breath sounds, normal respiratory effort  GI: Soft, non-tender, non-distended, normal bowel sounds  Extremities: Warm, well perfused, no LE edema  Skin: no visible rash  Neuro: CN II - XII grossly intact  Psychiatry: Normal mood and affect      Data Review:  Notable Labs:  Results from last 7 days   Lab Units 01/06/24  0341 01/05/24  2033 01/05/24  1910 01/05/24  0416 01/04/24  2330 01/04/24  1535   WBC 10*3/mm3 6.40  --   --   --  11.95* 13.30*   HEMOGLOBIN g/dL 7.3* 7.2* 6.5* 8.5* 8.4* 10.7*   PLATELETS 10*3/mm3 237  --   --   --  279 361     Results from last 7 days   Lab Units " 01/06/24  0341 01/05/24  1910 01/05/24  0416 01/04/24  1535   SODIUM mmol/L 134*  --  138 132*   POTASSIUM mmol/L 4.4 4.6 3.1* 2.9*   CHLORIDE mmol/L 104  --  109* 96*   CO2 mmol/L 20.5*  --  19.0* 16.3*   BUN mg/dL 13  --  31* 74*   CREATININE mg/dL 0.59  --  0.73 1.25*   GLUCOSE mg/dL 137*  --  125* 185*   CALCIUM mg/dL 8.2*  --  7.8* 9.1   MAGNESIUM mg/dL 2.0  --   --  2.8*   PHOSPHORUS mg/dL 2.3*  --   --   --    Estimated Creatinine Clearance: 89.3 mL/min (by C-G formula based on SCr of 0.59 mg/dL).    Results from last 7 days   Lab Units 01/06/24 0341 01/05/24 0416 01/04/24  2330 01/04/24 2017 01/04/24  1711 01/04/24  1536 01/04/24  1535   AST (SGOT) U/L 18 16  --   --   --   --  14   ALT (SGPT) U/L 19 17  --   --   --   --  23   PROCALCITONIN ng/mL 0.82*  --   --   --   --   --  6.14*   LACTATE mmol/L  --   --  1.2 2.7* 3.8*   < >  --    PLATELETS 10*3/mm3 237  --  279  --   --   --  361    < > = values in this interval not displayed.           Imaging:  Reviewed chest images personally from past 3 days    ASSESSMENT  /  PLAN:    Black watery diarrhea  Abdominal pain  Hypokalemia-resolved  Hyponatremia  Microscopic colitis exacerbation  Acute kidney injury-improving  Lactic acidosis-resolved  Dehydration  Chronic anemia  Chronic opioid dependence    -Continue broad-spectrum antibiotics with ceftriaxone and Flagyl, infectious workup pending  -GI PCR and C. difficile ordered, no bowel movement yet  -Continue steroids for microscopic colitis exacerbation, appreciate GI recommendations  -Continue IV fluids  -Continue to trend hemoglobin, transfuse for hemoglobin goal greater than 7  -Continue home fentanyl patch    GI prophylaxis: Protonix drip  DVT prophylaxis: SCDs  Huang catheter: No  Bowel regimen: Ordered  Diet: Clear liquid diet    Discharge: Transfer to floor.  Internal medicine consulted to assume care.    Ubaldo Stevens MD  [facility]  Pulmonary and Critical Care Medicine,  Interventional Pulmonology    Parts of this note may be an electronic transcription/translation of spoken language to printed text using the Dragon dictation system.

## 2024-01-07 LAB
ALBUMIN SERPL-MCNC: 3.7 G/DL (ref 3.5–5.2)
ALBUMIN/GLOB SERPL: 2.2 G/DL
ALP SERPL-CCNC: 34 U/L (ref 39–117)
ALT SERPL W P-5'-P-CCNC: 36 U/L (ref 1–33)
ANION GAP SERPL CALCULATED.3IONS-SCNC: 9.4 MMOL/L (ref 5–15)
AST SERPL-CCNC: 20 U/L (ref 1–32)
BASOPHILS # BLD AUTO: 0.01 10*3/MM3 (ref 0–0.2)
BASOPHILS NFR BLD AUTO: 0.1 % (ref 0–1.5)
BILIRUB SERPL-MCNC: <0.2 MG/DL (ref 0–1.2)
BUN SERPL-MCNC: 14 MG/DL (ref 8–23)
BUN/CREAT SERPL: 23.3 (ref 7–25)
CALCIUM SPEC-SCNC: 8.6 MG/DL (ref 8.6–10.5)
CHLORIDE SERPL-SCNC: 103 MMOL/L (ref 98–107)
CO2 SERPL-SCNC: 24.6 MMOL/L (ref 22–29)
CREAT SERPL-MCNC: 0.6 MG/DL (ref 0.57–1)
DEPRECATED RDW RBC AUTO: 46.3 FL (ref 37–54)
EGFRCR SERPLBLD CKD-EPI 2021: 102.3 ML/MIN/1.73
EOSINOPHIL # BLD AUTO: 0 10*3/MM3 (ref 0–0.4)
EOSINOPHIL NFR BLD AUTO: 0 % (ref 0.3–6.2)
ERYTHROCYTE [DISTWIDTH] IN BLOOD BY AUTOMATED COUNT: 13.3 % (ref 12.3–15.4)
GLOBULIN UR ELPH-MCNC: 1.7 GM/DL
GLUCOSE BLDC GLUCOMTR-MCNC: 145 MG/DL (ref 70–130)
GLUCOSE SERPL-MCNC: 142 MG/DL (ref 65–99)
HCT VFR BLD AUTO: 23.5 % (ref 34–46.6)
HCT VFR BLD AUTO: 23.5 % (ref 34–46.6)
HCT VFR BLD AUTO: 23.8 % (ref 34–46.6)
HGB BLD-MCNC: 7.8 G/DL (ref 12–15.9)
IMM GRANULOCYTES # BLD AUTO: 0.23 10*3/MM3 (ref 0–0.05)
IMM GRANULOCYTES NFR BLD AUTO: 2.7 % (ref 0–0.5)
LYMPHOCYTES # BLD AUTO: 1.24 10*3/MM3 (ref 0.7–3.1)
LYMPHOCYTES NFR BLD AUTO: 14.4 % (ref 19.6–45.3)
MCH RBC QN AUTO: 32 PG (ref 26.6–33)
MCHC RBC AUTO-ENTMCNC: 33.2 G/DL (ref 31.5–35.7)
MCV RBC AUTO: 96.3 FL (ref 79–97)
MONOCYTES # BLD AUTO: 0.33 10*3/MM3 (ref 0.1–0.9)
MONOCYTES NFR BLD AUTO: 3.8 % (ref 5–12)
NEUTROPHILS NFR BLD AUTO: 6.83 10*3/MM3 (ref 1.7–7)
NEUTROPHILS NFR BLD AUTO: 79 % (ref 42.7–76)
NRBC BLD AUTO-RTO: 1.3 /100 WBC (ref 0–0.2)
PLATELET # BLD AUTO: 287 10*3/MM3 (ref 140–450)
PMV BLD AUTO: 10.2 FL (ref 6–12)
POTASSIUM SERPL-SCNC: 4.2 MMOL/L (ref 3.5–5.2)
PROT SERPL-MCNC: 5.4 G/DL (ref 6–8.5)
RBC # BLD AUTO: 2.44 10*6/MM3 (ref 3.77–5.28)
SODIUM SERPL-SCNC: 137 MMOL/L (ref 136–145)
WBC NRBC COR # BLD AUTO: 8.64 10*3/MM3 (ref 3.4–10.8)

## 2024-01-07 PROCEDURE — 25010000002 METHYLPREDNISOLONE PER 40 MG: Performed by: INTERNAL MEDICINE

## 2024-01-07 PROCEDURE — 82948 REAGENT STRIP/BLOOD GLUCOSE: CPT

## 2024-01-07 PROCEDURE — 99232 SBSQ HOSP IP/OBS MODERATE 35: CPT

## 2024-01-07 PROCEDURE — 85014 HEMATOCRIT: CPT | Performed by: HOSPITALIST

## 2024-01-07 PROCEDURE — 80053 COMPREHEN METABOLIC PANEL: CPT | Performed by: HOSPITALIST

## 2024-01-07 PROCEDURE — 25010000002 CEFTRIAXONE PER 250 MG: Performed by: INTERNAL MEDICINE

## 2024-01-07 PROCEDURE — 25010000002 METRONIDAZOLE 500 MG/100ML SOLUTION: Performed by: INTERNAL MEDICINE

## 2024-01-07 PROCEDURE — 85018 HEMOGLOBIN: CPT | Performed by: HOSPITALIST

## 2024-01-07 PROCEDURE — 85025 COMPLETE CBC W/AUTO DIFF WBC: CPT | Performed by: INTERNAL MEDICINE

## 2024-01-07 RX ORDER — PANTOPRAZOLE SODIUM 40 MG/1
40 TABLET, DELAYED RELEASE ORAL
Status: DISCONTINUED | OUTPATIENT
Start: 2024-01-07 | End: 2024-01-10 | Stop reason: HOSPADM

## 2024-01-07 RX ADMIN — METRONIDAZOLE 500 MG: 500 INJECTION, SOLUTION INTRAVENOUS at 20:24

## 2024-01-07 RX ADMIN — ALPRAZOLAM 0.5 MG: 0.5 TABLET ORAL at 08:17

## 2024-01-07 RX ADMIN — TIZANIDINE 4 MG: 4 TABLET ORAL at 09:51

## 2024-01-07 RX ADMIN — METRONIDAZOLE 500 MG: 500 INJECTION, SOLUTION INTRAVENOUS at 11:45

## 2024-01-07 RX ADMIN — ALPRAZOLAM 0.5 MG: 0.5 TABLET ORAL at 20:08

## 2024-01-07 RX ADMIN — CITALOPRAM 40 MG: 40 TABLET, FILM COATED ORAL at 20:08

## 2024-01-07 RX ADMIN — SODIUM CHLORIDE 8 MG/HR: 9 INJECTION, SOLUTION INTRAVENOUS at 01:29

## 2024-01-07 RX ADMIN — SODIUM CHLORIDE 8 MG/HR: 9 INJECTION, SOLUTION INTRAVENOUS at 07:01

## 2024-01-07 RX ADMIN — METHYLPREDNISOLONE SODIUM SUCCINATE 40 MG: 40 INJECTION INTRAMUSCULAR; INTRAVENOUS at 20:08

## 2024-01-07 RX ADMIN — PANTOPRAZOLE SODIUM 40 MG: 40 TABLET, DELAYED RELEASE ORAL at 16:45

## 2024-01-07 RX ADMIN — METHYLPREDNISOLONE SODIUM SUCCINATE 40 MG: 40 INJECTION INTRAMUSCULAR; INTRAVENOUS at 11:44

## 2024-01-07 RX ADMIN — METHYLPREDNISOLONE SODIUM SUCCINATE 40 MG: 40 INJECTION INTRAMUSCULAR; INTRAVENOUS at 04:50

## 2024-01-07 RX ADMIN — HYDROCODONE BITARTRATE AND ACETAMINOPHEN 1 TABLET: 10; 325 TABLET ORAL at 20:08

## 2024-01-07 RX ADMIN — TIZANIDINE 4 MG: 4 TABLET ORAL at 16:45

## 2024-01-07 RX ADMIN — FENTANYL 1 PATCH: 50 PATCH, EXTENDED RELEASE TRANSDERMAL at 09:47

## 2024-01-07 RX ADMIN — CEFTRIAXONE 2000 MG: 2 INJECTION, POWDER, FOR SOLUTION INTRAMUSCULAR; INTRAVENOUS at 16:46

## 2024-01-07 RX ADMIN — HYDROCODONE BITARTRATE AND ACETAMINOPHEN 1 TABLET: 10; 325 TABLET ORAL at 04:50

## 2024-01-07 RX ADMIN — METRONIDAZOLE 500 MG: 500 INJECTION, SOLUTION INTRAVENOUS at 04:50

## 2024-01-07 NOTE — PROGRESS NOTES
Gastroenterology   Inpatient Progress Note    Reason for Follow Up: Diarrhea, collagenous colitis    Subjective  Interval History:   Denies bowel movement overnight.  No abdominal pain, nausea, or vomiting.  Hgb 7.8.    Current Facility-Administered Medications:     ALPRAZolam (XANAX) tablet 0.5 mg, 0.5 mg, Oral, BID, Bebeto Villegas MD, 0.5 mg at 01/07/24 0817    Calcium Replacement - Follow Nurse / BPA Driven Protocol, , Does not apply, PRN, Robby Bolanos MD    cefTRIAXone (ROCEPHIN) 2,000 mg in sodium chloride 0.9 % 100 mL IVPB-VTB, 2,000 mg, Intravenous, Q24H, Robby Bolanos MD, Last Rate: 200 mL/hr at 01/06/24 1601, 2,000 mg at 01/06/24 1601    fentaNYL (DURAGESIC) 50 MCG/HR patch 1 patch, 1 patch, Transdermal, Q48H, 1 patch at 01/07/24 0947 **AND** Check Fentanyl Patch Placement, 1 each, Does not apply, Q12H, Bebeto Villegas MD    citalopram (CeleXA) tablet 40 mg, 40 mg, Oral, Q PM, Bebeto Villegas MD, 40 mg at 01/06/24 2105    HYDROcodone-acetaminophen (NORCO)  MG per tablet 1 tablet, 1 tablet, Oral, Q6H PRN, Robby Bolanos MD, 1 tablet at 01/07/24 0450    Magnesium Standard Dose Replacement - Follow Nurse / BPA Driven Protocol, , Does not apply, PRN, Robby Bolanos MD    methylPREDNISolone sodium succinate (SOLU-Medrol) injection 40 mg, 40 mg, Intravenous, Q8H, Robby Bolanos MD, 40 mg at 01/07/24 1144    metroNIDAZOLE (FLAGYL) IVPB 500 mg, 500 mg, Intravenous, Q8H, Robby Bolanos MD, Last Rate: 100 mL/hr at 01/07/24 1145, 500 mg at 01/07/24 1145    ondansetron (ZOFRAN) injection 4 mg, 4 mg, Intravenous, Q6H PRN, Robby Bolanos MD, 4 mg at 01/05/24 0900    pantoprazole (PROTONIX) EC tablet 40 mg, 40 mg, Oral, BID Brandi POWER Aakash, MD    Phosphorus Replacement - Follow Nurse / BPA Driven Protocol, , Does not apply, Luis Miguel LEWIS Rami, MD    Potassium Replacement - Follow Nurse / BPA Driven Protocol, , Does not apply, Reina LEWIS Mark, MD    Potassium Replacement - Follow Nurse / BPA Driven  Protocol, , Does not apply, PRN, Robby Bolanos MD    sodium chloride 0.9 % flush 10 mL, 10 mL, Intravenous, PRN, Emergency, Triage Protocol, MD    tiZANidine (ZANAFLEX) tablet 4 mg, 4 mg, Oral, TID PRN, Ubaldo Stevens MD, 4 mg at 01/07/24 0951  Review of Systems:               The following systems were reviewed and negative;  gastrointestinal    Objective     Vital Signs  Temp:  [98.2 °F (36.8 °C)-99 °F (37.2 °C)] 98.4 °F (36.9 °C)  Heart Rate:  [63-94] 68  Resp:  [15-17] 16  BP: ()/(58-71) 132/71  Body mass index is 20.73 kg/m².                  General Appearance:  awake, alert, oriented, in no acute distress  Abdomen:  Soft, non-tender, normal bowel sounds; no bruits, organomegaly or masses.                Results Review:                I reviewed the patient's new clinical results.    Results from last 7 days   Lab Units 01/07/24  0745 01/06/24  1610 01/06/24  0341 01/05/24  0416 01/04/24  2330   WBC 10*3/mm3 8.64  --  6.40  --  11.95*   HEMOGLOBIN g/dL 7.8*  7.8* 7.7* 7.3*   < > 8.4*   HEMATOCRIT % 23.5*  23.5* 23.3* 21.7*   < > 25.6*   PLATELETS 10*3/mm3 287  --  237  --  279    < > = values in this interval not displayed.     Results from last 7 days   Lab Units 01/07/24  0745 01/06/24  0341 01/05/24  1910 01/05/24  0416   SODIUM mmol/L 137 134*  --  138   POTASSIUM mmol/L 4.2 4.4 4.6 3.1*   CHLORIDE mmol/L 103 104  --  109*   CO2 mmol/L 24.6 20.5*  --  19.0*   BUN mg/dL 14 13  --  31*   CREATININE mg/dL 0.60 0.59  --  0.73   CALCIUM mg/dL 8.6 8.2*  --  7.8*   BILIRUBIN mg/dL <0.2 0.2  --  0.2   ALK PHOS U/L 34* 28*  --  31*   ALT (SGPT) U/L 36* 19  --  17   AST (SGOT) U/L 20 18  --  16   GLUCOSE mg/dL 142* 137*  --  125*     Results from last 7 days   Lab Units 01/04/24  1623   INR  1.15*     Lab Results   Lab Value Date/Time    LIPASE 28 01/04/2024 1535    LIPASE 16 02/16/2023 1440    LIPASE 21 02/18/2022 0748       Radiology:  CT Head Without Contrast   Final Result   There is no evidence of  fracture or of intracranial   hemorrhage. Further evaluation could be performed with MRI examination   of the brain as indicated.       CT CERVICAL SPINE WITHOUT CONTRAST: There is moderate loss of disc   height at C5-C6. There is no evidence of prevertebral edema or a   fracture.       C2-C3: A small central disc bulge is present.       C3-C4: A small central disc bulge is present. Mild facet degenerative   disease is present on the right.       C4-C5: A mild central disc bulge is present. Mild-to-moderate facet   degenerative disease is present on the right.       C5-C6: A mild central disc osteophyte complex is present with no   evidence of herniation.       C6-C7: A small central disc bulge is present with no evidence of   herniation.       C7-T1: There is no evidence of disc bulge or herniation.       IMPRESSION: Multilevel degenerative disease involving the cervical spine   is noted as described above. There is no evidence of fracture. See   above.               Radiation dose reduction techniques were utilized, including automated   exposure control and exposure modulation based on body size.           This report was finalized on 1/4/2024 7:41 PM by Dr. Morteza Scruggs M.D   on Workstation: [id number]          CT Cervical Spine Without Contrast   Final Result   There is no evidence of fracture or of intracranial   hemorrhage. Further evaluation could be performed with MRI examination   of the brain as indicated.       CT CERVICAL SPINE WITHOUT CONTRAST: There is moderate loss of disc   height at C5-C6. There is no evidence of prevertebral edema or a   fracture.       C2-C3: A small central disc bulge is present.       C3-C4: A small central disc bulge is present. Mild facet degenerative   disease is present on the right.       C4-C5: A mild central disc bulge is present. Mild-to-moderate facet   degenerative disease is present on the right.       C5-C6: A mild central disc osteophyte complex is present with no    evidence of herniation.       C6-C7: A small central disc bulge is present with no evidence of   herniation.       C7-T1: There is no evidence of disc bulge or herniation.       IMPRESSION: Multilevel degenerative disease involving the cervical spine   is noted as described above. There is no evidence of fracture. See   above.               Radiation dose reduction techniques were utilized, including automated   exposure control and exposure modulation based on body size.           This report was finalized on 1/4/2024 7:41 PM by Dr. Morteza Scruggs M.D   on Workstation: [id number]          CT Angiogram Abdomen Pelvis   Final Result   1. Diffuse colitis extending from the cecum to the rectum.   2. Atherosclerotic disease. Mild-to-moderate narrowing of the proximal   celiac and superior mesenteric arteries. Multifocal mild iliac stenoses.   No evidence for aneurysm.        Radiation dose reduction techniques were utilized, including automated   exposure control and exposure modulation based on body size.           This report was finalized on 1/4/2024 7:38 PM by Dr. Lionel Rao M.D on Workstation: [id number]          XR Chest 1 View   Final Result   No evidence for active disease in the chest.       This report was finalized on 1/4/2024 5:06 PM by Dr. Lionel Rao M.D on Workstation: [id number]              Assessment & Plan     Active Hospital Problems    Diagnosis     **Acute upper GI bleed        Assessment:  Diarrhea with history of collagenous colitis, CT showing diffuse changes of colitis however has not had bowel movement since transfer out of ICU  Hypokalemia.  Resolved  Acute kidney injury.  These problems are new to me    Plan:  Nursing staff to notify GI service on call if any change in clinical status.  Continue to advance diet as tolerated  Continue supportive care and broad-spectrum antibiotics    I discussed the patients findings and my recommendations with patient.          La Cooper,  [facility] [place]  [address] [place] [address]

## 2024-01-07 NOTE — PROGRESS NOTES
Name: Meme Mcginnis ADMIT: 2024   : 1960  PCP: Radu Rawls MD    MRN: [id number] LOS: 3 days   AGE/SEX: 63 y.o. female  ROOM: [address]   This is a 63-year-old with a history of rheumatoid arthritis as well as chronic lower back pain on fentanyl and hydrocodone who presented to hospital with nausea and vomiting as well as black watery diarrhea with some red streaked stools.  Reportedly she ran out of her fentanyl patches two to three days prior to presentation.     She has a history of collagenous colitis diagnosed via colonoscopy.  She is around the hospital antibiotics with Rocephin and Flagyl.  Fentanyl patches were replaced.  CT scan of the abdomen pelvis showed diffuse changes of colitis.    She reports that she has not had a regular bowel movement yet.  Otherwise she has not noticed any more blood in her stool.    Objective   Objective   Vital Signs  Temp:  [98.1 °F (36.7 °C)-99 °F (37.2 °C)] 98.4 °F (36.9 °C)  Heart Rate:  [63-94] 68  Resp:  [14-17] 16  BP: ()/(58-71) 132/71  SpO2:  [95 %-100 %] 100 %  on   ;   Device (Oxygen Therapy): room air  Body mass index is 20.73 kg/m².  Physical Exam  Constitutional:       General: She is not in acute distress.  HENT:      Head: Normocephalic and atraumatic.   Eyes:      Extraocular Movements: Extraocular movements intact.      Pupils: Pupils are equal, round, and reactive to light.   Cardiovascular:      Rate and Rhythm: Normal rate and regular rhythm.   Pulmonary:      Effort: Pulmonary effort is normal. No respiratory distress.   Abdominal:      General: There is no distension.      Palpations: Abdomen is soft.      Tenderness: There is no abdominal tenderness.   Neurological:      General: No focal deficit present.      Mental Status: She is alert and oriented to person, place, and time.         Results Review     I reviewed the patient's new clinical results.  Results from last 7 days   Lab Units 24  [id number]  01/06/24  1610 01/06/24  0341 01/05/24  2033 01/05/24  0416 01/04/24  2330 01/04/24  1535   WBC 10*3/mm3 8.64  --  6.40  --   --  11.95* 13.30*   HEMOGLOBIN g/dL 7.8*  7.8* 7.7* 7.3* 7.2*   < > 8.4* 10.7*   PLATELETS 10*3/mm3 287  --  237  --   --  279 361    < > = values in this interval not displayed.     Results from last 7 days   Lab Units 01/07/24  0745 01/06/24 0341 01/05/24 1910 01/05/24 0416 01/04/24  1535   SODIUM mmol/L 137 134*  --  138 132*   POTASSIUM mmol/L 4.2 4.4 4.6 3.1* 2.9*   CHLORIDE mmol/L 103 104  --  109* 96*   CO2 mmol/L 24.6 20.5*  --  19.0* 16.3*   BUN mg/dL 14 13  --  31* 74*   CREATININE mg/dL 0.60 0.59  --  0.73 1.25*   GLUCOSE mg/dL 142* 137*  --  125* 185*   Estimated Creatinine Clearance: 87.8 mL/min (by C-G formula based on SCr of 0.6 mg/dL).  Results from last 7 days   Lab Units 01/07/24 0745 01/06/24 0341 01/05/24 0416 01/04/24  1535   ALBUMIN g/dL 3.7 3.4* 3.5 4.6   BILIRUBIN mg/dL <0.2 0.2 0.2 0.2   ALK PHOS U/L 34* 28* 31* 44   AST (SGOT) U/L 20 18 16 14   ALT (SGPT) U/L 36* 19 17 23     Results from last 7 days   Lab Units 01/07/24 0745 01/06/24 0341 01/05/24 0416 01/04/24  1535   CALCIUM mg/dL 8.6 8.2* 7.8* 9.1   ALBUMIN g/dL 3.7 3.4* 3.5 4.6   MAGNESIUM mg/dL  --  2.0  --  2.8*   PHOSPHORUS mg/dL  --  2.3*  --   --      Results from last 7 days   Lab Units 01/06/24  0341 01/04/24  2330 01/04/24  2017 01/04/24  1711 01/04/24  1536 01/04/24  1535   PROCALCITONIN ng/mL 0.82*  --   --   --   --  6.14*   LACTATE mmol/L  --  1.2 2.7* 3.8* 5.9*  --      COVID19   Date Value Ref Range Status   09/30/2022 Not Detected Not Detected - Ref. Range Final   04/27/2022 Not Detected Not Detected - Ref. Range Final     Glucose   Date/Time Value Ref Range Status   01/05/2024 0623 137 (H) 70 - 130 mg/dL Final   01/04/2024 2322 108 70 - 130 mg/dL Final     Results for orders placed or performed during the hospital encounter of 01/04/24   Blood Culture - Blood, Arm, Left    Specimen:  Arm, Left; Blood   Result Value Ref Range    Blood Culture No growth at 2 days          CT Head Without Contrast, CT Cervical Spine Without Contrast  Narrative: CT HEAD AND CERVICAL SPINE WITHOUT CONTRAST     HISTORY: Fall, hit head, headache, neck pain, syncope.     COMPARISON: None.     CT HEAD WITHOUT CONTRAST: The brain and ventricles are symmetrical.  There is no evidence of hemorrhage, hydrocephalus or of abnormal  extra-axial fluid. No focal area of decreased attenuation to suggest  acute infarction is identified. Mild vascular calcification is noted.  Bone windows show no evidence of a calvarial fracture.     Impression: There is no evidence of fracture or of intracranial  hemorrhage. Further evaluation could be performed with MRI examination  of the brain as indicated.     CT CERVICAL SPINE WITHOUT CONTRAST: There is moderate loss of disc  height at C5-C6. There is no evidence of prevertebral edema or a  fracture.     C2-C3: A small central disc bulge is present.     C3-C4: A small central disc bulge is present. Mild facet degenerative  disease is present on the right.     C4-C5: A mild central disc bulge is present. Mild-to-moderate facet  degenerative disease is present on the right.     C5-C6: A mild central disc osteophyte complex is present with no  evidence of herniation.     C6-C7: A small central disc bulge is present with no evidence of  herniation.     C7-T1: There is no evidence of disc bulge or herniation.     IMPRESSION: Multilevel degenerative disease involving the cervical spine  is noted as described above. There is no evidence of fracture. See  above.           Radiation dose reduction techniques were utilized, including automated  exposure control and exposure modulation based on body size.        This report was finalized on 1/4/2024 7:41 PM by Dr. Morteza Scruggs M.D  on Workstation: [id number]     CT Angiogram Abdomen Pelvis  Narrative: CT ANGIOGRAM ABDOMEN PELVIS WITH AND WITHOUT  CONTRAST     HISTORY: GI bleed.     TECHNIQUE: CT angiogram includes axial imaging through the abdomen and  pelvis without intravenous contrast followed by intravenous contrast  administration and imaging in the arterial and venous phases.  Three-dimensional volume rendering was performed.      COMPARISON: CT abdomen and pelvis 02/16/2023.     FINDINGS: There is mild mural thrombus along the left lateral wall of  the proximal SMA with mild-to-moderate narrowing. Mild-to-moderate  narrowing is present in the origin of celiac artery. Both main renal  arteries are patent. There is atherosclerotic disease involving the  abdominal aorta without aneurysm formation. Inferior mesenteric artery  is patent. Atherosclerotic calcifications are present involving both  common iliac arteries and internal and external iliac arteries with mild  narrowing. There is mild narrowing of both common femoral arteries. Both  proximal superficial and deep femoral arteries are patent.     Lung bases appear clear. Liver, spleen, adrenal glands, pancreas,  kidneys appear within normal limits. There is no hydronephrosis.     There is generalized, diffuse colonic wall thickening extending from the  cecum to the rectum consistent with diffuse colitis. There is no  evidence for bowel obstruction. There is no ascites or evidence for  intra-abdominal abscess formation.     Impression: 1. Diffuse colitis extending from the cecum to the rectum.  2. Atherosclerotic disease. Mild-to-moderate narrowing of the proximal  celiac and superior mesenteric arteries. Multifocal mild iliac stenoses.  No evidence for aneurysm.      Radiation dose reduction techniques were utilized, including automated  exposure control and exposure modulation based on body size.        This report was finalized on 1/4/2024 7:38 PM by Dr. Lionel Rao M.D on Workstation: [id number]     XR Chest 1 View  Narrative: CHEST SINGLE VIEW     HISTORY: Vomiting. GI bleed     COMPARISON:  AP chest 09/30/2022.     FINDINGS: Cardiomediastinal silhouette is normal. Lungs are clear and  there is no evidence for pulmonary edema or pleural effusion or  infiltrate. There are calcified mediastinal lymph nodes.     Impression: No evidence for active disease in the chest.     This report was finalized on 1/4/2024 5:06 PM by Dr. Lionel Rao M.D on Workstation: [id number]       Scheduled Medications  ALPRAZolam, 0.5 mg, Oral, BID  cefTRIAXone, 2,000 mg, Intravenous, Q24H  fentaNYL, 1 patch, Transdermal, Q48H   And  Check Fentanyl Patch Placement, 1 each, Does not apply, Q12H  citalopram, 40 mg, Oral, Q PM  methylPREDNISolone sodium succinate, 40 mg, Intravenous, Q8H  metroNIDAZOLE, 500 mg, Intravenous, Q8H    Infusions  pantoprazole, 8 mg/hr, Last Rate: 8 mg/hr (01/07/24 0701)    Diet  Diet: Regular/House Diet; Fluid Consistency: Thin (IDDSI 0)       Assessment/Plan     Active Hospital Problems    Diagnosis  POA    **Acute upper GI bleed [K92.2]  Yes      Resolved Hospital Problems   No resolved problems to display.       63 y.o. female admitted with Acute upper GI bleed.    Opiate dependence with withdrawal  Chronic Pain syndrome  She started experiencing diarrhea a few days after she ran out of her fentanyl patches.  She has not had diarrhea for several days now     Collagenous colitis  Procal 0.82  She is currently on Rocephin and Flagyl  Stool studies are pending  Advance diet as tolerated  No indication for endoscopic intervention at this time.    Acute blood loss anemia  FOBT positive  Likely due to chronic disease  She received 1 unit of PRBCs  No indication for intervention at this time  DC IV PPI ggt, start PO protonix BID    Chronic pain disorder  Currently on fentanyl patch and hydrocodone as outpatient, resumed inpatient      SCDs for DVT prophylaxis.  Full code.  Discussed with patient and nursing staff.  Anticipate discharge home in 1-2 days.      Tadeo Paz MD  [facility]  01/07/24  10:37 EST

## 2024-01-07 NOTE — PLAN OF CARE
Problem: Fall Injury Risk  Goal: Absence of Fall and Fall-Related Injury  1/7/2024 0336 by Lucille Adams RN  Outcome: Ongoing, Progressing  1/7/2024 0335 by Lucille Adams RN  Outcome: Ongoing, Progressing  Intervention: Identify and Manage Contributors  Recent Flowsheet Documentation  Taken 1/7/2024 0056 by Lucille Adams RN  Medication Review/Management: medications reviewed  Taken 1/6/2024 2217 by Lucille Adams RN  Medication Review/Management: medications reviewed  Taken 1/6/2024 2003 by Lucille Adams RN  Medication Review/Management: medications reviewed  Intervention: Promote Injury-Free Environment  Recent Flowsheet Documentation  Taken 1/7/2024 0056 by Lucille Adams RN  Safety Promotion/Fall Prevention:   activity supervised   assistive device/personal items within reach   clutter free environment maintained   fall prevention program maintained   lighting adjusted   nonskid shoes/slippers when out of bed   room organization consistent   safety round/check completed  Taken 1/6/2024 2217 by Lucille Adams RN  Safety Promotion/Fall Prevention:   activity supervised   assistive device/personal items within reach   clutter free environment maintained   fall prevention program maintained   lighting adjusted   nonskid shoes/slippers when out of bed   safety round/check completed   room organization consistent  Taken 1/6/2024 2003 by Lucille Adams RN  Safety Promotion/Fall Prevention:   activity supervised   assistive device/personal items within reach   clutter free environment maintained   fall prevention program maintained   lighting adjusted   nonskid shoes/slippers when out of bed   room organization consistent   safety round/check completed     Problem: Skin Injury Risk Increased  Goal: Skin Health and Integrity  1/7/2024 0336 by Lucille Adams RN  Outcome: Ongoing, Progressing  1/7/2024 0335 by Lucille Adams RN  Outcome: Ongoing, Progressing  Intervention: Optimize Skin Protection  Recent Flowsheet  Documentation  Taken 1/7/2024 0056 by Lucille Adams RN  Pressure Reduction Techniques:   frequent weight shift encouraged   weight shift assistance provided  Pressure Reduction Devices: alternating pressure pump (ADD)  Skin Protection:   adhesive use limited   incontinence pads utilized   transparent dressing maintained   tubing/devices free from skin contact  Taken 1/6/2024 2003 by Lucille Adams RN  Pressure Reduction Techniques:   frequent weight shift encouraged   weight shift assistance provided  Pressure Reduction Devices:   alternating pressure pump (ADD)   positioning supports utilized  Skin Protection:   adhesive use limited   incontinence pads utilized   transparent dressing maintained   tubing/devices free from skin contact     Problem: Adjustment to Illness (Sepsis/Septic Shock)  Goal: Optimal Coping  1/7/2024 0336 by Lucille Adams RN  Outcome: Ongoing, Progressing  1/7/2024 0335 by Lucille Adams RN  Outcome: Ongoing, Progressing  Intervention: Optimize Psychosocial Adjustment to Illness  Recent Flowsheet Documentation  Taken 1/7/2024 0056 by Lucille Adams RN  Supportive Measures:   active listening utilized   verbalization of feelings encouraged  Taken 1/6/2024 2003 by Lucille Adams RN  Supportive Measures:   active listening utilized   verbalization of feelings encouraged     Problem: Bleeding (Sepsis/Septic Shock)  Goal: Absence of Bleeding  1/7/2024 0336 by Lucille Adams RN  Outcome: Ongoing, Progressing  1/7/2024 0335 by Lucille Adams RN  Outcome: Ongoing, Progressing     Problem: Glycemic Control Impaired (Sepsis/Septic Shock)  Goal: Blood Glucose Level Within Desired Range  1/7/2024 0336 by Lucille Adams RN  Outcome: Ongoing, Progressing  1/7/2024 0335 by Lucille Adams RN  Outcome: Ongoing, Progressing     Problem: Infection Progression (Sepsis/Septic Shock)  Goal: Absence of Infection Signs and Symptoms  1/7/2024 0336 by Lucille Adams RN  Outcome: Ongoing, Progressing  1/7/2024 0335 by Angie  BOLIVAR Adkins  Outcome: Ongoing, Progressing  Intervention: Initiate Sepsis Management  Recent Flowsheet Documentation  Taken 1/7/2024 0056 by Lucille Adams RN  Infection Prevention:   hand hygiene promoted   rest/sleep promoted   single patient room provided   personal protective equipment utilized  Isolation Precautions:   contact   spore  Taken 1/6/2024 2217 by Lucille Adams RN  Infection Prevention:   hand hygiene promoted   rest/sleep promoted   single patient room provided  Isolation Precautions:   contact   spore   precautions maintained  Taken 1/6/2024 2003 by Lucille Adams RN  Infection Prevention:   hand hygiene promoted   personal protective equipment utilized   rest/sleep promoted   single patient room provided  Isolation Precautions:   contact   spore   precautions maintained  Intervention: Promote Recovery  Recent Flowsheet Documentation  Taken 1/7/2024 0056 by Lucille Adams RN  Activity Management: activity encouraged  Taken 1/6/2024 2217 by Lucille Adams RN  Activity Management: activity encouraged  Taken 1/6/2024 2003 by Lucille Adams RN  Activity Management: activity encouraged  Sleep/Rest Enhancement:   awakenings minimized   regular sleep/rest pattern promoted   relaxation techniques promoted   room darkened     Problem: Nutrition Impaired (Sepsis/Septic Shock)  Goal: Optimal Nutrition Intake  1/7/2024 0336 by Lucille Adams RN  Outcome: Ongoing, Progressing  1/7/2024 0335 by Lucille Adams RN  Outcome: Ongoing, Progressing     Problem: Pain Chronic (Persistent) (Comorbidity Management)  Goal: Acceptable Pain Control and Functional Ability  1/7/2024 0336 by Lucille Adams RN  Outcome: Ongoing, Progressing  1/7/2024 0335 by Lucille Adams RN  Outcome: Ongoing, Progressing  Intervention: Manage Persistent Pain  Recent Flowsheet Documentation  Taken 1/7/2024 0056 by Lucille Adams RN  Medication Review/Management: medications reviewed  Taken 1/6/2024 2217 by Lucille Adams RN  Medication Review/Management:  medications reviewed  Taken 1/6/2024 2003 by Lucille Adams RN  Sleep/Rest Enhancement:   awakenings minimized   regular sleep/rest pattern promoted   relaxation techniques promoted   room darkened  Medication Review/Management: medications reviewed  Intervention: Develop Pain Management Plan  Recent Flowsheet Documentation  Taken 1/6/2024 2003 by Lucille Adams RN  Pain Management Interventions:   see MAR   quiet environment facilitated  Intervention: Optimize Psychosocial Wellbeing  Recent Flowsheet Documentation  Taken 1/7/2024 0056 by Lucille Adams RN  Supportive Measures:   active listening utilized   verbalization of feelings encouraged  Taken 1/6/2024 2003 by Lucille Adams RN  Supportive Measures:   active listening utilized   verbalization of feelings encouraged     Problem: Adult Inpatient Plan of Care  Goal: Plan of Care Review  Outcome: Ongoing, Progressing  Flowsheets (Taken 1/7/2024 0336)  Progress: improving  Plan of Care Reviewed With: patient  Outcome Evaluation:   No s/sx of distress noted at this time. Rested some throughout night. Vital signs WDL.On room air   tolerated. Fall precautions maintained. Protonix drip per orders. Will cont with current plan of care.  Goal: Patient-Specific Goal (Individualized)  Outcome: Ongoing, Progressing  Goal: Absence of Hospital-Acquired Illness or Injury  Outcome: Ongoing, Progressing  Intervention: Identify and Manage Fall Risk  Recent Flowsheet Documentation  Taken 1/7/2024 0056 by Lucille Adams RN  Safety Promotion/Fall Prevention:   activity supervised   assistive device/personal items within reach   clutter free environment maintained   fall prevention program maintained   lighting adjusted   nonskid shoes/slippers when out of bed   room organization consistent   safety round/check completed  Taken 1/6/2024 2217 by Lucille Adams RN  Safety Promotion/Fall Prevention:   activity supervised   assistive device/personal items within reach   clutter free  environment maintained   fall prevention program maintained   lighting adjusted   nonskid shoes/slippers when out of bed   safety round/check completed   room organization consistent  Taken 1/6/2024 2003 by Lucille Adams RN  Safety Promotion/Fall Prevention:   activity supervised   assistive device/personal items within reach   clutter free environment maintained   fall prevention program maintained   lighting adjusted   nonskid shoes/slippers when out of bed   room organization consistent   safety round/check completed  Intervention: Prevent Skin Injury  Recent Flowsheet Documentation  Taken 1/7/2024 0056 by Lucille Adams RN  Skin Protection:   adhesive use limited   incontinence pads utilized   transparent dressing maintained   tubing/devices free from skin contact  Taken 1/6/2024 2003 by Lucille Adams RN  Body Position:   position changed independently   sitting up in bed  Skin Protection:   adhesive use limited   incontinence pads utilized   transparent dressing maintained   tubing/devices free from skin contact  Intervention: Prevent and Manage VTE (Venous Thromboembolism) Risk  Recent Flowsheet Documentation  Taken 1/7/2024 0056 by Lucille Adams RN  Activity Management: activity encouraged  Taken 1/6/2024 2217 by Lucille Adams RN  Activity Management: activity encouraged  Taken 1/6/2024 2003 by Lucille Adams RN  Activity Management: activity encouraged  Intervention: Prevent Infection  Recent Flowsheet Documentation  Taken 1/7/2024 0056 by Lucille Adams RN  Infection Prevention:   hand hygiene promoted   rest/sleep promoted   single patient room provided   personal protective equipment utilized  Taken 1/6/2024 2217 by Lucille Adams RN  Infection Prevention:   hand hygiene promoted   rest/sleep promoted   single patient room provided  Taken 1/6/2024 2003 by Lucille Adams RN  Infection Prevention:   hand hygiene promoted   personal protective equipment utilized   rest/sleep promoted   single patient room  provided  Goal: Optimal Comfort and Wellbeing  Outcome: Ongoing, Progressing  Intervention: Monitor Pain and Promote Comfort  Recent Flowsheet Documentation  Taken 1/6/2024 2003 by Lucille Adams, RN  Pain Management Interventions:   see MAR   quiet environment facilitated  Intervention: Provide Person-Centered Care  Recent Flowsheet Documentation  Taken 1/6/2024 2003 by Lucille Adams, RN  Trust Relationship/Rapport:   care explained   reassurance provided   thoughts/feelings acknowledged  Goal: Readiness for Transition of Care  Outcome: Ongoing, Progressing   Goal Outcome Evaluation:  Plan of Care Reviewed With: patient        Progress: improving  Outcome Evaluation: No s/sx of distress noted at this time. Rested some throughout night. Vital signs WDL.On room air; tolerated. Fall precautions maintained. Protonix drip per orders. Will cont with current plan of care.

## 2024-01-08 PROBLEM — J44.9 COPD (CHRONIC OBSTRUCTIVE PULMONARY DISEASE): Status: ACTIVE | Noted: 2024-01-08

## 2024-01-08 PROBLEM — M06.9 RHEUMATOID ARTHRITIS: Status: ACTIVE | Noted: 2024-01-08

## 2024-01-08 LAB
ALBUMIN SERPL-MCNC: 3.3 G/DL (ref 3.5–5.2)
ALBUMIN/GLOB SERPL: 1.9 G/DL
ALP SERPL-CCNC: 29 U/L (ref 39–117)
ALT SERPL W P-5'-P-CCNC: 33 U/L (ref 1–33)
ANION GAP SERPL CALCULATED.3IONS-SCNC: 11.8 MMOL/L (ref 5–15)
AST SERPL-CCNC: 14 U/L (ref 1–32)
BASOPHILS # BLD AUTO: 0 10*3/MM3 (ref 0–0.2)
BASOPHILS NFR BLD AUTO: 0 % (ref 0–1.5)
BILIRUB SERPL-MCNC: <0.2 MG/DL (ref 0–1.2)
BUN SERPL-MCNC: 14 MG/DL (ref 8–23)
BUN/CREAT SERPL: 22.2 (ref 7–25)
CALCIUM SPEC-SCNC: 8.1 MG/DL (ref 8.6–10.5)
CHLORIDE SERPL-SCNC: 102 MMOL/L (ref 98–107)
CO2 SERPL-SCNC: 23.2 MMOL/L (ref 22–29)
CREAT SERPL-MCNC: 0.63 MG/DL (ref 0.57–1)
DEPRECATED RDW RBC AUTO: 45.5 FL (ref 37–54)
EGFRCR SERPLBLD CKD-EPI 2021: 101.1 ML/MIN/1.73
EOSINOPHIL # BLD AUTO: 0 10*3/MM3 (ref 0–0.4)
EOSINOPHIL NFR BLD AUTO: 0 % (ref 0.3–6.2)
ERYTHROCYTE [DISTWIDTH] IN BLOOD BY AUTOMATED COUNT: 13.1 % (ref 12.3–15.4)
GLOBULIN UR ELPH-MCNC: 1.7 GM/DL
GLUCOSE SERPL-MCNC: 178 MG/DL (ref 65–99)
HCT VFR BLD AUTO: 21.3 % (ref 34–46.6)
HCT VFR BLD AUTO: 22.8 % (ref 34–46.6)
HGB BLD-MCNC: 7.2 G/DL (ref 12–15.9)
HGB BLD-MCNC: 7.2 G/DL (ref 12–15.9)
IMM GRANULOCYTES # BLD AUTO: 0.29 10*3/MM3 (ref 0–0.05)
IMM GRANULOCYTES NFR BLD AUTO: 3.8 % (ref 0–0.5)
LYMPHOCYTES # BLD AUTO: 1.22 10*3/MM3 (ref 0.7–3.1)
LYMPHOCYTES NFR BLD AUTO: 15.9 % (ref 19.6–45.3)
MCH RBC QN AUTO: 30 PG (ref 26.6–33)
MCHC RBC AUTO-ENTMCNC: 31.6 G/DL (ref 31.5–35.7)
MCV RBC AUTO: 95 FL (ref 79–97)
MONOCYTES # BLD AUTO: 0.33 10*3/MM3 (ref 0.1–0.9)
MONOCYTES NFR BLD AUTO: 4.3 % (ref 5–12)
NEUTROPHILS NFR BLD AUTO: 5.83 10*3/MM3 (ref 1.7–7)
NEUTROPHILS NFR BLD AUTO: 76 % (ref 42.7–76)
NRBC BLD AUTO-RTO: 1.6 /100 WBC (ref 0–0.2)
PLATELET # BLD AUTO: 340 10*3/MM3 (ref 140–450)
PMV BLD AUTO: 10 FL (ref 6–12)
POTASSIUM SERPL-SCNC: 3.4 MMOL/L (ref 3.5–5.2)
POTASSIUM SERPL-SCNC: 5.1 MMOL/L (ref 3.5–5.2)
PROT SERPL-MCNC: 5 G/DL (ref 6–8.5)
RBC # BLD AUTO: 2.4 10*6/MM3 (ref 3.77–5.28)
SODIUM SERPL-SCNC: 137 MMOL/L (ref 136–145)
WBC NRBC COR # BLD AUTO: 7.67 10*3/MM3 (ref 3.4–10.8)

## 2024-01-08 PROCEDURE — 25010000002 METHYLPREDNISOLONE PER 40 MG: Performed by: INTERNAL MEDICINE

## 2024-01-08 PROCEDURE — 99232 SBSQ HOSP IP/OBS MODERATE 35: CPT | Performed by: PHYSICIAN ASSISTANT

## 2024-01-08 PROCEDURE — 25010000002 CEFTRIAXONE PER 250 MG: Performed by: INTERNAL MEDICINE

## 2024-01-08 PROCEDURE — 85014 HEMATOCRIT: CPT | Performed by: NURSE PRACTITIONER

## 2024-01-08 PROCEDURE — 85025 COMPLETE CBC W/AUTO DIFF WBC: CPT | Performed by: INTERNAL MEDICINE

## 2024-01-08 PROCEDURE — 85018 HEMOGLOBIN: CPT | Performed by: NURSE PRACTITIONER

## 2024-01-08 PROCEDURE — 80053 COMPREHEN METABOLIC PANEL: CPT | Performed by: HOSPITALIST

## 2024-01-08 PROCEDURE — 25010000002 METRONIDAZOLE 500 MG/100ML SOLUTION: Performed by: INTERNAL MEDICINE

## 2024-01-08 PROCEDURE — 84132 ASSAY OF SERUM POTASSIUM: CPT | Performed by: STUDENT IN AN ORGANIZED HEALTH CARE EDUCATION/TRAINING PROGRAM

## 2024-01-08 RX ORDER — POTASSIUM CHLORIDE 750 MG/1
40 TABLET, FILM COATED, EXTENDED RELEASE ORAL EVERY 4 HOURS
Status: COMPLETED | OUTPATIENT
Start: 2024-01-08 | End: 2024-01-08

## 2024-01-08 RX ORDER — POLYETHYLENE GLYCOL 3350 17 G/17G
0.5 POWDER, FOR SOLUTION ORAL ONCE
Status: COMPLETED | OUTPATIENT
Start: 2024-01-08 | End: 2024-01-08

## 2024-01-08 RX ADMIN — METRONIDAZOLE 500 MG: 500 INJECTION, SOLUTION INTRAVENOUS at 21:03

## 2024-01-08 RX ADMIN — TIZANIDINE 4 MG: 4 TABLET ORAL at 03:20

## 2024-01-08 RX ADMIN — METRONIDAZOLE 500 MG: 500 INJECTION, SOLUTION INTRAVENOUS at 11:18

## 2024-01-08 RX ADMIN — PANTOPRAZOLE SODIUM 40 MG: 40 TABLET, DELAYED RELEASE ORAL at 17:58

## 2024-01-08 RX ADMIN — POLYETHYLENE GLYCOL 3350 0.5 BOTTLE: 17 POWDER, FOR SOLUTION ORAL at 17:59

## 2024-01-08 RX ADMIN — ALPRAZOLAM 0.5 MG: 0.5 TABLET ORAL at 21:03

## 2024-01-08 RX ADMIN — METHYLPREDNISOLONE SODIUM SUCCINATE 40 MG: 40 INJECTION INTRAMUSCULAR; INTRAVENOUS at 03:14

## 2024-01-08 RX ADMIN — CEFTRIAXONE 2000 MG: 2 INJECTION, POWDER, FOR SOLUTION INTRAMUSCULAR; INTRAVENOUS at 16:30

## 2024-01-08 RX ADMIN — METRONIDAZOLE 500 MG: 500 INJECTION, SOLUTION INTRAVENOUS at 03:14

## 2024-01-08 RX ADMIN — POTASSIUM CHLORIDE 40 MEQ: 750 TABLET, EXTENDED RELEASE ORAL at 12:56

## 2024-01-08 RX ADMIN — METHYLPREDNISOLONE SODIUM SUCCINATE 40 MG: 40 INJECTION INTRAMUSCULAR; INTRAVENOUS at 21:03

## 2024-01-08 RX ADMIN — ALPRAZOLAM 0.5 MG: 0.5 TABLET ORAL at 08:25

## 2024-01-08 RX ADMIN — HYDROCODONE BITARTRATE AND ACETAMINOPHEN 1 TABLET: 10; 325 TABLET ORAL at 17:59

## 2024-01-08 RX ADMIN — POTASSIUM CHLORIDE 40 MEQ: 750 TABLET, EXTENDED RELEASE ORAL at 08:25

## 2024-01-08 RX ADMIN — PANTOPRAZOLE SODIUM 40 MG: 40 TABLET, DELAYED RELEASE ORAL at 06:56

## 2024-01-08 RX ADMIN — TIZANIDINE 4 MG: 4 TABLET ORAL at 21:09

## 2024-01-08 RX ADMIN — METHYLPREDNISOLONE SODIUM SUCCINATE 40 MG: 40 INJECTION INTRAMUSCULAR; INTRAVENOUS at 11:18

## 2024-01-08 RX ADMIN — TIZANIDINE 4 MG: 4 TABLET ORAL at 11:18

## 2024-01-08 RX ADMIN — CITALOPRAM 40 MG: 40 TABLET, FILM COATED ORAL at 21:03

## 2024-01-08 RX ADMIN — HYDROCODONE BITARTRATE AND ACETAMINOPHEN 1 TABLET: 10; 325 TABLET ORAL at 11:18

## 2024-01-08 NOTE — PROGRESS NOTES
Name: Meme Mcginnis ADMIT: 2024   : 1960  PCP: Radu Rawls MD    MRN: [id number] LOS: 4 days   AGE/SEX: 63 y.o. female  ROOM: [facility]     Subjective   Subjective   Resting in bed.  No family at bedside.  She denies any chest pain, dyspnea, cough, fever or chills.  Denies any nausea or vomiting.  States that she has had about 3-4 bowel movements this morning that are all dark/black in nature.  States that she typically has about 3 bowel movements a week if she runs constipated.  Has some associated cramping with her diarrhea.  Denies any lightheadedness or dizziness with ambulation.  States that she has intermittent issues with getting her fentanyl patches due to supply at the pharmacy but currently has her patches available at home.     Objective   Objective   Vital Signs  Temp:  [98.4 °F (36.9 °C)-100 °F (37.8 °C)] 98.8 °F (37.1 °C)  Heart Rate:  [60-94] 66  Resp:  [16-20] 18  BP: (106-136)/(61-77) 128/64  SpO2:  [96 %-99 %] 99 %  on   ;   Device (Oxygen Therapy): room air  Body mass index is 20.73 kg/m².    Physical Exam  Vitals and nursing note reviewed.   Constitutional:       Appearance: She is ill-appearing. She is not toxic-appearing.   Cardiovascular:      Rate and Rhythm: Normal rate and regular rhythm.      Pulses: Normal pulses.   Pulmonary:      Effort: Pulmonary effort is normal. No respiratory distress.      Breath sounds: Normal breath sounds.   Abdominal:      General: Bowel sounds are normal. There is no distension.      Palpations: Abdomen is soft.      Tenderness: There is no abdominal tenderness.   Musculoskeletal:         General: No swelling. Normal range of motion.   Skin:     General: Skin is warm and dry.      Findings: No bruising.   Neurological:      General: No focal deficit present.      Mental Status: She is alert and oriented to person, place, and time.      Sensory: No sensory deficit.      Motor: Weakness present.      Coordination: Coordination normal.    Psychiatric:         Mood and Affect: Mood normal.         Behavior: Behavior normal.     Results Review:       I reviewed the patient's new clinical results.  Results from last 7 days   Lab Units 01/08/24 0531 01/07/24 1610 01/07/24  0745 01/06/24  1610 01/06/24 0341 01/05/24 0416 01/04/24  2330   WBC 10*3/mm3 7.67  --  8.64  --  6.40  --  11.95*   HEMOGLOBIN g/dL 7.2* 7.8* 7.8*  7.8* 7.7* 7.3*   < > 8.4*   PLATELETS 10*3/mm3 340  --  287  --  237  --  279    < > = values in this interval not displayed.     Results from last 7 days   Lab Units 01/08/24 0531 01/07/24 0745 01/06/24 0341 01/05/24  1910 01/05/24  0416   SODIUM mmol/L 137 137 134*  --  138   POTASSIUM mmol/L 3.4* 4.2 4.4 4.6 3.1*   CHLORIDE mmol/L 102 103 104  --  109*   CO2 mmol/L 23.2 24.6 20.5*  --  19.0*   BUN mg/dL 14 14 13  --  31*   CREATININE mg/dL 0.63 0.60 0.59  --  0.73   GLUCOSE mg/dL 178* 142* 137*  --  125*   Estimated Creatinine Clearance: 83.6 mL/min (by C-G formula based on SCr of 0.63 mg/dL).  Results from last 7 days   Lab Units 01/08/24 0531 01/07/24 0745 01/06/24 0341 01/05/24  0416   ALBUMIN g/dL 3.3* 3.7 3.4* 3.5   BILIRUBIN mg/dL <0.2 <0.2 0.2 0.2   ALK PHOS U/L 29* 34* 28* 31*   AST (SGOT) U/L 14 20 18 16   ALT (SGPT) U/L 33 36* 19 17     Results from last 7 days   Lab Units 01/08/24 0531 01/07/24  0745 01/06/24 0341 01/05/24 0416 01/04/24  1535   CALCIUM mg/dL 8.1* 8.6 8.2* 7.8* 9.1   ALBUMIN g/dL 3.3* 3.7 3.4* 3.5 4.6   MAGNESIUM mg/dL  --   --  2.0  --  2.8*   PHOSPHORUS mg/dL  --   --  2.3*  --   --      Results from last 7 days   Lab Units 01/06/24  0341 01/04/24  2330 01/04/24  2017 01/04/24  1711 01/04/24  1536 01/04/24  1535   PROCALCITONIN ng/mL 0.82*  --   --   --   --  6.14*   LACTATE mmol/L  --  1.2 2.7* 3.8* 5.9*  --      Glucose   Date/Time Value Ref Range Status   01/07/2024 1953 145 (H) 70 - 130 mg/dL Final       ALPRAZolam, 0.5 mg, Oral, BID  cefTRIAXone, 2,000 mg, Intravenous, Q24H  fentaNYL, 1  patch, Transdermal, Q48H   And  Check Fentanyl Patch Placement, 1 each, Does not apply, Q12H  citalopram, 40 mg, Oral, Q PM  methylPREDNISolone sodium succinate, 40 mg, Intravenous, Q8H  metroNIDAZOLE, 500 mg, Intravenous, Q8H  pantoprazole, 40 mg, Oral, BID AC  polyethylene glycol, 0.5 bottle, Oral, Once       Diet: Liquid Diets; Clear Liquid; Fluid Consistency: Thin (IDDSI 0)  NPO Diet NPO Type: Strict NPO       Assessment/Plan     Active Hospital Problems    Diagnosis  POA    **Acute upper GI bleed [K92.2]  Yes    Rheumatoid arthritis [M06.9]  Yes    COPD (chronic obstructive pulmonary disease) [J44.9]  Yes    Hypokalemia [E87.6]  Yes    Opioid withdrawal [F11.93]  Yes    Opioid dependence [F11.20]  Yes    Anemia [D64.9]  Yes    Abdominal pain, vomiting, and diarrhea [R10.9, R11.10, R19.7]  Yes    Chronic pain [G89.29]  Yes    DDD (degenerative disc disease), cervical [M50.30]  Yes    DDD (degenerative disc disease), lumbar [M51.36]  Yes    Depression [F32.A]  Yes    Colitis, collagenous [K52.831]  Yes      Resolved Hospital Problems   No resolved problems to display.     Ms. Mcginnis is a 63-year-old female that presented to the hospital with nausea, vomiting and diarrhea.  She reported some black stools as well and recently ran out of her fentanyl patches secondary to supply issues with her pharmacy.  She has chronic neck and back pain in the setting of rheumatoid arthritis.  He was found to have possible sepsis with EYAL and associated metabolic acidosis.  She was admitted to the ICU initially given concerns for GI bleed as well as opiate withdrawal.  He was given fluid boluses and started on empiric antibiotics.  GI was consulted and she was treated for opiate withdrawal as well as exacerbation of microscopic colitis.  She was moved out of the ICU on 1/6 which A assumed care.    Acute upper GI bleed  Abdominal pain, vomiting and diarrhea  Collagenous colitis exacerbation  -CTA abdomen and pelvis on 1/4 with  diffuse colitis extending from cecum to rectum.  Did have mild to moderate narrowing of the proximal celiac and superior mesenteric arteries with multifocal mild iliac stenoses and no evidence for aneurysm.  -Positive FOBT.  Continue to monitor H&H and transfuse as needed.  Plans to transfuse for hemoglobin less than 7.  -Continue empiric IV antibiotics.  -Plans for EGD/flexible sigmoidoscopy tomorrow with GI.  -Supportive care for symptoms.  -Currently on twice daily PPI.    EYAL  Hypokalemia  -Lactic acidosis resolved with fluids.   -Renal function improved.  Replace potassium per protocol.    Opiate withdrawal  Opiate dependence  Chronic neck and back pain  Rheumatoid arthritis  -Back on home regimen with fentanyl patch as well as as as needed hydrocodone.  -Was having issues with getting fentanyl patches due to pharmacy supply.  States she currently has some at home.    Anemia  -Secondary to GI losses.  -Serial H&H and transfuse as needed as above.    Anxiety and depression  -Continue home Celexa and Xanax.  -Appears overall stable.    Discussed with patient as well as Dr. Traylor.    VTE Prophylaxis - SCDs  Code Status - Full code  Disposition - Anticipate discharge in next 1-2 days pending scopes and hemoglobin.      BLANE Lopez  [facility]  01/08/24  13:09 EST

## 2024-01-08 NOTE — PLAN OF CARE
Problem: Adult Inpatient Plan of Care  Goal: Plan of Care Review  Flowsheets (Taken 1/8/2024 0548)  Outcome Evaluation: AOX4. Room air. Started bowel prep tonight, clear liquids and NPO at midnight. Ad marycarmen to bathroom. Call light within reach.   Goal Outcome Evaluation:              Outcome Evaluation: AOX4. Room air. Started bowel prep tonight, clear liquids and NPO at midnight. Ad marycarmen to bathroom. Call light within reach.

## 2024-01-08 NOTE — CONSULTS
Consult for removal of contact spore isolation. Patient reviewed, C diff testing order auto cancelled after 48 hours no specimen sent. No current diarrhea per chart review. Since no symptoms or active testing order okay to remove contact spore isolation, RN notified.

## 2024-01-08 NOTE — PROGRESS NOTES
[facility]  Inpatient Progress Note    Reason for Follow Up:  Colitis    Subjective     Interval History:   Pt reports she started having black, watery stools again today.   She has had 3 episodes this morning already.  Mild abd pain but no nausea/vomiting.       Current Facility-Administered Medications:     ALPRAZolam (XANAX) tablet 0.5 mg, 0.5 mg, Oral, BID, Bebeto Villegas MD, 0.5 mg at 01/08/24 0825    Calcium Replacement - Follow Nurse / BPA Driven Protocol, , Does not apply, PRN, Robby Bolanos MD    cefTRIAXone (ROCEPHIN) 2,000 mg in sodium chloride 0.9 % 100 mL IVPB-VTB, 2,000 mg, Intravenous, Q24H, Robby Bolanos MD, Last Rate: 200 mL/hr at 01/07/24 1646, 2,000 mg at 01/07/24 1646    fentaNYL (DURAGESIC) 50 MCG/HR patch 1 patch, 1 patch, Transdermal, Q48H, 1 patch at 01/07/24 0947 **AND** Check Fentanyl Patch Placement, 1 each, Does not apply, Q12H, Bebeto Villegas MD    citalopram (CeleXA) tablet 40 mg, 40 mg, Oral, Q PM, Bebeto Villegas MD, 40 mg at 01/07/24 2008    HYDROcodone-acetaminophen (NORCO)  MG per tablet 1 tablet, 1 tablet, Oral, Q6H PRN, Robby Bolanos MD, 1 tablet at 01/08/24 1118    Magnesium Standard Dose Replacement - Follow Nurse / BPA Driven Protocol, , Does not apply, PRN, Robby Bolanos MD    methylPREDNISolone sodium succinate (SOLU-Medrol) injection 40 mg, 40 mg, Intravenous, Q8H, Robby Bolanos MD, 40 mg at 01/08/24 1118    metroNIDAZOLE (FLAGYL) IVPB 500 mg, 500 mg, Intravenous, Q8H, Robby Bolanos MD, Last Rate: 100 mL/hr at 01/08/24 1118, 500 mg at 01/08/24 1118    ondansetron (ZOFRAN) injection 4 mg, 4 mg, Intravenous, Q6H PRN, Robby Bolanos MD, 4 mg at 01/05/24 0900    pantoprazole (PROTONIX) EC tablet 40 mg, 40 mg, Oral, BID AC, Tadeo Paz MD, 40 mg at 01/08/24 0656    Phosphorus Replacement - Follow Nurse / BPA Driven Protocol, , Does not apply, PRN, Robby Bolanos MD    potassium chloride (K-DUR,KLOR-CON) ER tablet 40 mEq, 40 mEq, Oral,  Q4H, Kong Traylor, DO, 40 mEq at 01/08/24 0825    Potassium Replacement - Follow Nurse / BPA Driven Protocol, , Does not apply, PRN, Polo Huang MD    Potassium Replacement - Follow Nurse / BPA Driven Protocol, , Does not apply, PRN, Robby Bolanos MD    sodium chloride 0.9 % flush 10 mL, 10 mL, Intravenous, PRN, Emergency, Triage Protocol, MD    tiZANidine (ZANAFLEX) tablet 4 mg, 4 mg, Oral, TID PRN, Ubaldo Stevens MD, 4 mg at 01/08/24 1118        Objective     Vital Signs  Temp:  [98.4 °F (36.9 °C)-100 °F (37.8 °C)] 98.8 °F (37.1 °C)  Heart Rate:  [60-94] 66  Resp:  [16-20] 18  BP: (106-136)/(61-77) 128/64  Body mass index is 20.73 kg/m².    Intake/Output Summary (Last 24 hours) at 1/8/2024 1119  Last data filed at 1/8/2024 0800  Gross per 24 hour   Intake 240 ml   Output --   Net 240 ml     I/O this shift:  In: 240 [P.O.:240]  Out: -      Physical Exam:   General: patient awake, alert and cooperative   Eyes: Normal lids and lashes, no scleral icterus   Abdomen: soft, nontender, nondistended; normal bowel sounds      Results Review:     I reviewed the patient's new clinical results.    Results from last 7 days   Lab Units 01/08/24  0531 01/07/24  1610 01/07/24  0745 01/06/24  1610 01/06/24  0341   WBC 10*3/mm3 7.67  --  8.64  --  6.40   HEMOGLOBIN g/dL 7.2* 7.8* 7.8*  7.8*   < > 7.3*   HEMATOCRIT % 22.8* 23.8* 23.5*  23.5*   < > 21.7*   PLATELETS 10*3/mm3 340  --  287  --  237    < > = values in this interval not displayed.     Results from last 7 days   Lab Units 01/08/24  0531 01/07/24  0745 01/06/24  0341   SODIUM mmol/L 137 137 134*   POTASSIUM mmol/L 3.4* 4.2 4.4   CHLORIDE mmol/L 102 103 104   CO2 mmol/L 23.2 24.6 20.5*   BUN mg/dL 14 14 13   CREATININE mg/dL 0.63 0.60 0.59   CALCIUM mg/dL 8.1* 8.6 8.2*   BILIRUBIN mg/dL <0.2 <0.2 0.2   ALK PHOS U/L 29* 34* 28*   ALT (SGPT) U/L 33 36* 19   AST (SGOT) U/L 14 20 18   GLUCOSE mg/dL 178* 142* 137*     Results from last 7 days   Lab Units 01/04/24  5357    INR  1.15*     Lab Results   Lab Value Date/Time    LIPASE 28 01/04/2024 1535    LIPASE 16 02/16/2023 1440    LIPASE 21 02/18/2022 0748    LIPASE 18 09/11/2021 1235    LIPASE 18 04/25/2021 1641       Radiology:  CT Head Without Contrast   Final Result   There is no evidence of fracture or of intracranial   hemorrhage. Further evaluation could be performed with MRI examination   of the brain as indicated.       CT CERVICAL SPINE WITHOUT CONTRAST: There is moderate loss of disc   height at C5-C6. There is no evidence of prevertebral edema or a   fracture.       C2-C3: A small central disc bulge is present.       C3-C4: A small central disc bulge is present. Mild facet degenerative   disease is present on the right.       C4-C5: A mild central disc bulge is present. Mild-to-moderate facet   degenerative disease is present on the right.       C5-C6: A mild central disc osteophyte complex is present with no   evidence of herniation.       C6-C7: A small central disc bulge is present with no evidence of   herniation.       C7-T1: There is no evidence of disc bulge or herniation.       IMPRESSION: Multilevel degenerative disease involving the cervical spine   is noted as described above. There is no evidence of fracture. See   above.               Radiation dose reduction techniques were utilized, including automated   exposure control and exposure modulation based on body size.           This report was finalized on 1/4/2024 7:41 PM by Dr. Morteza Scruggs M.D   on Workstation: [id number]          CT Cervical Spine Without Contrast   Final Result   There is no evidence of fracture or of intracranial   hemorrhage. Further evaluation could be performed with MRI examination   of the brain as indicated.       CT CERVICAL SPINE WITHOUT CONTRAST: There is moderate loss of disc   height at C5-C6. There is no evidence of prevertebral edema or a   fracture.       C2-C3: A small central disc bulge is present.       C3-C4: A small  central disc bulge is present. Mild facet degenerative   disease is present on the right.       C4-C5: A mild central disc bulge is present. Mild-to-moderate facet   degenerative disease is present on the right.       C5-C6: A mild central disc osteophyte complex is present with no   evidence of herniation.       C6-C7: A small central disc bulge is present with no evidence of   herniation.       C7-T1: There is no evidence of disc bulge or herniation.       IMPRESSION: Multilevel degenerative disease involving the cervical spine   is noted as described above. There is no evidence of fracture. See   above.               Radiation dose reduction techniques were utilized, including automated   exposure control and exposure modulation based on body size.           This report was finalized on 1/4/2024 7:41 PM by Dr. Morteza Scruggs M.D   on Workstation: [id number]          CT Angiogram Abdomen Pelvis   Final Result   1. Diffuse colitis extending from the cecum to the rectum.   2. Atherosclerotic disease. Mild-to-moderate narrowing of the proximal   celiac and superior mesenteric arteries. Multifocal mild iliac stenoses.   No evidence for aneurysm.        Radiation dose reduction techniques were utilized, including automated   exposure control and exposure modulation based on body size.           This report was finalized on 1/4/2024 7:38 PM by Dr. Lionel Rao M.D on Workstation: [id number]          XR Chest 1 View   Final Result   No evidence for active disease in the chest.       This report was finalized on 1/4/2024 5:06 PM by Dr. Lionel Rao M.D on Workstation: [id number]              Assessment & Plan     Active Hospital Problems    Diagnosis     **Acute upper GI bleed     Rheumatoid arthritis     COPD (chronic obstructive pulmonary disease)     Hypokalemia     Opioid withdrawal     Opioid dependence     Anemia     Abdominal pain, vomiting, and diarrhea     Chronic pain     DDD (degenerative disc disease),  cervical     DDD (degenerative disc disease), lumbar     Depression     Colitis, collagenous          Assessment:   Diarrhea  Diffuse colitis on CT scan- +FOBT  Hx of collagenous colitis on colonoscopy 2 years ago  EYAL- resolved  Normocytic anemia since 02/2023- normal iron studies a year ago. S/p 1 unit of PRBCs upon admission.     All problems new to me.   Plan:   Continue abx   Given anemia requiring blood transfusion, black stools, and colitis on imaging, we will plan EGD/flex sig tomorrow.  Pt to receive 1/2 of the Miralax prep  Clears for rest of the day, NPO at midnight  Further recs pending above     I discussed the patients findings and my recommendations with patient.         James Burris PA-C  [facility]  [address] [place] [address]  Office: [phone]

## 2024-01-08 NOTE — PLAN OF CARE
Problem: Fall Injury Risk  Goal: Absence of Fall and Fall-Related Injury  Outcome: Ongoing, Progressing  Intervention: Identify and Manage Contributors  Recent Flowsheet Documentation  Taken 1/8/2024 0200 by Lucille Adams RN  Medication Review/Management: medications reviewed  Taken 1/8/2024 0000 by Lucille Adams RN  Medication Review/Management: medications reviewed  Taken 1/7/2024 2213 by Lucille Adams RN  Medication Review/Management: medications reviewed  Taken 1/7/2024 2008 by Lucille Adams RN  Medication Review/Management: medications reviewed  Intervention: Promote Injury-Free Environment  Recent Flowsheet Documentation  Taken 1/8/2024 0200 by Lucille Adams RN  Safety Promotion/Fall Prevention:   activity supervised   assistive device/personal items within reach   clutter free environment maintained   fall prevention program maintained   lighting adjusted   nonskid shoes/slippers when out of bed   room organization consistent   safety round/check completed  Taken 1/8/2024 0000 by Lucille Adams RN  Safety Promotion/Fall Prevention:   activity supervised   assistive device/personal items within reach   clutter free environment maintained   fall prevention program maintained   lighting adjusted   nonskid shoes/slippers when out of bed   room organization consistent   safety round/check completed  Taken 1/7/2024 2213 by Lucille Adams RN  Safety Promotion/Fall Prevention:   activity supervised   assistive device/personal items within reach   clutter free environment maintained   fall prevention program maintained   lighting adjusted   nonskid shoes/slippers when out of bed   room organization consistent   safety round/check completed  Taken 1/7/2024 2008 by Lucille Adams RN  Safety Promotion/Fall Prevention:   activity supervised   assistive device/personal items within reach   clutter free environment maintained   fall prevention program maintained   lighting adjusted   nonskid shoes/slippers when out of bed    room organization consistent   safety round/check completed     Problem: Skin Injury Risk Increased  Goal: Skin Health and Integrity  Outcome: Ongoing, Progressing  Intervention: Optimize Skin Protection  Recent Flowsheet Documentation  Taken 1/7/2024 2008 by Lucille Adams RN  Pressure Reduction Techniques:   frequent weight shift encouraged   weight shift assistance provided   pressure points protected  Pressure Reduction Devices: alternating pressure pump (ADD)  Skin Protection:   adhesive use limited   incontinence pads utilized   transparent dressing maintained   tubing/devices free from skin contact     Problem: Adjustment to Illness (Sepsis/Septic Shock)  Goal: Optimal Coping  Outcome: Ongoing, Progressing  Intervention: Optimize Psychosocial Adjustment to Illness  Recent Flowsheet Documentation  Taken 1/8/2024 0000 by Lucille Adams RN  Supportive Measures:   active listening utilized   verbalization of feelings encouraged  Taken 1/7/2024 2008 by Lcuille Adams RN  Supportive Measures:   active listening utilized   verbalization of feelings encouraged     Problem: Bleeding (Sepsis/Septic Shock)  Goal: Absence of Bleeding  Outcome: Ongoing, Progressing     Problem: Glycemic Control Impaired (Sepsis/Septic Shock)  Goal: Blood Glucose Level Within Desired Range  Outcome: Ongoing, Progressing     Problem: Infection Progression (Sepsis/Septic Shock)  Goal: Absence of Infection Signs and Symptoms  Outcome: Ongoing, Progressing  Intervention: Initiate Sepsis Management  Recent Flowsheet Documentation  Taken 1/8/2024 0200 by Lucille Adams RN  Infection Prevention:   hand hygiene promoted   rest/sleep promoted   single patient room provided  Taken 1/8/2024 0000 by Lucille Adams RN  Infection Prevention:   hand hygiene promoted   rest/sleep promoted   single patient room provided   personal protective equipment utilized  Isolation Precautions:   contact   spore  Taken 1/7/2024 2213 by Lucille Adams RN  Infection  Prevention:   hand hygiene promoted   personal protective equipment utilized   rest/sleep promoted   single patient room provided  Isolation Precautions:   contact   spore   precautions maintained  Taken 1/7/2024 2008 by Lucille Adams RN  Infection Prevention:   hand hygiene promoted   personal protective equipment utilized   rest/sleep promoted   single patient room provided  Isolation Precautions:   contact   spore  Intervention: Promote Recovery  Recent Flowsheet Documentation  Taken 1/8/2024 0200 by Lucille Adams RN  Activity Management: activity encouraged  Taken 1/8/2024 0000 by Lucille Adams RN  Activity Management: activity encouraged  Taken 1/7/2024 2213 by Lucille Adams RN  Activity Management: activity encouraged  Taken 1/7/2024 2008 by Lucille Adams RN  Activity Management: activity encouraged  Sleep/Rest Enhancement:   awakenings minimized   regular sleep/rest pattern promoted   relaxation techniques promoted   room darkened     Problem: Nutrition Impaired (Sepsis/Septic Shock)  Goal: Optimal Nutrition Intake  Outcome: Ongoing, Progressing     Problem: Pain Chronic (Persistent) (Comorbidity Management)  Goal: Acceptable Pain Control and Functional Ability  Outcome: Ongoing, Progressing  Intervention: Manage Persistent Pain  Recent Flowsheet Documentation  Taken 1/8/2024 0200 by Lcuille Adams RN  Medication Review/Management: medications reviewed  Taken 1/8/2024 0000 by Lucille Adams RN  Medication Review/Management: medications reviewed  Taken 1/7/2024 2213 by Lucille Adams RN  Medication Review/Management: medications reviewed  Taken 1/7/2024 2008 by Lucille Adams RN  Sleep/Rest Enhancement:   awakenings minimized   regular sleep/rest pattern promoted   relaxation techniques promoted   room darkened  Medication Review/Management: medications reviewed  Intervention: Optimize Psychosocial Wellbeing  Recent Flowsheet Documentation  Taken 1/8/2024 0000 by Lucille Adams RN  Supportive Measures:   active  listening utilized   verbalization of feelings encouraged  Taken 1/7/2024 2008 by Lucille Adams, RN  Supportive Measures:   active listening utilized   verbalization of feelings encouraged     Problem: Adult Inpatient Plan of Care  Goal: Plan of Care Review  Outcome: Ongoing, Progressing  Flowsheets (Taken 1/8/2024 0325)  Progress: improving  Plan of Care Reviewed With: patient  Outcome Evaluation:   No s/sx of distress noted at this time. Rested some throughout night. Vital signs WDL. On room air   tolerated. Fall precautions maintained. Will cont with current plan of care.  Goal: Patient-Specific Goal (Individualized)  Outcome: Ongoing, Progressing  Goal: Absence of Hospital-Acquired Illness or Injury  Outcome: Ongoing, Progressing  Intervention: Identify and Manage Fall Risk  Recent Flowsheet Documentation  Taken 1/8/2024 0200 by Lucille Adams RN  Safety Promotion/Fall Prevention:   activity supervised   assistive device/personal items within reach   clutter free environment maintained   fall prevention program maintained   lighting adjusted   nonskid shoes/slippers when out of bed   room organization consistent   safety round/check completed  Taken 1/8/2024 0000 by Lucille Adams RN  Safety Promotion/Fall Prevention:   activity supervised   assistive device/personal items within reach   clutter free environment maintained   fall prevention program maintained   lighting adjusted   nonskid shoes/slippers when out of bed   room organization consistent   safety round/check completed  Taken 1/7/2024 2213 by Lucille Adams, RN  Safety Promotion/Fall Prevention:   activity supervised   assistive device/personal items within reach   clutter free environment maintained   fall prevention program maintained   lighting adjusted   nonskid shoes/slippers when out of bed   room organization consistent   safety round/check completed  Taken 1/7/2024 2008 by Lucille Adams, RN  Safety Promotion/Fall Prevention:   activity supervised    assistive device/personal items within reach   clutter free environment maintained   fall prevention program maintained   lighting adjusted   nonskid shoes/slippers when out of bed   room organization consistent   safety round/check completed  Intervention: Prevent Skin Injury  Recent Flowsheet Documentation  Taken 1/8/2024 0000 by Lucille Adams RN  Body Position: position changed independently  Taken 1/7/2024 2213 by Lucille Adams RN  Body Position: position changed independently  Taken 1/7/2024 2008 by Lucille Adams RN  Body Position:   position changed independently   sitting up in bed  Skin Protection:   adhesive use limited   incontinence pads utilized   transparent dressing maintained   tubing/devices free from skin contact  Intervention: Prevent and Manage VTE (Venous Thromboembolism) Risk  Recent Flowsheet Documentation  Taken 1/8/2024 0200 by Lucille Adams RN  Activity Management: activity encouraged  Taken 1/8/2024 0000 by Lucille Adams RN  Activity Management: activity encouraged  Taken 1/7/2024 2213 by Lucille Adams RN  Activity Management: activity encouraged  Taken 1/7/2024 2008 by Lucille Adams RN  Activity Management: activity encouraged  Intervention: Prevent Infection  Recent Flowsheet Documentation  Taken 1/8/2024 0200 by Lucille Adams RN  Infection Prevention:   hand hygiene promoted   rest/sleep promoted   single patient room provided  Taken 1/8/2024 0000 by Lucille Adams RN  Infection Prevention:   hand hygiene promoted   rest/sleep promoted   single patient room provided   personal protective equipment utilized  Taken 1/7/2024 2213 by Lucille Adams RN  Infection Prevention:   hand hygiene promoted   personal protective equipment utilized   rest/sleep promoted   single patient room provided  Taken 1/7/2024 2008 by Lucille Adams RN  Infection Prevention:   hand hygiene promoted   personal protective equipment utilized   rest/sleep promoted   single patient room provided  Goal: Optimal Comfort  and Wellbeing  Outcome: Ongoing, Progressing  Intervention: Provide Person-Centered Care  Recent Flowsheet Documentation  Taken 1/7/2024 2008 by Lucille Adams RN  Trust Relationship/Rapport:   care explained   reassurance provided   thoughts/feelings acknowledged  Goal: Readiness for Transition of Care  Outcome: Ongoing, Progressing   Goal Outcome Evaluation:  Plan of Care Reviewed With: patient        Progress: improving  Outcome Evaluation: No s/sx of distress noted at this time. Rested some throughout night. Vital signs WDL. On room air; tolerated. Fall precautions maintained. Will cont with current plan of care.

## 2024-01-09 ENCOUNTER — ANESTHESIA EVENT (OUTPATIENT)
Dept: GASTROENTEROLOGY | Facility: HOSPITAL | Age: 64
End: 2024-01-09
Payer: MEDICARE

## 2024-01-09 ENCOUNTER — ANESTHESIA (OUTPATIENT)
Dept: GASTROENTEROLOGY | Facility: HOSPITAL | Age: 64
End: 2024-01-09
Payer: MEDICARE

## 2024-01-09 LAB
ALBUMIN SERPL-MCNC: 4 G/DL (ref 3.5–5.2)
ALBUMIN/GLOB SERPL: 2.4 G/DL
ALP SERPL-CCNC: 34 U/L (ref 39–117)
ALT SERPL W P-5'-P-CCNC: 36 U/L (ref 1–33)
ANION GAP SERPL CALCULATED.3IONS-SCNC: 11.1 MMOL/L (ref 5–15)
AST SERPL-CCNC: 16 U/L (ref 1–32)
BACTERIA SPEC AEROBE CULT: NORMAL
BACTERIA SPEC AEROBE CULT: NORMAL
BILIRUB SERPL-MCNC: <0.2 MG/DL (ref 0–1.2)
BUN SERPL-MCNC: 10 MG/DL (ref 8–23)
BUN/CREAT SERPL: 16.9 (ref 7–25)
CALCIUM SPEC-SCNC: 8.9 MG/DL (ref 8.6–10.5)
CHLORIDE SERPL-SCNC: 105 MMOL/L (ref 98–107)
CO2 SERPL-SCNC: 25.9 MMOL/L (ref 22–29)
CREAT SERPL-MCNC: 0.59 MG/DL (ref 0.57–1)
DEPRECATED RDW RBC AUTO: 44.6 FL (ref 37–54)
EGFRCR SERPLBLD CKD-EPI 2021: 102.7 ML/MIN/1.73
ERYTHROCYTE [DISTWIDTH] IN BLOOD BY AUTOMATED COUNT: 13.4 % (ref 12.3–15.4)
GLOBULIN UR ELPH-MCNC: 1.7 GM/DL
GLUCOSE SERPL-MCNC: 113 MG/DL (ref 65–99)
HBA1C MFR BLD: 5.2 % (ref 4.8–5.6)
HCT VFR BLD AUTO: 26.4 % (ref 34–46.6)
HGB BLD-MCNC: 8.7 G/DL (ref 12–15.9)
LYMPHOCYTES # BLD MANUAL: 1.34 10*3/MM3 (ref 0.7–3.1)
LYMPHOCYTES NFR BLD MANUAL: 5 % (ref 5–12)
MCH RBC QN AUTO: 31.5 PG (ref 26.6–33)
MCHC RBC AUTO-ENTMCNC: 33 G/DL (ref 31.5–35.7)
MCV RBC AUTO: 95.7 FL (ref 79–97)
METAMYELOCYTES NFR BLD MANUAL: 1 % (ref 0–0)
MONOCYTES # BLD: 0.61 10*3/MM3 (ref 0.1–0.9)
NEUTROPHILS # BLD AUTO: 10.09 10*3/MM3 (ref 1.7–7)
NEUTROPHILS NFR BLD MANUAL: 83 % (ref 42.7–76)
NRBC BLD AUTO-RTO: 2.6 /100 WBC (ref 0–0.2)
NRBC SPEC MANUAL: 2 /100 WBC (ref 0–0.2)
PLAT MORPH BLD: NORMAL
PLATELET # BLD AUTO: 468 10*3/MM3 (ref 140–450)
PMV BLD AUTO: 10 FL (ref 6–12)
POTASSIUM SERPL-SCNC: 4.5 MMOL/L (ref 3.5–5.2)
PROT SERPL-MCNC: 5.7 G/DL (ref 6–8.5)
RBC # BLD AUTO: 2.76 10*6/MM3 (ref 3.77–5.28)
RBC MORPH BLD: NORMAL
SODIUM SERPL-SCNC: 142 MMOL/L (ref 136–145)
VARIANT LYMPHS NFR BLD MANUAL: 1 % (ref 0–5)
VARIANT LYMPHS NFR BLD MANUAL: 10 % (ref 19.6–45.3)
WBC MORPH BLD: NORMAL
WBC NRBC COR # BLD AUTO: 12.16 10*3/MM3 (ref 3.4–10.8)

## 2024-01-09 PROCEDURE — 43239 EGD BIOPSY SINGLE/MULTIPLE: CPT | Performed by: INTERNAL MEDICINE

## 2024-01-09 PROCEDURE — 85007 BL SMEAR W/DIFF WBC COUNT: CPT | Performed by: INTERNAL MEDICINE

## 2024-01-09 PROCEDURE — 25010000002 CEFTRIAXONE PER 250 MG: Performed by: NURSE PRACTITIONER

## 2024-01-09 PROCEDURE — 83036 HEMOGLOBIN GLYCOSYLATED A1C: CPT | Performed by: STUDENT IN AN ORGANIZED HEALTH CARE EDUCATION/TRAINING PROGRAM

## 2024-01-09 PROCEDURE — 25010000002 METRONIDAZOLE 500 MG/100ML SOLUTION: Performed by: INTERNAL MEDICINE

## 2024-01-09 PROCEDURE — 0DB58ZX EXCISION OF ESOPHAGUS, VIA NATURAL OR ARTIFICIAL OPENING ENDOSCOPIC, DIAGNOSTIC: ICD-10-PCS | Performed by: INTERNAL MEDICINE

## 2024-01-09 PROCEDURE — 85025 COMPLETE CBC W/AUTO DIFF WBC: CPT | Performed by: INTERNAL MEDICINE

## 2024-01-09 PROCEDURE — 45331 SIGMOIDOSCOPY AND BIOPSY: CPT | Performed by: INTERNAL MEDICINE

## 2024-01-09 PROCEDURE — 0DBN8ZX EXCISION OF SIGMOID COLON, VIA NATURAL OR ARTIFICIAL OPENING ENDOSCOPIC, DIAGNOSTIC: ICD-10-PCS | Performed by: INTERNAL MEDICINE

## 2024-01-09 PROCEDURE — 25010000002 METHYLPREDNISOLONE PER 40 MG: Performed by: INTERNAL MEDICINE

## 2024-01-09 PROCEDURE — 80053 COMPREHEN METABOLIC PANEL: CPT | Performed by: NURSE PRACTITIONER

## 2024-01-09 PROCEDURE — 25810000003 LACTATED RINGERS PER 1000 ML: Performed by: INTERNAL MEDICINE

## 2024-01-09 PROCEDURE — 25010000002 PROPOFOL 10 MG/ML EMULSION: Performed by: ANESTHESIOLOGY

## 2024-01-09 PROCEDURE — 0DB68ZX EXCISION OF STOMACH, VIA NATURAL OR ARTIFICIAL OPENING ENDOSCOPIC, DIAGNOSTIC: ICD-10-PCS | Performed by: INTERNAL MEDICINE

## 2024-01-09 PROCEDURE — 88305 TISSUE EXAM BY PATHOLOGIST: CPT | Performed by: INTERNAL MEDICINE

## 2024-01-09 RX ORDER — PROPOFOL 10 MG/ML
VIAL (ML) INTRAVENOUS AS NEEDED
Status: DISCONTINUED | OUTPATIENT
Start: 2024-01-09 | End: 2024-01-09 | Stop reason: SURG

## 2024-01-09 RX ORDER — FENTANYL 50 UG/1
1 PATCH TRANSDERMAL
Status: DISCONTINUED | OUTPATIENT
Start: 2024-01-11 | End: 2024-01-10 | Stop reason: HOSPADM

## 2024-01-09 RX ORDER — LIDOCAINE HYDROCHLORIDE 20 MG/ML
INJECTION, SOLUTION INFILTRATION; PERINEURAL AS NEEDED
Status: DISCONTINUED | OUTPATIENT
Start: 2024-01-09 | End: 2024-01-09 | Stop reason: SURG

## 2024-01-09 RX ORDER — SODIUM CHLORIDE 0.9 % (FLUSH) 0.9 %
10 SYRINGE (ML) INJECTION EVERY 12 HOURS SCHEDULED
Status: DISCONTINUED | OUTPATIENT
Start: 2024-01-09 | End: 2024-01-09

## 2024-01-09 RX ORDER — SODIUM CHLORIDE, SODIUM LACTATE, POTASSIUM CHLORIDE, CALCIUM CHLORIDE 600; 310; 30; 20 MG/100ML; MG/100ML; MG/100ML; MG/100ML
30 INJECTION, SOLUTION INTRAVENOUS CONTINUOUS PRN
Status: DISCONTINUED | OUTPATIENT
Start: 2024-01-09 | End: 2024-01-10 | Stop reason: HOSPADM

## 2024-01-09 RX ADMIN — METHYLPREDNISOLONE SODIUM SUCCINATE 40 MG: 40 INJECTION INTRAMUSCULAR; INTRAVENOUS at 20:03

## 2024-01-09 RX ADMIN — CEFTRIAXONE 2000 MG: 2 INJECTION, POWDER, FOR SOLUTION INTRAMUSCULAR; INTRAVENOUS at 17:16

## 2024-01-09 RX ADMIN — TIZANIDINE 4 MG: 4 TABLET ORAL at 23:29

## 2024-01-09 RX ADMIN — LIDOCAINE HYDROCHLORIDE 60 MG: 20 INJECTION, SOLUTION INFILTRATION; PERINEURAL at 12:23

## 2024-01-09 RX ADMIN — METHYLPREDNISOLONE SODIUM SUCCINATE 40 MG: 40 INJECTION INTRAMUSCULAR; INTRAVENOUS at 13:46

## 2024-01-09 RX ADMIN — SODIUM CHLORIDE, POTASSIUM CHLORIDE, SODIUM LACTATE AND CALCIUM CHLORIDE 30 ML/HR: 600; 310; 30; 20 INJECTION, SOLUTION INTRAVENOUS at 11:15

## 2024-01-09 RX ADMIN — METHYLPREDNISOLONE SODIUM SUCCINATE 40 MG: 40 INJECTION INTRAMUSCULAR; INTRAVENOUS at 05:19

## 2024-01-09 RX ADMIN — ALPRAZOLAM 0.5 MG: 0.5 TABLET ORAL at 20:03

## 2024-01-09 RX ADMIN — PROPOFOL 70 MG: 10 INJECTION, EMULSION INTRAVENOUS at 12:22

## 2024-01-09 RX ADMIN — METRONIDAZOLE 500 MG: 500 INJECTION, SOLUTION INTRAVENOUS at 05:19

## 2024-01-09 RX ADMIN — TIZANIDINE 4 MG: 4 TABLET ORAL at 15:05

## 2024-01-09 RX ADMIN — CITALOPRAM 40 MG: 40 TABLET, FILM COATED ORAL at 20:03

## 2024-01-09 RX ADMIN — ALPRAZOLAM 0.5 MG: 0.5 TABLET ORAL at 08:44

## 2024-01-09 RX ADMIN — TIZANIDINE 4 MG: 4 TABLET ORAL at 05:20

## 2024-01-09 RX ADMIN — HYDROCODONE BITARTRATE AND ACETAMINOPHEN 1 TABLET: 10; 325 TABLET ORAL at 15:05

## 2024-01-09 RX ADMIN — METRONIDAZOLE 500 MG: 500 INJECTION, SOLUTION INTRAVENOUS at 20:03

## 2024-01-09 RX ADMIN — HYDROCODONE BITARTRATE AND ACETAMINOPHEN 1 TABLET: 10; 325 TABLET ORAL at 21:17

## 2024-01-09 RX ADMIN — HYDROCODONE BITARTRATE AND ACETAMINOPHEN 1 TABLET: 10; 325 TABLET ORAL at 05:19

## 2024-01-09 RX ADMIN — PANTOPRAZOLE SODIUM 40 MG: 40 TABLET, DELAYED RELEASE ORAL at 17:16

## 2024-01-09 RX ADMIN — METRONIDAZOLE 500 MG: 500 INJECTION, SOLUTION INTRAVENOUS at 13:46

## 2024-01-09 RX ADMIN — FENTANYL 1 PATCH: 50 PATCH, EXTENDED RELEASE TRANSDERMAL at 10:21

## 2024-01-09 RX ADMIN — PROPOFOL 140 MCG/KG/MIN: 10 INJECTION, EMULSION INTRAVENOUS at 12:23

## 2024-01-09 NOTE — CASE MANAGEMENT/SOCIAL WORK
Discharge Planning Assessment  [facility]     Patient Name: Meme Mcginnis  MRN: [id number]  Today's Date: 1/9/2024    Admit Date: 1/4/2024    Plan: Home with family   Discharge Needs Assessment       Row Name 01/09/24 0916       Living Environment    People in Home child(gillian), adult;grandchild(gillian)    Name(s) of People in Home Elli and her 16 and 4 y/o children    Current Living Arrangements home    Potentially Unsafe Housing Conditions none    Primary Care Provided by self    Family Caregiver if Needed child(gillian), adult    Quality of Family Relationships involved;helpful    Able to Return to Prior Arrangements yes       Resource/Environmental Concerns    Resource/Environmental Concerns none       Transition Planning    Patient/Family Anticipates Transition to home with family    Patient/Family Anticipated Services at Transition none    Transportation Anticipated family or friend will provide       Discharge Needs Assessment    Readmission Within the Last 30 Days no previous admission in last 30 days    Equipment Currently Used at Home none    Concerns to be Addressed no discharge needs identified    Anticipated Changes Related to Illness none    Equipment Needed After Discharge none                   Discharge Plan       Row Name 01/09/24 0916       Plan    Plan Home with family    Patient/Family in Agreement with Plan yes    Plan Comments Spoke to pt at bedside, introduced self and explained CCP role, face sheet and pharmacy information verified. Pt lives with her daughter Elli and grandchildren ages 16 and 5. Her living space is in the basement and has 12 steps. 3 no steps to enter home, she states she is IADL's and often helps take care of 4 y/o grandchild.  No HH or SNF history and she uses no medical equipment. She plans home with [facility] to transport, no anticipated needs. CCP will follow- Stefanie GONCALVES                  Continued Care and Services - Admitted Since 1/4/2024    Coordination has not been started  for this encounter.       Expected Discharge Date and Time       Expected Discharge Date Expected Discharge Time    Jan 12, 2024            Demographic Summary       Row Name 01/09/24 0915       General Information    Admission Type inpatient                   Functional Status       Row Name 01/09/24 0915       Functional Status    Usual Activity Tolerance excellent    Current Activity Tolerance good       Assessment of Health Literacy    Health Literacy Good       Functional Status, IADL    Medications independent    Meal Preparation independent    Housekeeping independent    Laundry independent    Shopping independent       Mental Status    General Appearance WDL WDL       Mental Status Summary    Recent Changes in Mental Status/Cognitive Functioning no changes                   Psychosocial    No documentation.                  Abuse/Neglect    No documentation.                  Legal       Row Name 01/09/24 0915       Financial/Legal    Who Manages Finances if Patient Unable dtrs                   Substance Abuse    No documentation.                  Patient Forms    No documentation.                     Stefanie Morejon RN

## 2024-01-09 NOTE — ANESTHESIA PREPROCEDURE EVALUATION
Anesthesia Evaluation     no history of anesthetic complications:                Airway   Mallampati: I  TM distance: >3 FB  Neck ROM: full  Dental      Pulmonary    (+) a smoker, COPD,  (-) asthma, shortness of breath, recent URI, no home oxygen  Cardiovascular     (+) hyperlipidemia  (-) dysrhythmias, angina      Neuro/Psych  (-) seizures  GI/Hepatic/Renal/Endo    (+) GI bleeding   (-) no renal disease, diabetes    Musculoskeletal     (+) neck pain  Abdominal    Substance History      OB/GYN          Other   arthritis,                 Anesthesia Plan    ASA 2     MAC     intravenous induction     Anesthetic plan, risks, benefits, and alternatives have been provided, discussed and informed consent has been obtained with: patient.    CODE STATUS:    Code Status (Patient has no pulse and is not breathing): CPR (Attempt to Resuscitate)  Medical Interventions (Patient has pulse or is breathing): Full Support

## 2024-01-09 NOTE — PROGRESS NOTES
Name: Meme Mcginnis ADMIT: 2024   : 1960  PCP: Radu Rawls MD    MRN: [id number] LOS: 5 days   AGE/SEX: 63 y.o. female  ROOM: ENDO/ENDO     Subjective   Subjective   Resting in bed.  Just got enemas and attempts to get her clear.  Still having some dark liquidy stool.  She denies any abdominal pain other than some cramping.  Denies any nausea or vomiting.  Denies any chest pain or trouble breathing.     Objective   Objective   Vital Signs  Temp:  [97.5 °F (36.4 °C)-98.2 °F (36.8 °C)] 98.2 °F (36.8 °C)  Heart Rate:  [59-70] 65  Resp:  [14-18] 14  BP: ()/(63-73) 125/64  SpO2:  [94 %-100 %] 100 %  on  Flow (L/min):  [8] 8;   Device (Oxygen Therapy): nonrebreather mask  Body mass index is 20.73 kg/m².    Physical Exam  Vitals and nursing note reviewed.   Constitutional:       Appearance: She is ill-appearing. She is not toxic-appearing.   Cardiovascular:      Rate and Rhythm: Normal rate and regular rhythm.      Pulses: Normal pulses.   Pulmonary:      Effort: Pulmonary effort is normal. No respiratory distress.      Breath sounds: Normal breath sounds.   Abdominal:      General: Bowel sounds are normal. There is no distension.      Palpations: Abdomen is soft.      Tenderness: There is no abdominal tenderness.   Musculoskeletal:         General: No swelling. Normal range of motion.   Skin:     General: Skin is warm and dry.      Findings: No bruising.   Neurological:      General: No focal deficit present.      Mental Status: She is alert and oriented to person, place, and time.      Sensory: No sensory deficit.      Motor: Weakness present.      Coordination: Coordination normal.   Psychiatric:         Mood and Affect: Mood normal.         Behavior: Behavior normal.     Results Review:       I reviewed the patient's new clinical results.  Results from last 7 days   Lab Units 24  0344 24  1304 24  0531 24  1610 24  0745 24  1610 24  0341   WBC  10*3/mm3 12.16*  --  7.67  --  8.64  --  6.40   HEMOGLOBIN g/dL 8.7* 7.2* 7.2* 7.8* 7.8*  7.8*   < > 7.3*   PLATELETS 10*3/mm3 468*  --  340  --  287  --  237    < > = values in this interval not displayed.     Results from last 7 days   Lab Units 01/09/24  0343 01/08/24  1623 01/08/24  0531 01/07/24  0745 01/06/24  0341   SODIUM mmol/L 142  --  137 137 134*   POTASSIUM mmol/L 4.5 5.1 3.4* 4.2 4.4   CHLORIDE mmol/L 105  --  102 103 104   CO2 mmol/L 25.9  --  23.2 24.6 20.5*   BUN mg/dL 10  --  14 14 13   CREATININE mg/dL 0.59  --  0.63 0.60 0.59   GLUCOSE mg/dL 113*  --  178* 142* 137*   Estimated Creatinine Clearance: 89.3 mL/min (by C-G formula based on SCr of 0.59 mg/dL).  Results from last 7 days   Lab Units 01/09/24  0343 01/08/24  0531 01/07/24  0745 01/06/24  0341   ALBUMIN g/dL 4.0 3.3* 3.7 3.4*   BILIRUBIN mg/dL <0.2 <0.2 <0.2 0.2   ALK PHOS U/L 34* 29* 34* 28*   AST (SGOT) U/L 16 14 20 18   ALT (SGPT) U/L 36* 33 36* 19     Results from last 7 days   Lab Units 01/09/24  0343 01/08/24  0531 01/07/24  0745 01/06/24  0341 01/05/24  0416 01/04/24  1535   CALCIUM mg/dL 8.9 8.1* 8.6 8.2*   < > 9.1   ALBUMIN g/dL 4.0 3.3* 3.7 3.4*   < > 4.6   MAGNESIUM mg/dL  --   --   --  2.0  --  2.8*   PHOSPHORUS mg/dL  --   --   --  2.3*  --   --     < > = values in this interval not displayed.     Results from last 7 days   Lab Units 01/06/24  0341 01/04/24  2330 01/04/24  2017 01/04/24  1711 01/04/24  1536 01/04/24  1535   PROCALCITONIN ng/mL 0.82*  --   --   --   --  6.14*   LACTATE mmol/L  --  1.2 2.7* 3.8* 5.9*  --      Hemoglobin A1C   Date/Time Value Ref Range Status   01/09/2024 0344 5.20 4.80 - 5.60 % Final     Glucose   Date/Time Value Ref Range Status   01/07/2024 1953 145 (H) 70 - 130 mg/dL Final       ALPRAZolam, 0.5 mg, Oral, BID  cefTRIAXone, 2,000 mg, Intravenous, Q24H  fentaNYL, 1 patch, Transdermal, Q48H   And  Check Fentanyl Patch Placement, 1 each, Does not apply, Q12H  citalopram, 40 mg, Oral, Q  PM  methylPREDNISolone sodium succinate, 40 mg, Intravenous, Q8H  metroNIDAZOLE, 500 mg, Intravenous, Q8H  pantoprazole, 40 mg, Oral, BID AC  sodium chloride, 10 mL, Intravenous, Q12H      lactated ringers, 30 mL/hr, Last Rate: 125 mL/hr (01/09/24 1219)    NPO Diet NPO Type: Strict NPO       Assessment/Plan     Active Hospital Problems    Diagnosis  POA    **Acute upper GI bleed [K92.2]  Yes    Rheumatoid arthritis [M06.9]  Yes    COPD (chronic obstructive pulmonary disease) [J44.9]  Yes    Hypokalemia [E87.6]  Yes    Opioid withdrawal [F11.93]  Yes    Opioid dependence [F11.20]  Yes    Anemia [D64.9]  Yes    Abdominal pain, vomiting, and diarrhea [R10.9, R11.10, R19.7]  Yes    Chronic pain [G89.29]  Yes    DDD (degenerative disc disease), cervical [M50.30]  Yes    DDD (degenerative disc disease), lumbar [M51.36]  Yes    Depression [F32.A]  Yes    Colitis, collagenous [K52.831]  Yes      Resolved Hospital Problems   No resolved problems to display.     Ms. Mcginnis is a 63-year-old female that presented to the hospital with nausea, vomiting and diarrhea. She reported some black stools as well and recently ran out of her fentanyl patches secondary to supply issues with her pharmacy.  She has chronic neck and back pain in the setting of rheumatoid arthritis.  He was found to have possible sepsis with EYAL and associated metabolic acidosis.  She was admitted to the ICU initially given concerns for GI bleed as well as opiate withdrawal.  She was given fluid boluses and started on empiric antibiotics.  GI was consulted and she was treated for opiate withdrawal as well as exacerbation of microscopic colitis.  She was moved out of the ICU on 1/6 which A assumed care.     Acute upper GI bleed  Abdominal pain, vomiting and diarrhea  Collagenous colitis exacerbation  -CTA abdomen and pelvis on 1/4 with diffuse colitis extending from cecum to rectum.  Did have mild to moderate narrowing of the proximal celiac and superior  mesenteric arteries with multifocal mild iliac stenoses and no evidence for aneurysm.  -Positive FOBT.  Continue to monitor H&H and transfuse as needed.  Plans to transfuse for hemoglobin less than 7. Last hemoglobin 8.7.  -Continue empiric IV antibiotics.  -Plans for EGD/flexible sigmoidoscopy today.   -Supportive care for symptoms.  -Currently on twice daily PPI.     EYAL  Hypokalemia  -Lactic acidosis resolved with fluids.   -Renal function improved.  Replace potassium per protocol.     Opiate withdrawal  Opiate dependence  Chronic neck and back pain  Rheumatoid arthritis  -Back on home regimen with fentanyl patch as well as as as needed hydrocodone.  -Was having issues with getting fentanyl patches due to pharmacy supply.  States she currently has some at home.     Anemia  -Secondary to GI losses.  -Serial H&H and transfuse as needed as above.     Anxiety and depression  -Continue home Celexa and Xanax.  -Appears overall stable.     WBC up to 12 K today.  Could possibly be reactive from bowel prep overnight.  No fevers or new infectious complaints.  Repeat labs in the a.m.    Discussed with patient, nurse and Dr. Traylor.     VTE Prophylaxis - SCDs  Code Status - Full code  Disposition - Anticipate discharge tomorrow pending scopes and hemoglobin.       BLANE Lopez  [facility]  01/09/24  12:49 EST    Addendum: EGD with white nummular lesions in the esophageal mucosa that were biopsied, erosive gastropathy with no bleeding and no stigmata of recent bleeding as well as nonbleeding duodenal ulcer with no stigmata of bleeding.  Plans to continue PPI and avoid NSAIDs.  Flexible sigmoidoscopy with entire examined colon found to be normal.  Biopsies were taken with cold forceps from the left colon for evaluation of microscopic colitis.

## 2024-01-09 NOTE — ANESTHESIA POSTPROCEDURE EVALUATION
"Patient: Meme Mcginnis    Procedure Summary       Date: 01/09/24 Room / Location: [facility] ENDOSCOPY 10 / [facility] ENDOSCOPY    Anesthesia Start: 1218 Anesthesia Stop: 1241    Procedures:       COLONOSCOPY to transverse colon withcold biopsies      ESOPHAGOGASTRODUODENOSCOPY with cold biopsies (Esophagus) Diagnosis:       Anemia, unspecified type      (Anemia, unspecified type [D64.9])    Surgeons: Selvin Cunha MD Provider: Myesha Ramirez MD    Anesthesia Type: MAC ASA Status: 2            Anesthesia Type: MAC    Vitals  Vitals Value Taken Time   /69 01/09/24 1251   Temp     Pulse 67 01/09/24 1257   Resp 10 01/09/24 1249   SpO2 95 % 01/09/24 1257   Vitals shown include unfiled device data.        Post Anesthesia Care and Evaluation    Patient location during evaluation: PHASE II  Patient participation: complete - patient participated  Level of consciousness: awake and alert  Pain management: adequate    Airway patency: patent  Anesthetic complications: No anesthetic complications    Cardiovascular status: acceptable  Respiratory status: acceptable  Hydration status: acceptable    Comments: /69 (BP Location: Right arm, Patient Position: Lying)   Pulse 69   Temp 36.8 °C (98.2 °F) (Oral)   Resp 10   Ht 165.1 cm (65\")   Wt 56.5 kg (124 lb 9 oz)   SpO2 96%   BMI 20.73 kg/m²       "

## 2024-01-10 ENCOUNTER — TELEPHONE (OUTPATIENT)
Dept: GASTROENTEROLOGY | Facility: CLINIC | Age: 64
End: 2024-01-10
Payer: MEDICARE

## 2024-01-10 ENCOUNTER — READMISSION MANAGEMENT (OUTPATIENT)
Dept: CALL CENTER | Facility: HOSPITAL | Age: 64
End: 2024-01-10
Payer: MEDICARE

## 2024-01-10 VITALS
TEMPERATURE: 98.4 F | SYSTOLIC BLOOD PRESSURE: 124 MMHG | RESPIRATION RATE: 16 BRPM | OXYGEN SATURATION: 97 % | DIASTOLIC BLOOD PRESSURE: 74 MMHG | BODY MASS INDEX: 20.75 KG/M2 | HEART RATE: 72 BPM | WEIGHT: 124.56 LBS | HEIGHT: 65 IN

## 2024-01-10 PROBLEM — R10.9 ABDOMINAL PAIN, VOMITING, AND DIARRHEA: Status: RESOLVED | Noted: 2023-02-16 | Resolved: 2024-01-10

## 2024-01-10 PROBLEM — F11.93 OPIOID WITHDRAWAL: Status: RESOLVED | Noted: 2023-02-17 | Resolved: 2024-01-10

## 2024-01-10 PROBLEM — E87.6 HYPOKALEMIA: Status: RESOLVED | Noted: 2023-02-17 | Resolved: 2024-01-10

## 2024-01-10 PROBLEM — R19.7 ABDOMINAL PAIN, VOMITING, AND DIARRHEA: Status: RESOLVED | Noted: 2023-02-16 | Resolved: 2024-01-10

## 2024-01-10 PROBLEM — R11.10 ABDOMINAL PAIN, VOMITING, AND DIARRHEA: Status: RESOLVED | Noted: 2023-02-16 | Resolved: 2024-01-10

## 2024-01-10 LAB
ALBUMIN SERPL-MCNC: 3.4 G/DL (ref 3.5–5.2)
ALBUMIN/GLOB SERPL: 1.8 G/DL
ALP SERPL-CCNC: 30 U/L (ref 39–117)
ALT SERPL W P-5'-P-CCNC: 29 U/L (ref 1–33)
ANION GAP SERPL CALCULATED.3IONS-SCNC: 9.5 MMOL/L (ref 5–15)
AST SERPL-CCNC: 12 U/L (ref 1–32)
BASOPHILS # BLD AUTO: 0 10*3/MM3 (ref 0–0.2)
BASOPHILS NFR BLD AUTO: 0 % (ref 0–1.5)
BILIRUB SERPL-MCNC: <0.2 MG/DL (ref 0–1.2)
BUN SERPL-MCNC: 16 MG/DL (ref 8–23)
BUN/CREAT SERPL: 24.6 (ref 7–25)
CALCIUM SPEC-SCNC: 8.2 MG/DL (ref 8.6–10.5)
CHLORIDE SERPL-SCNC: 105 MMOL/L (ref 98–107)
CO2 SERPL-SCNC: 22.5 MMOL/L (ref 22–29)
CREAT SERPL-MCNC: 0.65 MG/DL (ref 0.57–1)
DEPRECATED RDW RBC AUTO: 47.6 FL (ref 37–54)
EGFRCR SERPLBLD CKD-EPI 2021: 100.3 ML/MIN/1.73
EOSINOPHIL # BLD AUTO: 0 10*3/MM3 (ref 0–0.4)
EOSINOPHIL NFR BLD AUTO: 0 % (ref 0.3–6.2)
ERYTHROCYTE [DISTWIDTH] IN BLOOD BY AUTOMATED COUNT: 13.8 % (ref 12.3–15.4)
GLOBULIN UR ELPH-MCNC: 1.9 GM/DL
GLUCOSE SERPL-MCNC: 127 MG/DL (ref 65–99)
HCT VFR BLD AUTO: 25.8 % (ref 34–46.6)
HGB BLD-MCNC: 8.6 G/DL (ref 12–15.9)
IMM GRANULOCYTES # BLD AUTO: 0.37 10*3/MM3 (ref 0–0.05)
IMM GRANULOCYTES NFR BLD AUTO: 4.9 % (ref 0–0.5)
LAB AP CASE REPORT: NORMAL
LYMPHOCYTES # BLD AUTO: 0.88 10*3/MM3 (ref 0.7–3.1)
LYMPHOCYTES NFR BLD AUTO: 11.6 % (ref 19.6–45.3)
MCH RBC QN AUTO: 32.3 PG (ref 26.6–33)
MCHC RBC AUTO-ENTMCNC: 33.3 G/DL (ref 31.5–35.7)
MCV RBC AUTO: 97 FL (ref 79–97)
MONOCYTES # BLD AUTO: 0.33 10*3/MM3 (ref 0.1–0.9)
MONOCYTES NFR BLD AUTO: 4.3 % (ref 5–12)
NEUTROPHILS NFR BLD AUTO: 6.01 10*3/MM3 (ref 1.7–7)
NEUTROPHILS NFR BLD AUTO: 79.2 % (ref 42.7–76)
NRBC BLD AUTO-RTO: 2 /100 WBC (ref 0–0.2)
PATH REPORT.FINAL DX SPEC: NORMAL
PATH REPORT.GROSS SPEC: NORMAL
PLATELET # BLD AUTO: 472 10*3/MM3 (ref 140–450)
PMV BLD AUTO: 9.5 FL (ref 6–12)
POTASSIUM SERPL-SCNC: 4.1 MMOL/L (ref 3.5–5.2)
PROT SERPL-MCNC: 5.3 G/DL (ref 6–8.5)
RBC # BLD AUTO: 2.66 10*6/MM3 (ref 3.77–5.28)
SODIUM SERPL-SCNC: 137 MMOL/L (ref 136–145)
WBC NRBC COR # BLD AUTO: 7.59 10*3/MM3 (ref 3.4–10.8)

## 2024-01-10 PROCEDURE — 25010000002 METRONIDAZOLE 500 MG/100ML SOLUTION: Performed by: INTERNAL MEDICINE

## 2024-01-10 PROCEDURE — 85025 COMPLETE CBC W/AUTO DIFF WBC: CPT | Performed by: INTERNAL MEDICINE

## 2024-01-10 PROCEDURE — 99232 SBSQ HOSP IP/OBS MODERATE 35: CPT | Performed by: PHYSICIAN ASSISTANT

## 2024-01-10 PROCEDURE — 25010000002 METHYLPREDNISOLONE PER 40 MG: Performed by: INTERNAL MEDICINE

## 2024-01-10 PROCEDURE — 80053 COMPREHEN METABOLIC PANEL: CPT | Performed by: INTERNAL MEDICINE

## 2024-01-10 RX ORDER — PANTOPRAZOLE SODIUM 40 MG/1
40 TABLET, DELAYED RELEASE ORAL
Qty: 60 TABLET | Refills: 0 | Status: SHIPPED | OUTPATIENT
Start: 2024-01-10

## 2024-01-10 RX ORDER — FLUCONAZOLE 100 MG/1
100 TABLET ORAL DAILY
Qty: 14 TABLET | Refills: 0 | Status: SHIPPED | OUTPATIENT
Start: 2024-01-10

## 2024-01-10 RX ADMIN — HYDROCODONE BITARTRATE AND ACETAMINOPHEN 1 TABLET: 10; 325 TABLET ORAL at 06:28

## 2024-01-10 RX ADMIN — PANTOPRAZOLE SODIUM 40 MG: 40 TABLET, DELAYED RELEASE ORAL at 08:02

## 2024-01-10 RX ADMIN — TIZANIDINE 4 MG: 4 TABLET ORAL at 08:02

## 2024-01-10 RX ADMIN — METHYLPREDNISOLONE SODIUM SUCCINATE 40 MG: 40 INJECTION INTRAMUSCULAR; INTRAVENOUS at 03:27

## 2024-01-10 RX ADMIN — METRONIDAZOLE 500 MG: 500 INJECTION, SOLUTION INTRAVENOUS at 03:27

## 2024-01-10 RX ADMIN — ALPRAZOLAM 0.5 MG: 0.5 TABLET ORAL at 08:02

## 2024-01-10 NOTE — PROGRESS NOTES
[facility]  Inpatient Progress Note    Reason for Follow Up:  Colitis    Subjective     Interval History:   Pt is s/p EGD/colonoscopy w/ Dr. Alicea yesterday, see findings below.  She states she feels well. Denies abd pain, diarrhea, nausea/vomiting at this time.   No acute events overnight.       Current Facility-Administered Medications:     ALPRAZolam (XANAX) tablet 0.5 mg, 0.5 mg, Oral, BID, Selvin Cunha MD, 0.5 mg at 01/10/24 0802    Calcium Replacement - Follow Nurse / BPA Driven Protocol, , Does not apply, PRN, Selvin Cunha MD    cefTRIAXone (ROCEPHIN) 2,000 mg in sodium chloride 0.9 % 100 mL IVPB-VTB, 2,000 mg, Intravenous, Q24H, Naty Guido APRN, Last Rate: 200 mL/hr at 01/09/24 1716, 2,000 mg at 01/09/24 1716    [START ON 1/11/2024] fentaNYL (DURAGESIC) 50 MCG/HR patch 1 patch, 1 patch, Transdermal, Q48H **AND** Check Fentanyl Patch Placement, 1 each, Does not apply, Q12H, Naty Guido, APRN    citalopram (CeleXA) tablet 40 mg, 40 mg, Oral, Q PM, Selvin Cunha MD, 40 mg at 01/09/24 2003    HYDROcodone-acetaminophen (NORCO)  MG per tablet 1 tablet, 1 tablet, Oral, Q6H PRN, Selvin Cunha MD, 1 tablet at 01/10/24 0628    lactated ringers infusion, 30 mL/hr, Intravenous, Continuous PRN, Selvin Cunha MD, Last Rate: 125 mL/hr at 01/09/24 1219, Rate Change at 01/09/24 1219    Magnesium Standard Dose Replacement - Follow Nurse / BPA Driven Protocol, , Does not apply, PRN, Selvin Cunha MD    methylPREDNISolone sodium succinate (SOLU-Medrol) injection 40 mg, 40 mg, Intravenous, Q8H, Selvin Cunha MD, 40 mg at 01/10/24 0327    metroNIDAZOLE (FLAGYL) IVPB 500 mg, 500 mg, Intravenous, Q8H, Selvin Cunha MD, Last Rate: 100 mL/hr at 01/10/24 0327, 500 mg at 01/10/24 0327    ondansetron (ZOFRAN) injection 4 mg, 4 mg, Intravenous, Q6H PRN, Selvin Cunha MD, 4 mg at 01/05/24 0900    pantoprazole  (PROTONIX) EC tablet 40 mg, 40 mg, Oral, BID AC, Selvin Cunha MD, 40 mg at 01/10/24 0802    Phosphorus Replacement - Follow Nurse / BPA Driven Protocol, , Does not apply, Alphonse LEWIS Brian David, MD    Potassium Replacement - Follow Nurse / BPA Driven Protocol, , Does not apply, Alphonse LEWIS Brian David, MD    Potassium Replacement - Follow Nurse / BPA Driven Protocol, , Does not apply, Alphonse LEWIS Brian David, MD    sodium chloride 0.9 % flush 10 mL, 10 mL, Intravenous, PRNAlphonse Brian David, MD    tiZANidine (ZANAFLEX) tablet 4 mg, 4 mg, Oral, TID PRN, Selvin Cunha MD, 4 mg at 01/10/24 0802        Objective     Vital Signs  Temp:  [98.2 °F (36.8 °C)-99.1 °F (37.3 °C)] 98.4 °F (36.9 °C)  Heart Rate:  [65-98] 72  Resp:  [10-16] 16  BP: (116-147)/(64-80) 124/74  Body mass index is 20.73 kg/m².    Intake/Output Summary (Last 24 hours) at 1/10/2024 0928  Last data filed at 1/10/2024 0800  Gross per 24 hour   Intake 240 ml   Output --   Net 240 ml     I/O this shift:  In: 240 [P.O.:240]  Out: -      Physical Exam:   General: patient awake, alert and cooperative   Eyes: Normal lids and lashes, no scleral icterus   Abdomen: soft, nontender, nondistended; normal bowel sounds      Results Review:     I reviewed the patient's new clinical results.    Results from last 7 days   Lab Units 01/10/24  0635 01/09/24  0344 01/08/24  1304 01/08/24  0531   WBC 10*3/mm3 7.59 12.16*  --  7.67   HEMOGLOBIN g/dL 8.6* 8.7* 7.2* 7.2*   HEMATOCRIT % 25.8* 26.4* 21.3* 22.8*   PLATELETS 10*3/mm3 472* 468*  --  340     Results from last 7 days   Lab Units 01/10/24  0635 01/09/24  0343 01/08/24  1623 01/08/24  0531   SODIUM mmol/L 137 142  --  137   POTASSIUM mmol/L 4.1 4.5 5.1 3.4*   CHLORIDE mmol/L 105 105  --  102   CO2 mmol/L 22.5 25.9  --  23.2   BUN mg/dL 16 10  --  14   CREATININE mg/dL 0.65 0.59  --  0.63   CALCIUM mg/dL 8.2* 8.9  --  8.1*   BILIRUBIN mg/dL <0.2 <0.2  --  <0.2   ALK PHOS U/L 30* 34*  --   29*   ALT (SGPT) U/L 29 36*  --  33   AST (SGOT) U/L 12 16  --  14   GLUCOSE mg/dL 127* 113*  --  178*     Results from last 7 days   Lab Units 01/04/24  1623   INR  1.15*     Lab Results   Lab Value Date/Time    LIPASE 28 01/04/2024 1535    LIPASE 16 02/16/2023 1440    LIPASE 21 02/18/2022 0748    LIPASE 18 09/11/2021 1235    LIPASE 18 04/25/2021 1641       Radiology:  CT Head Without Contrast   Final Result   There is no evidence of fracture or of intracranial   hemorrhage. Further evaluation could be performed with MRI examination   of the brain as indicated.       CT CERVICAL SPINE WITHOUT CONTRAST: There is moderate loss of disc   height at C5-C6. There is no evidence of prevertebral edema or a   fracture.       C2-C3: A small central disc bulge is present.       C3-C4: A small central disc bulge is present. Mild facet degenerative   disease is present on the right.       C4-C5: A mild central disc bulge is present. Mild-to-moderate facet   degenerative disease is present on the right.       C5-C6: A mild central disc osteophyte complex is present with no   evidence of herniation.       C6-C7: A small central disc bulge is present with no evidence of   herniation.       C7-T1: There is no evidence of disc bulge or herniation.       IMPRESSION: Multilevel degenerative disease involving the cervical spine   is noted as described above. There is no evidence of fracture. See   above.               Radiation dose reduction techniques were utilized, including automated   exposure control and exposure modulation based on body size.           This report was finalized on 1/4/2024 7:41 PM by Dr. Morteza Scruggs M.D   on Workstation: [id number]          CT Cervical Spine Without Contrast   Final Result   There is no evidence of fracture or of intracranial   hemorrhage. Further evaluation could be performed with MRI examination   of the brain as indicated.       CT CERVICAL SPINE WITHOUT CONTRAST: There is moderate loss of  disc   height at C5-C6. There is no evidence of prevertebral edema or a   fracture.       C2-C3: A small central disc bulge is present.       C3-C4: A small central disc bulge is present. Mild facet degenerative   disease is present on the right.       C4-C5: A mild central disc bulge is present. Mild-to-moderate facet   degenerative disease is present on the right.       C5-C6: A mild central disc osteophyte complex is present with no   evidence of herniation.       C6-C7: A small central disc bulge is present with no evidence of   herniation.       C7-T1: There is no evidence of disc bulge or herniation.       IMPRESSION: Multilevel degenerative disease involving the cervical spine   is noted as described above. There is no evidence of fracture. See   above.               Radiation dose reduction techniques were utilized, including automated   exposure control and exposure modulation based on body size.           This report was finalized on 1/4/2024 7:41 PM by Dr. Morteza Scruggs M.D   on Workstation: [id number]          CT Angiogram Abdomen Pelvis   Final Result   1. Diffuse colitis extending from the cecum to the rectum.   2. Atherosclerotic disease. Mild-to-moderate narrowing of the proximal   celiac and superior mesenteric arteries. Multifocal mild iliac stenoses.   No evidence for aneurysm.        Radiation dose reduction techniques were utilized, including automated   exposure control and exposure modulation based on body size.           This report was finalized on 1/4/2024 7:38 PM by Dr. Lionel Rao M.D on Workstation: [id number]          XR Chest 1 View   Final Result   No evidence for active disease in the chest.       This report was finalized on 1/4/2024 5:06 PM by Dr. Lionel Rao M.D on Workstation: [id number]              Assessment & Plan     Active Hospital Problems    Diagnosis     **Acute upper GI bleed     Rheumatoid arthritis     COPD (chronic obstructive pulmonary disease)      Hypokalemia     Opioid withdrawal     Opioid dependence     Anemia     Abdominal pain, vomiting, and diarrhea     Chronic pain     DDD (degenerative disc disease), cervical     DDD (degenerative disc disease), lumbar     Depression     Colitis, collagenous          Assessment:   Diarrhea  Melena  S/p EGD 1/9: White nummular lesions in esophageal mucosa, biopsied. Erosive gastropathy w/ no bleeding, biopsied. Nonbleeding duodnela ulcer w/ no stigmata of bleeding.   Diffuse colitis on CT scan- +FOBT  S/p flex sig 1/9: Entire examined colon appeared normal (colon examined from rectum to L transverse colon). Biopsied for microscopic colitis.   Hx of collagenous colitis on colonoscopy 2 years ago  EYAL- resolved  Normocytic anemia since 02/2023- normal iron studies a year ago. S/p 1 unit of PRBCs upon admission.     Plan:   Continue PPI BID x 2 months.  Diet as tolerated   Can d/c antibiotics seen on colitis seen on imaging    Outpatient f/u in 2-3 weeks to discuss biopsies from the procedures  We will s/o at this time. Please call us with any questions or concerns.     I discussed the patients findings and my recommendations with patient.         James Burris PA-C  [facility]  [address] [place] [address] [address] [place] [address]  Office: [phone]

## 2024-01-10 NOTE — PROGRESS NOTES
Candida esophagitis   -fluconazole 100mg qday x 14 days  -office f/u with Dr HERNANDEZ or GURVINDER in 4-6 weeks

## 2024-01-10 NOTE — TELEPHONE ENCOUNTER
----- Message from Selvin Cunha MD sent at 1/10/2024  1:33 PM EST -----  Candida esophagitis   -fluconazole 100mg qday x 14 days  -office f/u with Dr HERNANDEZ or GURVINDER in 4-6 weeks

## 2024-01-10 NOTE — DISCHARGE SUMMARY
[facility]    Date of Admission: 1/4/2024  Date of Discharge:  1/10/2024    PCP: Radu Rawls MD    Discharge Diagnosis:   Active Hospital Problems    Diagnosis  POA    **Acute upper GI bleed [K92.2]  Yes    Rheumatoid arthritis [M06.9]  Yes    COPD (chronic obstructive pulmonary disease) [J44.9]  Yes    Opioid dependence [F11.20]  Yes    Anemia [D64.9]  Yes    Chronic pain [G89.29]  Yes    DDD (degenerative disc disease), cervical [M50.30]  Yes    DDD (degenerative disc disease), lumbar [M51.36]  Yes    Depression [F32.A]  Yes    Colitis, collagenous [K52.831]  Yes      Resolved Hospital Problems    Diagnosis Date Resolved POA    Hypokalemia [E87.6] 01/10/2024 Yes    Opioid withdrawal [F11.93] 01/10/2024 Yes    Abdominal pain, vomiting, and diarrhea [R10.9, R11.10, R19.7] 01/10/2024 Yes     Procedures Performed  Procedure(s):  COLONOSCOPY to transverse colon withcold biopsies  ESOPHAGOGASTRODUODENOSCOPY with cold biopsies  01/09 1224 FLEXIBLE SIGMOIDOSCOPY  01/09 1135 UPPER GI ENDOSCOPY    Consults       Date and Time Order Name Status Description    1/6/2024 12:27 PM Inpatient Internal Medicine Consult      1/4/2024  7:13 PM Inpatient Gastroenterology Consult Completed     1/4/2024  5:00 PM Pulmonology (on-call MD unless specified)            Hospital Course  Please see history and physical for details. Patient is a 63 y.o. female  that presented to the hospital with nausea, vomiting and diarrhea. She reported some black stools as well and recently ran out of her fentanyl patches secondary to supply issues with her pharmacy.  She has chronic neck and back pain in the setting of rheumatoid arthritis.  He was found to have possible sepsis with EYAL and associated metabolic acidosis. She was admitted to the ICU initially given concerns for GI bleed as well as opiate withdrawal.  She was given fluid boluses and started on empiric antibiotics.  GI was  consulted and she was treated for opiate withdrawal as well as exacerbation of microscopic colitis.  She was moved out of the ICU on 1/6 which [facility] assumed care.    The patient CTA abdomen pelvis on admission revealed diffuse colitis extending from the cecum to the rectum as well as showed mild to moderate narrowing of the proximal celiac and superior mesenteric arteries with multifocal mild iliac stenoses and no evidence for aneurysm.  She did have a positive FOBT and hemoglobin was monitored closely.  She was given empiric IV antibiotics with Rocephin and Flagyl.  GI ultimately took her for EGD and flexible sigmoidoscopy on 1/9 given persistent symptoms.  EGD revealed white nummular lesions in the esophageal mucosa as well as erosive gastropathy with no bleeding as well as nonbleeding duodenal ulcer with no stigmata of bleeding.  Biopsies were obtained and currently pending.  Flexible sigmoidoscopy showed normal-appearing colon biopsies were taken for microscopic colitis.  She was recommended to take Protonix twice daily x 2 months.  There was no need for additional antibiotics per GI recommendations.  She will need to follow-up outpatient in 2 to 3 weeks to discuss biopsies from her procedures.  Otherwise, her nausea, vomiting and diarrhea have all resolved.  She denies any abdominal pain and has not had any further black stools since her scope.   She was noted to have lactic acidosis and EYAL on admission which improved with fluids.  She did have some opiate withdrawal secondary to running out of her fentanyl patches.  This improved and her fentanyl patches were resumed.  She states there was some issues with supply at the pharmacy and she currently has these available at home.  She is followed by pain management in the long run follow-up with them at discharge.  She should avoid NSAIDs given her recent EGD findings.   On the day of discharge, she denies any chest pain, dyspnea, cough, fever or chills.  She denies  any nausea, vomiting or abdominal pain.  Her labs are stable and hemoglobin trending up at 8.6.  She has been cleared for discharge and should follow-up with her PCP in the next 1 to 2 weeks.    I discussed the patient's findings and my recommendations with patient, nursing staff, and Dr. Traylor .    Condition on Discharge: Improved.     Temp:  [98.2 °F (36.8 °C)-99.1 °F (37.3 °C)] 98.4 °F (36.9 °C)  Heart Rate:  [69-98] 72  Resp:  [10-16] 16  BP: (116-142)/(65-80) 124/74  Body mass index is 20.73 kg/m².    Physical Exam  Vitals and nursing note reviewed.   Constitutional:       Appearance: She is not toxic-appearing.   Cardiovascular:      Rate and Rhythm: Normal rate and regular rhythm.      Pulses: Normal pulses.   Pulmonary:      Effort: Pulmonary effort is normal. No respiratory distress.      Breath sounds: Normal breath sounds.   Abdominal:      General: Bowel sounds are normal. There is no distension.      Palpations: Abdomen is soft.      Tenderness: There is no abdominal tenderness.   Musculoskeletal:         General: No swelling. Normal range of motion.   Skin:     General: Skin is warm and dry.      Findings: No bruising.   Neurological:      General: No focal deficit present.      Mental Status: She is alert and oriented to person, place, and time.      Sensory: No sensory deficit.      Coordination: Coordination normal.   Psychiatric:         Mood and Affect: Mood normal.         Behavior: Behavior normal.        Discharge Medications        New Medications        Instructions Start Date   pantoprazole 40 MG EC tablet  Commonly known as: PROTONIX   40 mg, Oral, 2 Times Daily Before Meals             Continue These Medications        Instructions Start Date   ALPRAZolam 0.5 MG tablet  Commonly known as: XANAX   0.5 mg, Oral, 2 Times Daily      citalopram 40 MG tablet  Commonly known as: CeleXA   40 mg, Oral, Every Evening      fentaNYL 50 MCG/HR patch  Commonly known as: DURAGESIC   1 patch, Transdermal,  Every 48 Hours      HYDROcodone-acetaminophen  MG per tablet  Commonly known as: NORCO   1 tablet, Oral, Every 6 Hours PRN      multivitamin tablet tablet   1 tablet, Oral, Daily      tiZANidine 4 MG tablet  Commonly known as: ZANAFLEX   4 mg, Oral, Every 6 Hours PRN             Stop These Medications      diclofenac 50 MG tablet  Commonly known as: CATAFLAM     methylPREDNISolone 4 MG dose pack  Commonly known as: MEDROL             Diet Instructions       Diet: Regular/House Diet, Gastrointestinal Diets; Fiber-Restricted; Regular Texture (IDDSI 7); Thin (IDDSI 0)      Discharge Diet:  Regular/House Diet  Gastrointestinal Diets       Gastrointestinal Diet: Fiber-Restricted    Texture: Regular Texture (IDDSI 7)    Fluid Consistency: Thin (IDDSI 0)           Activity Instructions       Activity as Tolerated             Additional Instructions for the Follow-ups that You Need to Schedule       Discharge Follow-up with PCP   As directed       Currently Documented PCP:    Radu Rawls MD    PCP Phone Number:    [phone]     Follow Up Details: see in 1-2 weeks        Discharge Follow-up with Specified Provider: Dr. Gipson/GI NP; 2 Weeks   As directed      To: Dr. Gipson/GI NP   Follow Up: 2 Weeks               Follow-up Information       Radu Rawls MD .    Specialty: General Practice  Why: see in 1-2 weeks  Contact information:  [address] [facility] [address] [phone]               Selvin Gipson MD. Schedule an appointment as soon as possible for a visit in 2 week(s).    Specialty: Gastroenterology  Contact information:  [facility] [address] [id number] [phone]                           Test Results Pending at Discharge  Pending Labs       Order Current Status    [place] Draw In process    Tissue Pathology Exam In process           BLANE Lopez  01/10/24  12:40 EST    Discharge time spent greater than 30 minutes.

## 2024-01-10 NOTE — PLAN OF CARE
Goal Outcome Evaluation:  Plan of Care Reviewed With: patient        Progress: improving  Outcome Evaluation: Patient is alert and oriented x4, up ad marycarmen in room, on RA, VSS- NSR on telemetry monitor. Patient has discharge order in place and is excited to go home. No complaints of pain thus far- patient has fentanyl patch in place to right buttock. Call light within reach and bed is in lowest position.

## 2024-01-10 NOTE — PLAN OF CARE
Problem: Adult Inpatient Plan of Care  Goal: Plan of Care Review  Flowsheets (Taken 1/9/2024 1944)  Outcome Evaluation: AOX4. Room air. Enema x2 before EGD/colonoscopy. Back on GI diet, tolerating well. Ad marycarmen. Fentanyl patch in place to R buttock. Medicated prn for pain. Call light within reach.   Goal Outcome Evaluation:              Outcome Evaluation: AOX4. Room air. Enema x2 before EGD/colonoscopy. Back on GI diet, tolerating well. Ad marycarmen. Fentanyl patch in place to R buttock. Medicated prn for pain. Call light within reach.

## 2024-01-10 NOTE — TELEPHONE ENCOUNTER
Pt is being d/c today from hospital. She was admitted w/ melena, diarrhea. Can we please call patient to set up a hospital f/u w/ Jackie in 2-3 weeks? Thank you

## 2024-01-11 ENCOUNTER — TELEPHONE (OUTPATIENT)
Dept: GASTROENTEROLOGY | Facility: CLINIC | Age: 64
End: 2024-01-11
Payer: MEDICARE

## 2024-01-11 NOTE — CASE MANAGEMENT/SOCIAL WORK
Case Management Discharge Note      Final Note: home no needs         Selected Continued Care - Discharged on 1/10/2024 Admission date: 1/4/2024 - Discharge disposition: Home or Self Care      Destination    No services have been selected for the patient.                Durable Medical Equipment    No services have been selected for the patient.                Dialysis/Infusion    No services have been selected for the patient.                Home Medical Care    No services have been selected for the patient.                Therapy    No services have been selected for the patient.                Community Resources    No services have been selected for the patient.                Community & DME    No services have been selected for the patient.                    Transportation Services  Private: Car    Final Discharge Disposition Code: 01 - home or self-care

## 2024-01-11 NOTE — OUTREACH NOTE
Prep Survey      Flowsheet Row Responses   [facility] facility patient discharged from? [place]   Is LACE score < 7 ? No   Eligibility Readm Mgmt   Discharge diagnosis Acute upper GI bleed   Does the patient have one of the following disease processes/diagnoses(primary or secondary)? Other   Does the patient have Home health ordered? No   Is there a DME ordered? No   Prep survey completed? Yes            MARIE JONES - Registered Nurse

## 2024-01-13 ENCOUNTER — TELEPHONE (OUTPATIENT)
Dept: GASTROENTEROLOGY | Facility: HOSPITAL | Age: 64
End: 2024-01-13
Payer: MEDICARE

## 2024-01-13 NOTE — TELEPHONE ENCOUNTER
Recent scopes ,no colitis on bx, candida esophagitis.    Having some fecal incontinence not aware,  no tarry, but with voiding has leakage.  No probiotics    Consider   probiotics restart   Fibercon one bid soluable fiber.   Kegel maneuvers    Advised fluconazole and pantoprazole  dont usually cause incontinence  Follow up as scheduled.

## 2024-01-16 ENCOUNTER — READMISSION MANAGEMENT (OUTPATIENT)
Dept: CALL CENTER | Facility: HOSPITAL | Age: 64
End: 2024-01-16
Payer: MEDICARE

## 2024-01-16 NOTE — OUTREACH NOTE
Medical Week 1 Survey      Flowsheet Row Responses   [facility] patient discharged from? [place]   Does the patient have one of the following disease processes/diagnoses(primary or secondary)? Other   Week 1 attempt successful? Yes   Call start time 1320   Call end time 1331   Discharge diagnosis Acute upper GI bleed   Person spoke with today (if not patient) and relationship Patient   Medication alerts for this patient Protonix   Meds reviewed with patient/caregiver? Yes   Is the patient having any side effects they believe may be caused by any medication additions or changes? No   Does the patient have all medications ordered at discharge? Yes   Is the patient taking all medications as directed (includes completed medication regime)? Yes   Comments regarding appointments GI f/u appt on 2/1/24.   Does the patient have a primary care provider?  Yes   Comments regarding PCP PCP hospital f/u on 1/24/24   Has the patient kept scheduled appointments due by today? N/A   Has home health visited the patient within 72 hours of discharge? N/A   Psychosocial issues? No   Comments Pt states she is doing good. No further abdominal issues. Has f/u appt with GI and PCP.   Did the patient receive a copy of their discharge instructions? Yes   Nursing interventions Reviewed instructions with patient   What is the patient's perception of their health status since discharge? Returned to baseline/stable   Is the patient/caregiver able to teach back signs and symptoms related to disease process for when to call PCP? Yes   Is the patient/caregiver able to teach back signs and symptoms related to disease process for when to call 911? Yes   Is the patient/caregiver able to teach back the hierarchy of who to call/visit for symptoms/problems? PCP, Specialist, Home health nurse, Urgent Care, ED, 911 Yes   If the patient is a current smoker, are they able to teach back resources for cessation? Not a smoker   Week 1 call completed? Yes    Graduated Yes   Would this patient benefit from a Referral to [facility] Social Work? No   Is the patient interested in additional calls from an ambulatory ? No   Graduated/Revoked comments Pt denies any concerns or needs.   Call end time [id number]            Maite RAMOS - Registered Nurse

## 2024-01-18 NOTE — PROGRESS NOTES
Enter Query Response Below      Query Response:     Unable to determine         If applicable, please update the problem list.       Patient: Meme Mcginnis        : 1960  Account: [id number]           Admit Date: 2024        How to Respond to this query:       a. Click New Note     b. Answer query within the yellow box.                c. Update the Problem List, if applicable.      If you have any questions about this query contact me at: [email or web address]     :     History and physical states experiencing black watery diarrhea over last 3 days, tested hemoccult positive in emergency room, hgb 10.7, gi bleed, probably upper, maintenance iv fluids, protonix drip, H&H q 6 hours, GI consult, NPO. Gastroenterology consult note states history significant with collagenous colitis.  Hemoglobin 6.5, transfuse 1 u prbc. 1/10 Hemoglobin 8.6.  Underwent EGD with biopsy's, flex sigmoidoscopy with biopsy's. 1/10 Gastroenterology progress note states s/p egd: white nummular lesions in esophageal mucosa, biopsied, erosive gastropathy with no bleeding, biopsied. Non bleeding duodnela ulcer with no stigmata of bleeding. S/P flex sigmoid : colon appeared normal, biopsied for microscopic colitis. 1/10 Gastroenterology progress note states candida esophagitis, fluconazole 100mg qd x 14 days.     Please clarify the etiology of the GI bleed:    GI bleed due to microscopic collagenous colitis  GI bleed of unknown etiology  Other- specify_______________  Unable to determine.    By submitting this query, we are merely seeking further clarification of documentation to accurately reflect all conditions that you are monitoring, evaluating, treating or that extend the hospitalization or utilize additional resources of care. Please utilize your independent clinical judgment when addressing the question(s) above.     This query and your response, once completed, will be entered into the legal medical  record.    Sincerely,  Jose MORTENSEN RN BSN   Clinical Documentation Integrity Program   [email or web address]

## 2025-06-18 ENCOUNTER — APPOINTMENT (OUTPATIENT)
Dept: CT IMAGING | Facility: HOSPITAL | Age: 65
End: 2025-06-18
Payer: MEDICARE

## 2025-06-18 ENCOUNTER — HOSPITAL ENCOUNTER (OUTPATIENT)
Facility: HOSPITAL | Age: 65
Setting detail: OBSERVATION
Discharge: HOME | End: 2025-06-20
Attending: STUDENT IN AN ORGANIZED HEALTH CARE EDUCATION/TRAINING PROGRAM | Admitting: INTERNAL MEDICINE
Payer: MEDICARE

## 2025-06-18 DIAGNOSIS — K26.9 DUODENAL ULCER: Primary | ICD-10-CM

## 2025-06-18 DIAGNOSIS — D50.8 OTHER IRON DEFICIENCY ANEMIA: ICD-10-CM

## 2025-06-18 DIAGNOSIS — K92.1 GASTROINTESTINAL HEMORRHAGE WITH MELENA: ICD-10-CM

## 2025-06-18 PROBLEM — K92.2 GI BLEED: Status: ACTIVE | Noted: 2025-06-18

## 2025-06-18 LAB
ABO GROUP BLD: NORMAL
ALBUMIN SERPL-MCNC: 4.4 G/DL (ref 3.5–5.2)
ALBUMIN/GLOB SERPL: 1.6 G/DL
ALP SERPL-CCNC: 61 U/L (ref 39–117)
ALT SERPL W P-5'-P-CCNC: 10 U/L (ref 1–33)
ANION GAP SERPL CALCULATED.3IONS-SCNC: 15 MMOL/L (ref 5–15)
AST SERPL-CCNC: 13 U/L (ref 1–32)
BACTERIA UR QL AUTO: ABNORMAL /HPF
BASOPHILS # BLD AUTO: 0.02 10*3/MM3 (ref 0–0.2)
BASOPHILS NFR BLD AUTO: 0.3 % (ref 0–1.5)
BILIRUB SERPL-MCNC: 0.2 MG/DL (ref 0–1.2)
BILIRUB UR QL STRIP: NEGATIVE
BLD GP AB SCN SERPL QL: NEGATIVE
BUN SERPL-MCNC: 14 MG/DL (ref 8–23)
BUN/CREAT SERPL: 15.9 (ref 7–25)
CALCIUM SPEC-SCNC: 9.8 MG/DL (ref 8.6–10.5)
CHLORIDE SERPL-SCNC: 98 MMOL/L (ref 98–107)
CLARITY UR: CLEAR
CO2 SERPL-SCNC: 22 MMOL/L (ref 22–29)
COLOR UR: YELLOW
CREAT SERPL-MCNC: 0.88 MG/DL (ref 0.57–1)
DEPRECATED RDW RBC AUTO: 45.2 FL (ref 37–54)
EGFRCR SERPLBLD CKD-EPI 2021: 74.4 ML/MIN/1.73
EOSINOPHIL # BLD AUTO: 0.05 10*3/MM3 (ref 0–0.4)
EOSINOPHIL NFR BLD AUTO: 0.8 % (ref 0.3–6.2)
ERYTHROCYTE [DISTWIDTH] IN BLOOD BY AUTOMATED COUNT: 13.2 % (ref 12.3–15.4)
FERRITIN SERPL-MCNC: 31.7 NG/ML (ref 13–150)
GLOBULIN UR ELPH-MCNC: 2.8 GM/DL
GLUCOSE SERPL-MCNC: 103 MG/DL (ref 65–99)
GLUCOSE UR STRIP-MCNC: NEGATIVE MG/DL
HCT VFR BLD AUTO: 35.2 % (ref 34–46.6)
HGB BLD-MCNC: 11.5 G/DL (ref 12–15.9)
HGB UR QL STRIP.AUTO: ABNORMAL
HOLD SPECIMEN: NORMAL
HYALINE CASTS UR QL AUTO: ABNORMAL /LPF
IMM GRANULOCYTES # BLD AUTO: 0.02 10*3/MM3 (ref 0–0.05)
IMM GRANULOCYTES NFR BLD AUTO: 0.3 % (ref 0–0.5)
IRON 24H UR-MRATE: 36 MCG/DL (ref 37–145)
IRON SATN MFR SERPL: 9 % (ref 20–50)
KETONES UR QL STRIP: ABNORMAL
LEUKOCYTE ESTERASE UR QL STRIP.AUTO: ABNORMAL
LIPASE SERPL-CCNC: 23 U/L (ref 13–60)
LYMPHOCYTES # BLD AUTO: 1.6 10*3/MM3 (ref 0.7–3.1)
LYMPHOCYTES NFR BLD AUTO: 25.8 % (ref 19.6–45.3)
MCH RBC QN AUTO: 30.8 PG (ref 26.6–33)
MCHC RBC AUTO-ENTMCNC: 32.7 G/DL (ref 31.5–35.7)
MCV RBC AUTO: 94.4 FL (ref 79–97)
MONOCYTES # BLD AUTO: 0.33 10*3/MM3 (ref 0.1–0.9)
MONOCYTES NFR BLD AUTO: 5.3 % (ref 5–12)
NEUTROPHILS NFR BLD AUTO: 4.18 10*3/MM3 (ref 1.7–7)
NEUTROPHILS NFR BLD AUTO: 67.5 % (ref 42.7–76)
NITRITE UR QL STRIP: NEGATIVE
NRBC BLD AUTO-RTO: 0 /100 WBC (ref 0–0.2)
PH UR STRIP.AUTO: 5.5 [PH] (ref 5–8)
PLATELET # BLD AUTO: 457 10*3/MM3 (ref 140–450)
PMV BLD AUTO: 9.1 FL (ref 6–12)
POTASSIUM SERPL-SCNC: 3.6 MMOL/L (ref 3.5–5.2)
PROT SERPL-MCNC: 7.2 G/DL (ref 6–8.5)
PROT UR QL STRIP: NEGATIVE
RBC # BLD AUTO: 3.73 10*6/MM3 (ref 3.77–5.28)
RBC # UR STRIP: ABNORMAL /HPF
REF LAB TEST METHOD: ABNORMAL
RH BLD: POSITIVE
SODIUM SERPL-SCNC: 135 MMOL/L (ref 136–145)
SP GR UR STRIP: >1.03 (ref 1–1.03)
SQUAMOUS #/AREA URNS HPF: ABNORMAL /HPF
T&S EXPIRATION DATE: NORMAL
TIBC SERPL-MCNC: 417 MCG/DL (ref 298–536)
TRANSFERRIN SERPL-MCNC: 280 MG/DL (ref 200–360)
UROBILINOGEN UR QL STRIP: ABNORMAL
WBC # UR STRIP: ABNORMAL /HPF
WBC NRBC COR # BLD AUTO: 6.2 10*3/MM3 (ref 3.4–10.8)
WHOLE BLOOD HOLD COAG: NORMAL
WHOLE BLOOD HOLD SPECIMEN: NORMAL

## 2025-06-18 PROCEDURE — 83690 ASSAY OF LIPASE: CPT | Performed by: STUDENT IN AN ORGANIZED HEALTH CARE EDUCATION/TRAINING PROGRAM

## 2025-06-18 PROCEDURE — 85014 HEMATOCRIT: CPT | Performed by: INTERNAL MEDICINE

## 2025-06-18 PROCEDURE — 74177 CT ABD & PELVIS W/CONTRAST: CPT

## 2025-06-18 PROCEDURE — 86850 RBC ANTIBODY SCREEN: CPT | Performed by: INTERNAL MEDICINE

## 2025-06-18 PROCEDURE — 86901 BLOOD TYPING SEROLOGIC RH(D): CPT | Performed by: INTERNAL MEDICINE

## 2025-06-18 PROCEDURE — G0378 HOSPITAL OBSERVATION PER HR: HCPCS

## 2025-06-18 PROCEDURE — 84466 ASSAY OF TRANSFERRIN: CPT | Performed by: INTERNAL MEDICINE

## 2025-06-18 PROCEDURE — 81001 URINALYSIS AUTO W/SCOPE: CPT | Performed by: STUDENT IN AN ORGANIZED HEALTH CARE EDUCATION/TRAINING PROGRAM

## 2025-06-18 PROCEDURE — 85025 COMPLETE CBC W/AUTO DIFF WBC: CPT | Performed by: STUDENT IN AN ORGANIZED HEALTH CARE EDUCATION/TRAINING PROGRAM

## 2025-06-18 PROCEDURE — 36415 COLL VENOUS BLD VENIPUNCTURE: CPT | Performed by: INTERNAL MEDICINE

## 2025-06-18 PROCEDURE — 25810000003 SODIUM CHLORIDE 0.9 % SOLUTION: Performed by: INTERNAL MEDICINE

## 2025-06-18 PROCEDURE — 80053 COMPREHEN METABOLIC PANEL: CPT | Performed by: STUDENT IN AN ORGANIZED HEALTH CARE EDUCATION/TRAINING PROGRAM

## 2025-06-18 PROCEDURE — 25510000001 IOPAMIDOL 61 % SOLUTION: Performed by: STUDENT IN AN ORGANIZED HEALTH CARE EDUCATION/TRAINING PROGRAM

## 2025-06-18 PROCEDURE — 83540 ASSAY OF IRON: CPT | Performed by: INTERNAL MEDICINE

## 2025-06-18 PROCEDURE — 85018 HEMOGLOBIN: CPT | Performed by: INTERNAL MEDICINE

## 2025-06-18 PROCEDURE — 82728 ASSAY OF FERRITIN: CPT | Performed by: INTERNAL MEDICINE

## 2025-06-18 PROCEDURE — 99285 EMERGENCY DEPT VISIT HI MDM: CPT

## 2025-06-18 PROCEDURE — 96361 HYDRATE IV INFUSION ADD-ON: CPT

## 2025-06-18 PROCEDURE — 96374 THER/PROPH/DIAG INJ IV PUSH: CPT

## 2025-06-18 PROCEDURE — 86900 BLOOD TYPING SEROLOGIC ABO: CPT | Performed by: INTERNAL MEDICINE

## 2025-06-18 RX ORDER — CITALOPRAM HYDROBROMIDE 40 MG/1
40 TABLET ORAL EVERY EVENING
Status: DISCONTINUED | OUTPATIENT
Start: 2025-06-18 | End: 2025-06-20 | Stop reason: HOSPADM

## 2025-06-18 RX ORDER — ONDANSETRON 2 MG/ML
4 INJECTION INTRAMUSCULAR; INTRAVENOUS EVERY 6 HOURS PRN
Status: DISCONTINUED | OUTPATIENT
Start: 2025-06-18 | End: 2025-06-20 | Stop reason: HOSPADM

## 2025-06-18 RX ORDER — IOPAMIDOL 612 MG/ML
100 INJECTION, SOLUTION INTRAVASCULAR
Status: COMPLETED | OUTPATIENT
Start: 2025-06-18 | End: 2025-06-18

## 2025-06-18 RX ORDER — FENTANYL 50 UG/1
1 PATCH TRANSDERMAL
Refills: 0 | Status: DISCONTINUED | OUTPATIENT
Start: 2025-06-18 | End: 2025-06-20 | Stop reason: HOSPADM

## 2025-06-18 RX ORDER — DIPHENOXYLATE HYDROCHLORIDE AND ATROPINE SULFATE 2.5; .025 MG/1; MG/1
1 TABLET ORAL DAILY
Status: DISCONTINUED | OUTPATIENT
Start: 2025-06-19 | End: 2025-06-20 | Stop reason: HOSPADM

## 2025-06-18 RX ORDER — HYDROCODONE BITARTRATE AND ACETAMINOPHEN 10; 325 MG/1; MG/1
1 TABLET ORAL EVERY 4 HOURS PRN
Refills: 0 | Status: DISCONTINUED | OUTPATIENT
Start: 2025-06-18 | End: 2025-06-20 | Stop reason: HOSPADM

## 2025-06-18 RX ORDER — PANTOPRAZOLE SODIUM 40 MG/10ML
40 INJECTION, POWDER, LYOPHILIZED, FOR SOLUTION INTRAVENOUS ONCE
Status: COMPLETED | OUTPATIENT
Start: 2025-06-18 | End: 2025-06-18

## 2025-06-18 RX ORDER — TRAZODONE HYDROCHLORIDE 100 MG/1
100 TABLET ORAL NIGHTLY
Status: DISCONTINUED | OUTPATIENT
Start: 2025-06-18 | End: 2025-06-20 | Stop reason: HOSPADM

## 2025-06-18 RX ORDER — ESCITALOPRAM OXALATE 20 MG/1
20 TABLET ORAL DAILY
COMMUNITY
End: 2025-06-20 | Stop reason: HOSPADM

## 2025-06-18 RX ORDER — PANTOPRAZOLE SODIUM 40 MG/10ML
40 INJECTION, POWDER, LYOPHILIZED, FOR SOLUTION INTRAVENOUS
Status: DISCONTINUED | OUTPATIENT
Start: 2025-06-19 | End: 2025-06-20

## 2025-06-18 RX ORDER — TRAZODONE HYDROCHLORIDE 100 MG/1
100 TABLET ORAL NIGHTLY
COMMUNITY

## 2025-06-18 RX ORDER — ALPRAZOLAM 0.5 MG
0.5 TABLET ORAL 2 TIMES DAILY
Status: DISCONTINUED | OUTPATIENT
Start: 2025-06-18 | End: 2025-06-20 | Stop reason: HOSPADM

## 2025-06-18 RX ORDER — SODIUM CHLORIDE 9 MG/ML
100 INJECTION, SOLUTION INTRAVENOUS CONTINUOUS
Status: DISCONTINUED | OUTPATIENT
Start: 2025-06-18 | End: 2025-06-19

## 2025-06-18 RX ORDER — SODIUM CHLORIDE 0.9 % (FLUSH) 0.9 %
10 SYRINGE (ML) INJECTION AS NEEDED
Status: DISCONTINUED | OUTPATIENT
Start: 2025-06-18 | End: 2025-06-20 | Stop reason: HOSPADM

## 2025-06-18 RX ORDER — ONDANSETRON 4 MG/1
4 TABLET, ORALLY DISINTEGRATING ORAL EVERY 6 HOURS PRN
Status: DISCONTINUED | OUTPATIENT
Start: 2025-06-18 | End: 2025-06-20 | Stop reason: HOSPADM

## 2025-06-18 RX ADMIN — HYDROCODONE BITARTRATE AND ACETAMINOPHEN 1 TABLET: 10; 325 TABLET ORAL at 21:21

## 2025-06-18 RX ADMIN — CITALOPRAM 40 MG: 40 TABLET, FILM COATED ORAL at 21:22

## 2025-06-18 RX ADMIN — ALPRAZOLAM 0.5 MG: 0.5 TABLET ORAL at 21:22

## 2025-06-18 RX ADMIN — TRAZODONE HYDROCHLORIDE 100 MG: 100 TABLET ORAL at 21:22

## 2025-06-18 RX ADMIN — IOPAMIDOL 85 ML: 612 INJECTION, SOLUTION INTRAVENOUS at 15:58

## 2025-06-18 RX ADMIN — SODIUM CHLORIDE 100 ML/HR: 9 INJECTION, SOLUTION INTRAVENOUS at 20:34

## 2025-06-18 RX ADMIN — PANTOPRAZOLE SODIUM 40 MG: 40 INJECTION, POWDER, FOR SOLUTION INTRAVENOUS at 18:38

## 2025-06-18 NOTE — ED NOTES
Nursing report ED to floor  Meme Mcginnis  64 y.o.  female    HPI :  HPI  Stated Reason for Visit: abd pain    Chief Complaint  Chief Complaint   Patient presents with    Abdominal Pain       Admitting doctor:   Ayesha Adam MD    Admitting diagnosis:   The encounter diagnosis was Duodenal ulcer.    Code status:   Current Code Status       Date Active Code Status Order ID Comments User Context       6/18/2025 1647 CPR (Attempt to Resuscitate) [id number]  Ayesha Adam MD ED        Question Answer    Code Status (Patient has no pulse and is not breathing) CPR (Attempt to Resuscitate)    Medical Interventions (Patient has pulse or is breathing) Full                    Allergies:   Patient has no known allergies.    Isolation:   No active isolations    Intake and Output  No intake or output data in the 24 hours ending 06/18/25 1804    Weight:   There were no vitals filed for this visit.    Most recent vitals:   Vitals:    06/18/25 1448 06/18/25 1453   BP:  90/65   BP Location:  Right arm   Patient Position:  Sitting   Pulse: (!) 140    Resp: 16    Temp: 98.5 °F (36.9 °C)    TempSrc: Oral    SpO2: 95%        Active LDAs/IV Access:   Lines, Drains & Airways       Active LDAs       Name Placement date Placement time Site Days    Peripheral IV 06/18/25 1510 18 G Right Antecubital 06/18/25  1510  Antecubital  less than 1                    Labs (abnormal labs have a star):   Labs Reviewed   COMPREHENSIVE METABOLIC PANEL - Abnormal; Notable for the following components:       Result Value    Glucose 103 (*)     Sodium 135 (*)     All other components within normal limits    Narrative:     GFR Categories in Chronic Kidney Disease (CKD)              GFR Category          GFR (mL/min/1.73)    Interpretation  G1                    90 or greater        Normal or high (1)  G2                    60-89                Mild decrease (1)  G3a                   45-59                Mild to moderate decrease  G3b                    30-44                Moderate to severe decrease  G4                    15-29                Severe decrease  G5                    14 or less           Kidney failure    (1)In the absence of evidence of kidney disease, neither GFR category G1 or G2 fulfill the criteria for CKD.    eGFR calculation 2021 CKD-EPI creatinine equation, which does not include race as a factor   URINALYSIS W/ MICROSCOPIC IF INDICATED (NO CULTURE) - Abnormal; Notable for the following components:    Specific Gravity, UA >1.030 (*)     Ketones, UA 15 mg/dL (1+) (*)     Blood, UA Small (1+) (*)     Leuk Esterase, UA Moderate (2+) (*)     All other components within normal limits   CBC WITH AUTO DIFFERENTIAL - Abnormal; Notable for the following components:    RBC 3.73 (*)     Hemoglobin 11.5 (*)     Platelets 457 (*)     All other components within normal limits   URINALYSIS, MICROSCOPIC ONLY - Abnormal; Notable for the following components:    RBC, UA 3-5 (*)     WBC, UA 11-20 (*)     Squamous Epithelial Cells, UA 3-6 (*)     All other components within normal limits   IRON PROFILE - Abnormal; Notable for the following components:    Iron 36 (*)     Iron Saturation (TSAT) 9 (*)     All other components within normal limits   LIPASE - Normal   FERRITIN - Normal    Narrative:     Results may be falsely decreased if patient taking Biotin.     RAINBOW DRAW    Narrative:     The following orders were created for panel order [place] Draw.  Procedure                               Abnormality         Status                     ---------                               -----------         ------                     Green Top (Gel)[[id number]]                                  Final result               Lavender Top[[id number]]                                     Final result               Gold Top - SST[[id number]]                                   Final result               [place]roy Top[[id number]]                                         Final  result               Light Blue Top[[id number]]                                   Final result                 Please view results for these tests on the individual orders.   TYPE AND SCREEN   GREEN TOP   LAVENDER TOP   GOLD TOP - SST   GRAY TOP   LIGHT BLUE TOP   CBC AND DIFFERENTIAL    Narrative:     The following orders were created for panel order CBC & Differential.  Procedure                               Abnormality         Status                     ---------                               -----------         ------                     CBC Auto Differential[[id number]]        Abnormal            Final result                 Please view results for these tests on the individual orders.       EKG:   No orders to display       Meds given in ED:   Medications   sodium chloride 0.9 % flush 10 mL (has no administration in time range)   pantoprazole (PROTONIX) injection 40 mg (has no administration in time range)   sodium chloride 0.9 % infusion (has no administration in time range)   ondansetron ODT (ZOFRAN-ODT) disintegrating tablet 4 mg (has no administration in time range)     Or   ondansetron (ZOFRAN) injection 4 mg (has no administration in time range)   melatonin tablet 2.5 mg (has no administration in time range)   iopamidol (ISOVUE-300) 61 % injection 100 mL (85 mL Intravenous Given by Other 6/18/25 2219)       Imaging results:  CT Abdomen Pelvis With Contrast  Result Date: 6/18/2025  There is thickening of the first portion of the duodenum with small outpouching along the posterior wall, appearance raising the concern for a duodenal ulcer. No evidence of extraluminal air or fluid collection or free air. Would suggest GI consultation  Radiation dose reduction techniques were utilized, including automated exposure control and exposure modulation based on body size.   This report was finalized on 6/18/2025 4:10 PM by Dr. Johan Ramirez M.D on Workstation: [id number]        Ambulatory status:   - up with  assist    Social issues:   Social History     Socioeconomic History    Marital status:    Tobacco Use    Smoking status: Former     Current packs/day: 0.00     Average packs/day: 0.3 packs/day for 35.0 years (8.8 ttl pk-yrs)     Types: Cigarettes     Start date: 10/16/1987     Quit date: 10/16/2022     Years since quittin.6    Smokeless tobacco: Never   Vaping Use    Vaping status: Some Days    Substances: Nicotine   Substance and Sexual Activity    Alcohol use: Not Currently    Drug use: Not Currently    Sexual activity: Defer       Peripheral Neurovascular  Peripheral Neurovascular (Adult)  Peripheral Neurovascular WDL: WDL    Neuro Cognitive  Neuro Cognitive (Adult)  Cognitive/Neuro/Behavioral WDL: WDL    Learning  Learning Assessment  Learning Readiness and Ability: no barriers identified  Education Provided  Person Taught: patient    Respiratory  Respiratory WDL  Respiratory WDL: WDL    Abdominal Pain       Pain Assessments  Pain (Adult)  (0-10) Pain Rating: Rest: 7  (0-10) Pain Rating: Activity: 7  Pain Location: abdomen    NIH Stroke Scale       April BOLIVAR Camacho  25 18:04 EDT

## 2025-06-18 NOTE — PROGRESS NOTES
Clinical Pharmacy Services: Medication History    Meme Mcginnis is a 64 y.o. female presenting to [facility] for   Chief Complaint   Patient presents with    Abdominal Pain       She  has a past medical history of Allergic rhinitis, Anxiety, Arthralgia, Chronic neck and back pain, Chronic radicular low back pain, COPD (chronic obstructive pulmonary disease) (01/08/2024), Cough, DDD (degenerative disc disease), cervical, DDD (degenerative disc disease), lumbar, Depression, Fatigue, Hyperlipidemia, Insomnia, Long term use of drug, Neck pain, Palpitations, and Rheumatoid arthritis (01/08/2024).    Allergies as of 06/18/2025    (No Known Allergies)       Medication information was obtained from: Patient   Pharmacy and Phone Number:     Prior to Admission Medications       Prescriptions Last Dose Informant Patient Reported? Taking?    ALPRAZolam (XANAX) 0.5 MG tablet  Self Yes Yes    Take 1 tablet by mouth 2 (Two) Times a Day.    citalopram (CeleXA) 40 MG tablet  Self Yes Yes    Take 1 tablet by mouth Every Evening.    fentaNYL (DURAGESIC) 50 MCG/HR patch 6/18/2025 Self Yes Yes    Place 1 patch on the skin as directed by provider Every Other Day.    HYDROcodone-acetaminophen (NORCO)  MG per tablet  Self Yes Yes    Take 1 tablet by mouth Every 4 (Four) Hours As Needed for Moderate Pain.    multivitamin tablet tablet  Self Yes Yes    Take 1 tablet by mouth Daily.    tiZANidine (ZANAFLEX) 4 MG tablet  Self Yes Yes    Take 1 tablet by mouth Every 6 (Six) Hours As Needed for Muscle Spasms.    traZODone (DESYREL) 100 MG tablet  Self Yes Yes    Take 1 tablet by mouth Every Night.    escitalopram (LEXAPRO) 20 MG tablet  Self Yes No    Take 1 tablet by mouth Daily.    fluconazole (Diflucan) 100 MG tablet   No No    Take 1 tablet by mouth Daily.    pantoprazole (PROTONIX) 40 MG EC tablet   No No    Take 1 tablet by mouth 2 (Two) Times a Day Before Meals.              Medication notes: Patient stated she has  not started lexapro yet.     This medication list is complete to the best of my knowledge as of 6/18/2025    Please call if questions.    Toño Lawson  Medication History Technician   [phone]    6/18/2025 18:03 EDT

## 2025-06-18 NOTE — Clinical Note
Level of Care: Med/Surg [1]   Diagnosis: Duodenal ulcer [149925]   Admitting Physician: JENIFFER TEIXEIRA [[id number]]   Attending Physician: JENIFFER TEIXEIRA [[id number]]   Is patient appropriate for Inpatient Observation Unit?: Yes [1]

## 2025-06-18 NOTE — ED PROVIDER NOTES
EMERGENCY DEPARTMENT ENCOUNTER  Room Number:  10/10  PCP: Radu Rawls MD  Independent Historians: Patient      HPI:  Chief Complaint: had concerns including Abdominal Pain.     Context: Meme Mcginnis is a 64 y.o. female with a medical history of constipation, RA, COPD who presents to the ED c/o acute abdominal pain.  Pain is located in the epigastrium.  Symptoms been occurring for approximately 2 weeks but worsened over the last 24 to 48 hours.  Patient states about 2 weeks ago she had some colitis and had a little bit of blood in her stool.  Patient has not noticed any blood in her stool since that time.  Patient states initially those symptoms are improved but significantly worsened over the last few days.  Patient states she has had a hysterectomy but denies other abdominal surgeries.      Review of prior external notes (non-ED) -and- Review of prior external test results outside of this encounter: Office visit with family medicine from 5/20/2025 reviewed and notable for presentation secondary to back pain.  Symptoms were chronic in nature with acute worsening.  Patient was started on a fentanyl patch and prescribed Norco.  Plan to refer to dermatology for skin issue.    Prescription drug monitoring program review:         PAST MEDICAL HISTORY  Active Ambulatory Problems     Diagnosis Date Noted    Constipation 01/31/2022    Abnormal CT scan, colon 01/31/2022    Colitis, collagenous 01/31/2022    Pain of upper abdomen 01/31/2022    Non-intractable vomiting 01/31/2022    Pleuritic chest pain 09/30/2022    DDD (degenerative disc disease), cervical     DDD (degenerative disc disease), lumbar     Depression     Pleurisy     Chronic pain 10/03/2022    Opioid dependence 02/17/2023    Anemia 02/17/2023    Acute upper GI bleed 01/04/2024    Rheumatoid arthritis 01/08/2024    COPD (chronic obstructive pulmonary disease) 01/08/2024     Resolved Ambulatory Problems     Diagnosis Date Noted    Abdominal pain,  vomiting, and diarrhea 2023    Opioid withdrawal 2023    Hypokalemia 2023     Past Medical History:   Diagnosis Date    Allergic rhinitis     Anxiety     Arthralgia     Chronic neck and back pain     Chronic radicular low back pain     Cough     Fatigue     Hyperlipidemia     Insomnia     Long term use of drug     Neck pain     Palpitations          PAST SURGICAL HISTORY  Past Surgical History:   Procedure Laterality Date    COLONOSCOPY      COLONOSCOPY N/A 2024    Procedure: COLONOSCOPY to transverse colon withcold biopsies;  Surgeon: Selvin Cunha MD;  Location: [facility] ENDOSCOPY;  Service: Gastroenterology;  Laterality: N/A;  Pre: Gi bleed  Post: normal to transverse    ENDOSCOPY N/A 2024    Procedure: ESOPHAGOGASTRODUODENOSCOPY with cold biopsies;  Surgeon: Selvin Cunha MD;  Location: [facility] ENDOSCOPY;  Service: Gastroenterology;  Laterality: N/A;  Pre: GI bleed  Post: canida, duodenal ulcer, gastritis    HYSTERECTOMY      NECK SURGERY           FAMILY HISTORY  Family History   Problem Relation Age of Onset    Atrial fibrillation Mother          SOCIAL HISTORY  Social History     Socioeconomic History    Marital status:    Tobacco Use    Smoking status: Former     Current packs/day: 0.00     Average packs/day: 0.3 packs/day for 35.0 years (8.8 ttl pk-yrs)     Types: Cigarettes     Start date: 10/16/1987     Quit date: 10/16/2022     Years since quittin.6    Smokeless tobacco: Never   Vaping Use    Vaping status: Some Days    Substances: Nicotine   Substance and Sexual Activity    Alcohol use: Not Currently    Drug use: Not Currently    Sexual activity: Defer         ALLERGIES  Patient has no known allergies.      REVIEW OF SYSTEMS  Review of Systems  Included in HPI  All systems reviewed and negative except for those discussed in HPI.      PHYSICAL EXAM    I have reviewed the triage vital signs and nursing notes.    ED Triage Vitals   Temp Heart Rate  Resp BP SpO2   06/18/25 1448 06/18/25 1448 06/18/25 1448 06/18/25 1453 06/18/25 1448   98.5 °F (36.9 °C) (!) 140 16 90/65 95 %      Temp src Heart Rate Source Patient Position BP Location FiO2 (%)   06/18/25 1448 06/18/25 1448 06/18/25 1453 06/18/25 1453 --   Oral Monitor Sitting Right arm        Physical Exam  GENERAL: alert, no acute distress  SKIN: Warm, dry  HENT: Normocephalic, atraumatic  EYES: no scleral icterus  CV: regular rhythm, regular rate  RESPIRATORY: normal effort, lungs clear  ABDOMEN: soft, mild tenderness in the right upper quadrant and epigastrium  MUSCULOSKELETAL: no deformity  NEURO: alert, moves all extremities, follows commands            LAB RESULTS  Recent Results (from the past 24 hours)   Green Top (Gel)    Collection Time: 06/18/25  3:11 PM   Result Value Ref Range    Extra Tube Hold for add-ons.    Lavender Top    Collection Time: 06/18/25  3:11 PM   Result Value Ref Range    Extra Tube hold for add-on    Gold Top - SST    Collection Time: 06/18/25  3:11 PM   Result Value Ref Range    Extra Tube Hold for add-ons.    Gray Top    Collection Time: 06/18/25  3:11 PM   Result Value Ref Range    Extra Tube Hold for add-ons.    Light Blue Top    Collection Time: 06/18/25  3:11 PM   Result Value Ref Range    Extra Tube Hold for add-ons.    Comprehensive Metabolic Panel    Collection Time: 06/18/25  3:11 PM    Specimen: Blood   Result Value Ref Range    Glucose 103 (H) 65 - 99 mg/dL    BUN 14.0 8.0 - 23.0 mg/dL    Creatinine 0.88 0.57 - 1.00 mg/dL    Sodium 135 (L) 136 - 145 mmol/L    Potassium 3.6 3.5 - 5.2 mmol/L    Chloride 98 98 - 107 mmol/L    CO2 22.0 22.0 - 29.0 mmol/L    Calcium 9.8 8.6 - 10.5 mg/dL    Total Protein 7.2 6.0 - 8.5 g/dL    Albumin 4.4 3.5 - 5.2 g/dL    ALT (SGPT) 10 1 - 33 U/L    AST (SGOT) 13 1 - 32 U/L    Alkaline Phosphatase 61 39 - 117 U/L    Total Bilirubin 0.2 0.0 - 1.2 mg/dL    Globulin 2.8 gm/dL    A/G Ratio 1.6 g/dL    BUN/Creatinine Ratio 15.9 7.0 - 25.0    Anion  Gap 15.0 5.0 - 15.0 mmol/L    eGFR 74.4 >60.0 mL/min/1.73   Lipase    Collection Time: 06/18/25  3:11 PM    Specimen: Blood   Result Value Ref Range    Lipase 23 13 - 60 U/L   CBC Auto Differential    Collection Time: 06/18/25  3:11 PM    Specimen: Blood   Result Value Ref Range    WBC 6.20 3.40 - 10.80 10*3/mm3    RBC 3.73 (L) 3.77 - 5.28 10*6/mm3    Hemoglobin 11.5 (L) 12.0 - 15.9 g/dL    Hematocrit 35.2 34.0 - 46.6 %    MCV 94.4 79.0 - 97.0 fL    MCH 30.8 26.6 - 33.0 pg    MCHC 32.7 31.5 - 35.7 g/dL    RDW 13.2 12.3 - 15.4 %    RDW-SD 45.2 37.0 - 54.0 fl    MPV 9.1 6.0 - 12.0 fL    Platelets 457 (H) 140 - 450 10*3/mm3    Neutrophil % 67.5 42.7 - 76.0 %    Lymphocyte % 25.8 19.6 - 45.3 %    Monocyte % 5.3 5.0 - 12.0 %    Eosinophil % 0.8 0.3 - 6.2 %    Basophil % 0.3 0.0 - 1.5 %    Immature Grans % 0.3 0.0 - 0.5 %    Neutrophils, Absolute 4.18 1.70 - 7.00 10*3/mm3    Lymphocytes, Absolute 1.60 0.70 - 3.10 10*3/mm3    Monocytes, Absolute 0.33 0.10 - 0.90 10*3/mm3    Eosinophils, Absolute 0.05 0.00 - 0.40 10*3/mm3    Basophils, Absolute 0.02 0.00 - 0.20 10*3/mm3    Immature Grans, Absolute 0.02 0.00 - 0.05 10*3/mm3    nRBC 0.0 0.0 - 0.2 /100 WBC   Urinalysis With Microscopic If Indicated (No Culture) - Urine, Clean Catch    Collection Time: 06/18/25  4:18 PM    Specimen: Urine, Clean Catch   Result Value Ref Range    Color, UA Yellow Yellow, Straw    Appearance, UA Clear Clear    pH, UA 5.5 5.0 - 8.0    Specific Gravity, UA >1.030 (H) 1.005 - 1.030    Glucose, UA Negative Negative    Ketones, UA 15 mg/dL (1+) (A) Negative    Bilirubin, UA Negative Negative    Blood, UA Small (1+) (A) Negative    Protein, UA Negative Negative    Leuk Esterase, UA Moderate (2+) (A) Negative    Nitrite, UA Negative Negative    Urobilinogen, UA 1.0 E.U./dL 0.2 - 1.0 E.U./dL   Urinalysis, Microscopic Only - Urine, Clean Catch    Collection Time: 06/18/25  4:18 PM    Specimen: Urine, Clean Catch   Result Value Ref Range    RBC, UA 3-5 (A)  None Seen, 0-2 /HPF    WBC, UA 11-20 (A) None Seen, 0-2 /HPF    Bacteria, UA None Seen None Seen /HPF    Squamous Epithelial Cells, UA 3-6 (A) None Seen, 0-2 /HPF    Hyaline Casts, UA None Seen None Seen /LPF    Methodology Automated Microscopy          RADIOLOGY  CT Abdomen Pelvis With Contrast  Result Date: 6/18/2025  CT ABDOMEN AND PELVIS WITH IV CONTRAST  HISTORY: Abdominal pain  TECHNIQUE: Radiation dose reduction techniques were utilized, including automated exposure control and exposure modulation based on body size. Axial images were obtained through the abdomen and pelvis after the administration of IV contrast. Coronal and sagittal reformatted images obtained.  COMPARISON: 1/4/2024  FINDINGS:  ABDOMEN: The lung bases are clear. The liver and the gallbladder are unremarkable. The spleen is unremarkable. The kidneys are unremarkable. The adrenal glands are unremarkable. The pancreas is unremarkable. There is thickening of the first portion of the duodenum with a small outpouching along the posterior wall of the first portion duodenum. The appearance raises the concern for a duodenal ulcer. There is no evidence of extraluminal air or fluid collection. There is no free air.  PELVIS: The bladder is unremarkable. Hysterectomy. The colon is unremarkable. Normal appendix. No acute bony abnormality      There is thickening of the first portion of the duodenum with small outpouching along the posterior wall, appearance raising the concern for a duodenal ulcer. No evidence of extraluminal air or fluid collection or free air. Would suggest GI consultation  Radiation dose reduction techniques were utilized, including automated exposure control and exposure modulation based on body size.   This report was finalized on 6/18/2025 4:10 PM by Dr. Johan Ramirez M.D on Workstation: [id number]          MEDICATIONS GIVEN IN ER  Medications   sodium chloride 0.9 % flush 10 mL (has no administration in time range)    pantoprazole (PROTONIX) injection 40 mg (has no administration in time range)   iopamidol (ISOVUE-300) 61 % injection 100 mL (85 mL Intravenous Given by Other 6/18/25 1079)         ORDERS PLACED DURING THIS VISIT:  Orders Placed This Encounter   Procedures    CT Abdomen Pelvis With Contrast    [facility] Draw    Comprehensive Metabolic Panel    Lipase    Urinalysis With Microscopic If Indicated (No Culture) - Urine, Clean Catch    CBC Auto Differential    Urinalysis, Microscopic Only - Urine, Clean Catch    [facility] (on-call MD unless specified) Details    Gastroenterology (on-call MD unless specified)    Insert peripheral IV    Initiate Observation Status    Green Top (Gel)    Lavender Top    Gold Top - SST    Gray Top    Light Blue Top    CBC & Differential         OUTPATIENT MEDICATION MANAGEMENT:  Current Facility-Administered Medications Ordered in Epic   Medication Dose Route Frequency Provider Last Rate Last Admin    pantoprazole (PROTONIX) injection 40 mg  40 mg Intravenous Once Kyler Lewis MD        sodium chloride 0.9 % flush 10 mL  10 mL Intravenous PRN Kyler Lewis MD         Current Outpatient Medications Ordered in Epic   Medication Sig Dispense Refill    ALPRAZolam (XANAX) 0.5 MG tablet Take 1 tablet by mouth 2 (Two) Times a Day.      citalopram (CeleXA) 40 MG tablet Take 1 tablet by mouth Every Evening.      fentaNYL (DURAGESIC) 50 MCG/HR patch Place 1 patch on the skin as directed by provider Every Other Day.      fluconazole (Diflucan) 100 MG tablet Take 1 tablet by mouth Daily. 14 tablet 0    HYDROcodone-acetaminophen (NORCO)  MG per tablet Take 1 tablet by mouth Every 6 (Six) Hours As Needed for Moderate Pain.      multivitamin tablet tablet Take 1 tablet by mouth Daily.      pantoprazole (PROTONIX) 40 MG EC tablet Take 1 tablet by mouth 2 (Two) Times a Day Before Meals. 60 tablet 0    tiZANidine (ZANAFLEX) 4 MG tablet Take 1 tablet by mouth Every 6 (Six) Hours As Needed for Muscle  Spasms.           PROCEDURES  Procedures            PROGRESS, DATA ANALYSIS, CONSULTS, AND MEDICAL DECISION MAKING  All labs have been independently interpreted by me.  All radiology studies have been reviewed by me. All EKG's have been independently viewed and interpreted by me.  Discussion below represents my analysis of pertinent findings related to patient's condition, differential diagnosis, treatment plan and final disposition.    Differential diagnosis includes but is not limited to gastritis, PUD, cholecystitis, choledocholithiasis, cholelithiasis, colitis, volvulus, GERD, pancreatitis, aortic pathology, ACS.    Clinical Scores:                                       ED Course as of 06/18/25 1647   Wed Jun 18, 2025   1540 Hemoglobin(!): 11.5 [MW]   1604 CT abdomen and pelvis interpreted by me and demonstrates no evidence of free air [MW]   1636 Discussed with Dr. Son of GI who recommends pantoprazole 40 mg twice daily, admission as patient will likely require scope. [MW]   1638 Workup in the ER is notable for mild anemia.  Radiological evaluation concerning for duodenal ulcer.  Discussed with GI recommend PPI twice daily.  Will initiate pantoprazole and admit.  GI planning potential EGD.  Patient has not had any hematemesis, melena [MW]   1646 Discussed with Dr. Adam who agrees to admit [MW]      ED Course User Index  [MW] Kyler Lewis MD             AS OF 16:47 EDT VITALS:    BP - 90/65  HR - (!) 140  TEMP - 98.5 °F (36.9 °C) (Oral)  O2 SATS - 95%    COMPLEXITY OF CARE  The patient requires admission.      DIAGNOSIS  Final diagnoses:   Duodenal ulcer         DISPOSITION  ED Disposition       ED Disposition   Decision to Admit    Condition   --    Comment   Level of Care: Med/Surg [1]   Diagnosis: Duodenal ulcer [[id number]]   Admitting Physician: JENIFFER ADAM [[id number]]   Attending Physician: JENIFFER ADAM [[id number]]   Is patient appropriate for Inpatient Observation Unit?: Yes [1]                   Please note that portions of this document were completed with a voice recognition program.    Note Disclaimer: At [facility], we believe that sharing information builds trust and better relationships. You are receiving this note because you recently visited [facility]. It is possible you will see health information before a provider has talked with you about it. This kind of information can be easy to misunderstand. To help you fully understand what it means for your health, we urge you to discuss this note with your provider.         Kyler Lewis MD  06/18/25 0083

## 2025-06-18 NOTE — ED NOTES
Pt complains of abd pain that started a couple of weeks ago. Reports that it got worse this past week. Complains of nausea and diarrhea.

## 2025-06-19 ENCOUNTER — ANESTHESIA (OUTPATIENT)
Dept: GASTROENTEROLOGY | Facility: HOSPITAL | Age: 65
End: 2025-06-19
Payer: MEDICARE

## 2025-06-19 ENCOUNTER — ANESTHESIA EVENT (OUTPATIENT)
Dept: GASTROENTEROLOGY | Facility: HOSPITAL | Age: 65
End: 2025-06-19
Payer: MEDICARE

## 2025-06-19 PROBLEM — D50.9 IRON DEFICIENCY ANEMIA: Status: ACTIVE | Noted: 2025-06-19

## 2025-06-19 LAB
ANION GAP SERPL CALCULATED.3IONS-SCNC: 7.8 MMOL/L (ref 5–15)
BUN SERPL-MCNC: 10 MG/DL (ref 8–23)
BUN/CREAT SERPL: 13.7 (ref 7–25)
CALCIUM SPEC-SCNC: 8.2 MG/DL (ref 8.6–10.5)
CHLORIDE SERPL-SCNC: 109 MMOL/L (ref 98–107)
CO2 SERPL-SCNC: 21.2 MMOL/L (ref 22–29)
CREAT SERPL-MCNC: 0.73 MG/DL (ref 0.57–1)
D-LACTATE SERPL-SCNC: 1.3 MMOL/L (ref 0.5–2)
DEPRECATED RDW RBC AUTO: 45 FL (ref 37–54)
DEPRECATED RDW RBC AUTO: 53.3 FL (ref 37–54)
EGFRCR SERPLBLD CKD-EPI 2021: 93.1 ML/MIN/1.73
ERYTHROCYTE [DISTWIDTH] IN BLOOD BY AUTOMATED COUNT: 13.2 % (ref 12.3–15.4)
ERYTHROCYTE [DISTWIDTH] IN BLOOD BY AUTOMATED COUNT: 14.4 % (ref 12.3–15.4)
GLUCOSE BLDC GLUCOMTR-MCNC: 141 MG/DL (ref 70–130)
GLUCOSE SERPL-MCNC: 96 MG/DL (ref 65–99)
HCT VFR BLD AUTO: 29.6 % (ref 34–46.6)
HCT VFR BLD AUTO: 30.6 % (ref 34–46.6)
HCT VFR BLD AUTO: 30.7 % (ref 34–46.6)
HCT VFR BLD AUTO: 32.3 % (ref 34–46.6)
HCT VFR BLD AUTO: 33.1 % (ref 34–46.6)
HGB BLD-MCNC: 10 G/DL (ref 12–15.9)
HGB BLD-MCNC: 10.8 G/DL (ref 12–15.9)
HGB BLD-MCNC: 9.5 G/DL (ref 12–15.9)
HGB BLD-MCNC: 9.7 G/DL (ref 12–15.9)
HGB BLD-MCNC: 9.8 G/DL (ref 12–15.9)
MAGNESIUM SERPL-MCNC: 2 MG/DL (ref 1.6–2.4)
MCH RBC QN AUTO: 31.1 PG (ref 26.6–33)
MCH RBC QN AUTO: 31.3 PG (ref 26.6–33)
MCHC RBC AUTO-ENTMCNC: 30 G/DL (ref 31.5–35.7)
MCHC RBC AUTO-ENTMCNC: 33.1 G/DL (ref 31.5–35.7)
MCV RBC AUTO: 103.5 FL (ref 79–97)
MCV RBC AUTO: 94.6 FL (ref 79–97)
PHOSPHATE SERPL-MCNC: 3.1 MG/DL (ref 2.5–4.5)
PLATELET # BLD AUTO: 308 10*3/MM3 (ref 140–450)
PLATELET # BLD AUTO: 347 10*3/MM3 (ref 140–450)
PMV BLD AUTO: 9.3 FL (ref 6–12)
PMV BLD AUTO: 9.6 FL (ref 6–12)
POTASSIUM SERPL-SCNC: 4.2 MMOL/L (ref 3.5–5.2)
RBC # BLD AUTO: 3.12 10*6/MM3 (ref 3.77–5.28)
RBC # BLD AUTO: 3.13 10*6/MM3 (ref 3.77–5.28)
SODIUM SERPL-SCNC: 138 MMOL/L (ref 136–145)
WBC NRBC COR # BLD AUTO: 4.07 10*3/MM3 (ref 3.4–10.8)
WBC NRBC COR # BLD AUTO: 4.42 10*3/MM3 (ref 3.4–10.8)

## 2025-06-19 PROCEDURE — G0378 HOSPITAL OBSERVATION PER HR: HCPCS

## 2025-06-19 PROCEDURE — 25810000003 SODIUM CHLORIDE 0.9 % SOLUTION: Performed by: NURSE PRACTITIONER

## 2025-06-19 PROCEDURE — 25010000002 NA FERRIC GLUC CPLX PER 12.5 MG: Performed by: STUDENT IN AN ORGANIZED HEALTH CARE EDUCATION/TRAINING PROGRAM

## 2025-06-19 PROCEDURE — 96376 TX/PRO/DX INJ SAME DRUG ADON: CPT

## 2025-06-19 PROCEDURE — 43239 EGD BIOPSY SINGLE/MULTIPLE: CPT | Performed by: INTERNAL MEDICINE

## 2025-06-19 PROCEDURE — 88305 TISSUE EXAM BY PATHOLOGIST: CPT | Performed by: INTERNAL MEDICINE

## 2025-06-19 PROCEDURE — 25010000002 LIDOCAINE 2% SOLUTION: Performed by: NURSE ANESTHETIST, CERTIFIED REGISTERED

## 2025-06-19 PROCEDURE — 85027 COMPLETE CBC AUTOMATED: CPT | Performed by: INTERNAL MEDICINE

## 2025-06-19 PROCEDURE — 99214 OFFICE O/P EST MOD 30 MIN: CPT | Performed by: INTERNAL MEDICINE

## 2025-06-19 PROCEDURE — 85027 COMPLETE CBC AUTOMATED: CPT | Performed by: NURSE PRACTITIONER

## 2025-06-19 PROCEDURE — 25810000003 SODIUM CHLORIDE 0.9 % SOLUTION: Performed by: INTERNAL MEDICINE

## 2025-06-19 PROCEDURE — 84100 ASSAY OF PHOSPHORUS: CPT | Performed by: STUDENT IN AN ORGANIZED HEALTH CARE EDUCATION/TRAINING PROGRAM

## 2025-06-19 PROCEDURE — 25010000002 ONDANSETRON PER 1 MG

## 2025-06-19 PROCEDURE — 80048 BASIC METABOLIC PNL TOTAL CA: CPT | Performed by: INTERNAL MEDICINE

## 2025-06-19 PROCEDURE — 82948 REAGENT STRIP/BLOOD GLUCOSE: CPT

## 2025-06-19 PROCEDURE — 83605 ASSAY OF LACTIC ACID: CPT | Performed by: STUDENT IN AN ORGANIZED HEALTH CARE EDUCATION/TRAINING PROGRAM

## 2025-06-19 PROCEDURE — 85018 HEMOGLOBIN: CPT | Performed by: INTERNAL MEDICINE

## 2025-06-19 PROCEDURE — 94799 UNLISTED PULMONARY SVC/PX: CPT

## 2025-06-19 PROCEDURE — 83735 ASSAY OF MAGNESIUM: CPT | Performed by: STUDENT IN AN ORGANIZED HEALTH CARE EDUCATION/TRAINING PROGRAM

## 2025-06-19 PROCEDURE — 25010000002 PROPOFOL 10 MG/ML EMULSION: Performed by: NURSE ANESTHETIST, CERTIFIED REGISTERED

## 2025-06-19 PROCEDURE — 85014 HEMATOCRIT: CPT | Performed by: INTERNAL MEDICINE

## 2025-06-19 PROCEDURE — 85014 HEMATOCRIT: CPT | Performed by: STUDENT IN AN ORGANIZED HEALTH CARE EDUCATION/TRAINING PROGRAM

## 2025-06-19 PROCEDURE — 96361 HYDRATE IV INFUSION ADD-ON: CPT

## 2025-06-19 PROCEDURE — 85018 HEMOGLOBIN: CPT | Performed by: STUDENT IN AN ORGANIZED HEALTH CARE EDUCATION/TRAINING PROGRAM

## 2025-06-19 PROCEDURE — 88342 IMHCHEM/IMCYTCHM 1ST ANTB: CPT | Performed by: INTERNAL MEDICINE

## 2025-06-19 RX ORDER — ONDANSETRON 2 MG/ML
INJECTION INTRAMUSCULAR; INTRAVENOUS
Status: COMPLETED
Start: 2025-06-19 | End: 2025-06-19

## 2025-06-19 RX ORDER — ACETAMINOPHEN 325 MG/1
650 TABLET ORAL ONCE
Status: COMPLETED | OUTPATIENT
Start: 2025-06-19 | End: 2025-06-19

## 2025-06-19 RX ORDER — SUCRALFATE 1 G/1
1 TABLET ORAL
Status: DISCONTINUED | OUTPATIENT
Start: 2025-06-19 | End: 2025-06-20 | Stop reason: HOSPADM

## 2025-06-19 RX ORDER — LIDOCAINE HYDROCHLORIDE 20 MG/ML
INJECTION, SOLUTION INFILTRATION; PERINEURAL AS NEEDED
Status: DISCONTINUED | OUTPATIENT
Start: 2025-06-19 | End: 2025-06-19 | Stop reason: SURG

## 2025-06-19 RX ORDER — CETIRIZINE HYDROCHLORIDE 10 MG/1
10 TABLET ORAL ONCE
Status: COMPLETED | OUTPATIENT
Start: 2025-06-19 | End: 2025-06-19

## 2025-06-19 RX ORDER — PROPOFOL 10 MG/ML
VIAL (ML) INTRAVENOUS AS NEEDED
Status: DISCONTINUED | OUTPATIENT
Start: 2025-06-19 | End: 2025-06-19 | Stop reason: SURG

## 2025-06-19 RX ORDER — SODIUM CHLORIDE 9 MG/ML
1000 INJECTION, SOLUTION INTRAVENOUS CONTINUOUS
Status: ACTIVE | OUTPATIENT
Start: 2025-06-19 | End: 2025-06-19

## 2025-06-19 RX ADMIN — ONDANSETRON 4 MG: 2 INJECTION INTRAMUSCULAR; INTRAVENOUS at 12:39

## 2025-06-19 RX ADMIN — HYDROCODONE BITARTRATE AND ACETAMINOPHEN 1 TABLET: 10; 325 TABLET ORAL at 04:38

## 2025-06-19 RX ADMIN — PROPOFOL 160 MCG/KG/MIN: 10 INJECTION, EMULSION INTRAVENOUS at 12:21

## 2025-06-19 RX ADMIN — ACETAMINOPHEN 650 MG: 325 TABLET ORAL at 16:55

## 2025-06-19 RX ADMIN — SUCRALFATE 1 G: 1 TABLET ORAL at 16:55

## 2025-06-19 RX ADMIN — SODIUM CHLORIDE 1000 ML: 9 INJECTION, SOLUTION INTRAVENOUS at 11:47

## 2025-06-19 RX ADMIN — CITALOPRAM 40 MG: 40 TABLET, FILM COATED ORAL at 16:55

## 2025-06-19 RX ADMIN — HYDROCODONE BITARTRATE AND ACETAMINOPHEN 1 TABLET: 10; 325 TABLET ORAL at 17:53

## 2025-06-19 RX ADMIN — PANTOPRAZOLE SODIUM 40 MG: 40 INJECTION, POWDER, FOR SOLUTION INTRAVENOUS at 16:55

## 2025-06-19 RX ADMIN — PANTOPRAZOLE SODIUM 40 MG: 40 INJECTION, POWDER, FOR SOLUTION INTRAVENOUS at 08:14

## 2025-06-19 RX ADMIN — CETIRIZINE HYDROCHLORIDE 10 MG: 10 TABLET, FILM COATED ORAL at 16:55

## 2025-06-19 RX ADMIN — LIDOCAINE HYDROCHLORIDE 80 MG: 20 INJECTION, SOLUTION INFILTRATION; PERINEURAL at 12:21

## 2025-06-19 RX ADMIN — Medication 2.5 MG: at 21:03

## 2025-06-19 RX ADMIN — ONDANSETRON 4 MG: 2 INJECTION, SOLUTION INTRAMUSCULAR; INTRAVENOUS at 12:39

## 2025-06-19 RX ADMIN — THERA TABS 1 TABLET: TAB at 08:14

## 2025-06-19 RX ADMIN — HYDROCODONE BITARTRATE AND ACETAMINOPHEN 1 TABLET: 10; 325 TABLET ORAL at 23:33

## 2025-06-19 RX ADMIN — SODIUM CHLORIDE 500 ML: 9 INJECTION, SOLUTION INTRAVENOUS at 00:25

## 2025-06-19 RX ADMIN — PROPOFOL 80 MG: 10 INJECTION, EMULSION INTRAVENOUS at 12:21

## 2025-06-19 RX ADMIN — TRAZODONE HYDROCHLORIDE 100 MG: 100 TABLET ORAL at 21:03

## 2025-06-19 RX ADMIN — SODIUM CHLORIDE 100 ML/HR: 9 INJECTION, SOLUTION INTRAVENOUS at 02:16

## 2025-06-19 RX ADMIN — ALPRAZOLAM 0.5 MG: 0.5 TABLET ORAL at 21:03

## 2025-06-19 RX ADMIN — ALPRAZOLAM 0.5 MG: 0.5 TABLET ORAL at 08:14

## 2025-06-19 RX ADMIN — HYDROCODONE BITARTRATE AND ACETAMINOPHEN 1 TABLET: 10; 325 TABLET ORAL at 13:29

## 2025-06-19 NOTE — PROGRESS NOTES
Name: Meme Mcginnis ADMIT: 2025   : 1960  PCP: Radu Rawls MD    MRN: [id number] LOS: 0 days   AGE/SEX: 64 y.o. female  ROOM: [facility]     Subjective   Subjective   Patient seen this morning.  Denies abdominal pain, complaints of diarrhea, reports had 3-4 bowel movements yesterday.  Nonbloody.  No nausea no vomiting.  N.p.o. with plan for EGD.    Review of Systems   As above  Objective   Objective   Vital Signs  Temp:  [97.3 °F (36.3 °C)-98 °F (36.7 °C)] 97.9 °F (36.6 °C)  Heart Rate:  [54-85] 85  Resp:  [16-18] 16  BP: ()/(50-84) 115/84  SpO2:  [96 %-98 %] 96 %  on   ;   Device (Oxygen Therapy): room air  Body mass index is 21.57 kg/m².  Physical Exam    General: Alert, laying in bed, not in distress,  HEENT: Normocephalic, atraumatic  CV: Regular rate and rhythm, no murmurs rubs or gallops  Lungs: Clear to auscultation bilaterally, no crackles or wheezes  Abdomen: Soft, nontender, nondistended  Extremities: No significant peripheral edema , no cyanosis     Results Review     I reviewed the patient's new clinical results.  Results from last 7 days   Lab Units 25  1411 25  0553 25  2355 25  1511   WBC 10*3/mm3  --  4.07  --  6.20   HEMOGLOBIN g/dL 10.8* 9.8* 10.0* 11.5*   PLATELETS 10*3/mm3  --  347  --  457*     Results from last 7 days   Lab Units 25  0553 25  1511   SODIUM mmol/L 138 135*   POTASSIUM mmol/L 4.2 3.6   CHLORIDE mmol/L 109* 98   CO2 mmol/L 21.2* 22.0   BUN mg/dL 10.0 14.0   CREATININE mg/dL 0.73 0.88   GLUCOSE mg/dL 96 103*   Estimated Creatinine Clearance: 74.2 mL/min (by C-G formula based on SCr of 0.73 mg/dL).  Results from last 7 days   Lab Units 25  1511   ALBUMIN g/dL 4.4   BILIRUBIN mg/dL 0.2   ALK PHOS U/L 61   AST (SGOT) U/L 13   ALT (SGPT) U/L 10     Results from last 7 days   Lab Units 25  0553 25  1511   CALCIUM mg/dL 8.2* 9.8   ALBUMIN g/dL  --  4.4   MAGNESIUM mg/dL 2.0  --    PHOSPHORUS mg/dL 3.1   "--      Results from last 7 days   Lab Units 06/19/25  1411   LACTATE mmol/L 1.3     COVID19   Date Value Ref Range Status   09/30/2022 Not Detected Not Detected - Ref. Range Final   04/27/2022 Not Detected Not Detected - Ref. Range Final     No results found for: \"HGBA1C\", \"POCGLU\"        CT Abdomen Pelvis With Contrast  Narrative: CT ABDOMEN AND PELVIS WITH IV CONTRAST     HISTORY: Abdominal pain     TECHNIQUE: Radiation dose reduction techniques were utilized, including  automated exposure control and exposure modulation based on body size.  Axial images were obtained through the abdomen and pelvis after the  administration of IV contrast. Coronal and sagittal reformatted images  obtained.     COMPARISON: 1/4/2024     FINDINGS:      ABDOMEN:  The lung bases are clear. The liver and the gallbladder are  unremarkable. The spleen is unremarkable. The kidneys are unremarkable.  The adrenal glands are unremarkable. The pancreas is unremarkable. There  is thickening of the first portion of the duodenum with a small  outpouching along the posterior wall of the first portion duodenum. The  appearance raises the concern for a duodenal ulcer. There is no evidence  of extraluminal air or fluid collection. There is no free air.     PELVIS:  The bladder is unremarkable. Hysterectomy. The colon is unremarkable.  Normal appendix. No acute bony abnormality     Impression: There is thickening of the first portion of the duodenum with small  outpouching along the posterior wall, appearance raising the concern for  a duodenal ulcer. No evidence of extraluminal air or fluid collection or  free air. Would suggest GI consultation     Radiation dose reduction techniques were utilized, including automated  exposure control and exposure modulation based on body size.        This report was finalized on 6/18/2025 4:10 PM by Dr. Johan Ramirez M.D on Workstation: [id number]       Scheduled Medications  ALPRAZolam, 0.5 mg, Oral, " BID  fentaNYL, 1 patch, Transdermal, Q72H   And  Check Fentanyl Patch Placement, 1 each, Not Applicable, Q12H  citalopram, 40 mg, Oral, Q PM  ferric gluconate, 125 mg, Intravenous, Once  melatonin, 2.5 mg, Oral, Nightly  multivitamin, 1 tablet, Oral, Daily  pantoprazole, 40 mg, Intravenous, BID AC  sucralfate, 1 g, Oral, BID AC  traZODone, 100 mg, Oral, Nightly    Infusions     Diet  Diet: Gastrointestinal; Fiber-Restricted; Texture: Soft to Chew (NDD 3); Soft to Chew: Whole Meat; Fluid Consistency: Thin (IDDSI 0)    I have personally reviewed     [x]  Laboratory   [x]  Microbiology   [x]  Radiology   []  EKG/Telemetry  []  Cardiology/Vascular   []  Pathology    []  Records       Assessment/Plan     Active Hospital Problems    Diagnosis  POA    **Duodenal ulcer [K26.9]  Yes    Iron deficiency anemia [D50.9]  Unknown    GI bleed [K92.2]  Yes    COPD (chronic obstructive pulmonary disease) [J44.9]  Yes      Resolved Hospital Problems   No resolved problems to display.     Patient is a 64 y.o. female with a medical history of constipation, RA, COPD who presents to the ED abdominal pain for 2 weeks worsened prior to admission, diarrhea, nausea    Duodenal ulcer  Iron-deficiency anemia  -CT scan showed thickening of the first portion of the duodenum with small outpouching along the posterior wall, appearance raising the concern for a duodenal ulcer.   -GI was consulted patient underwent EGD which showed non-bleeding duodenal ulcer with no stigmata of bleeding. - Biopsies were taken with a cold forceps for histology. I  - Continue 40 mg PO BID,sucralfate tablets 1 gram PO BID  -Hemoglobin stable between 10-11.  Iron panel consistent with iron deficiency anemia.    -Ordered IV iron    Diarrhea  - gastrointestinal panel/C. Difficile    Chronic pain  - On significant amount of opioids, fentanyl patch 50 mcg every 72 hours, as needed Bridgette Tidwell reviewed, continue outpatient meds.        SCDs for DVT prophylaxis.  Full  code.  Discussed with patient.  Discussed in multidisciplinary rounds  Disposition: Home, likely tomorrow  Expected Discharge Date: 6/22/2025; Expected Discharge Time:        Copied text in this note has been reviewed and is accurate as of 06/19/25.         Dictated utilizing Dragon dictation        Aparna Villagran MD  [facility]  06/19/25  17:35 EDT

## 2025-06-19 NOTE — CASE MANAGEMENT/SOCIAL WORK
Continued Stay Note  [facility]     Patient Name: Meme Mcginnis  MRN: [id number]  Today's Date: 6/19/2025    Admit Date: 6/18/2025    Plan: Plan home with family.   DONNA Juarez RN   Discharge Plan       Row Name 06/19/25 0753       Plan    Plan Plan home with family.   DONNA Juarez RN    Patient/Family in Agreement with Plan yes    Plan Comments FACE SHEET VERIFIED/ VILLA SIGNED.  Spoke with pt at bedside. Pt's PCP is Dr. Radu Rawls.  Pt lives with her daughter ( Elli Mcginnis [phone]) in a single story house.  Pt is independent with ADLs.  Pt denies using any DMEs.  Pt gets her prescriptions at [facility].  Pt denies any issues affording medications. Pt is not current with HH.  Pt has not been in SNF.  Pt denies any discharge needs.  Pt's daughter will assist pt at home and transport pt home. Plan home with family.   DONNA Juarez RN                   Discharge Codes    No documentation.                 Expected Discharge Date and Time       Expected Discharge Date Expected Discharge Time    Jun 22, 2025               Ermelinda Juarez RN

## 2025-06-19 NOTE — ANESTHESIA POSTPROCEDURE EVALUATION
Patient: Meme Mcginnis    Procedure Summary       Date: 06/19/25 Room / Location: [facility] ENDOSCOPY 8 / [facility] ENDOSCOPY    Anesthesia Start: 1217 Anesthesia Stop: 1230    Procedure: ESOPHAGOGASTRODUODENOSCOPY with biopsies (Esophagus) Diagnosis:       Gastrointestinal hemorrhage with melena      (Gastrointestinal hemorrhage with melena [K92.1])    Surgeons: Selvin Gipson MD Provider: Chai Henderson MD    Anesthesia Type: MAC ASA Status: 2            Anesthesia Type: MAC    Vitals  Vitals Value Taken Time   /62 06/19/25 12:58   Temp     Pulse 81 06/19/25 12:58   Resp 16 06/19/25 12:47   SpO2 97 % 06/19/25 12:58   Vitals shown include unfiled device data.        Post Anesthesia Care and Evaluation    Patient location during evaluation: PACU  Patient participation: complete - patient participated  Level of consciousness: awake and alert  Pain management: adequate    Airway patency: patent  Anesthetic complications: No anesthetic complications    Cardiovascular status: acceptable  Respiratory status: acceptable  Hydration status: acceptable    Comments: --------------------            06/19/25               1247     --------------------   BP:       140/80     Pulse:      78       Resp:       16       Temp:                SpO2:      96%      --------------------

## 2025-06-19 NOTE — H&P
HISTORY AND PHYSICAL   [facility]        Date of Admission: 2025  Patient Identification:  Name: Meme Mcginnis  Age: 64 y.o.  Sex: female  :  1960  MRN: [id number]                     Primary Care Physician: Radu Rawls MD    Chief Complaint:  64 year old female presented to the ED with upper abdominal pain and black stools; symptoms started two weeks ago but got worse two days ago; no fever or chills; she has noted a small amount of blood in her stool as well; she denies fever or chills; no nausea or vomiting; no sick contacts;     History of Present Illness:   As above    Past Medical History:  Past Medical History:   Diagnosis Date    Allergic rhinitis     Anxiety     Arthralgia     Chronic neck and back pain     Chronic radicular low back pain     COPD (chronic obstructive pulmonary disease) 2024    Cough     DDD (degenerative disc disease), cervical     DDD (degenerative disc disease), lumbar     Depression     Fatigue     Hyperlipidemia     Insomnia     Long term use of drug     Neck pain     Palpitations     Rheumatoid arthritis 2024     Past Surgical History:  Past Surgical History:   Procedure Laterality Date    COLONOSCOPY      COLONOSCOPY N/A 2024    Procedure: COLONOSCOPY to transverse colon withcold biopsies;  Surgeon: Selvin Cunha MD;  Location: [facility] ENDOSCOPY;  Service: Gastroenterology;  Laterality: N/A;  Pre: Gi bleed  Post: normal to transverse    ENDOSCOPY N/A 2024    Procedure: ESOPHAGOGASTRODUODENOSCOPY with cold biopsies;  Surgeon: Selvin Cunha MD;  Location: [facility] ENDOSCOPY;  Service: Gastroenterology;  Laterality: N/A;  Pre: GI bleed  Post: canida, duodenal ulcer, gastritis    HYSTERECTOMY      NECK SURGERY        Home Meds:  Medications Prior to Admission   Medication Sig Dispense Refill Last Dose/Taking    ALPRAZolam (XANAX) 0.5 MG tablet Take 1 tablet by mouth 2 (Two) Times a Day.   Taking    citalopram  (CeleXA) 40 MG tablet Take 1 tablet by mouth Every Evening.   Taking    fentaNYL (DURAGESIC) 50 MCG/HR patch Place 1 patch on the skin as directed by provider Every Other Day.   2025    HYDROcodone-acetaminophen (NORCO)  MG per tablet Take 1 tablet by mouth Every 4 (Four) Hours As Needed for Moderate Pain.   Taking As Needed    multivitamin tablet tablet Take 1 tablet by mouth Daily.   Taking    tiZANidine (ZANAFLEX) 4 MG tablet Take 1 tablet by mouth Every 6 (Six) Hours As Needed for Muscle Spasms.   Taking As Needed    traZODone (DESYREL) 100 MG tablet Take 1 tablet by mouth Every Night.   Taking    escitalopram (LEXAPRO) 20 MG tablet Take 1 tablet by mouth Daily.       fluconazole (Diflucan) 100 MG tablet Take 1 tablet by mouth Daily. 14 tablet 0     pantoprazole (PROTONIX) 40 MG EC tablet Take 1 tablet by mouth 2 (Two) Times a Day Before Meals. 60 tablet 0        Allergies:  No Known Allergies  Immunizations:  Immunization History   Administered Date(s) Administered    COVID-19 (MODERNA) 1st,2nd,3rd Dose Monovalent 2021, 2021     Social History:   Social History     Social History Narrative    Not on file     Social History     Socioeconomic History    Marital status:    Tobacco Use    Smoking status: Former     Current packs/day: 0.00     Average packs/day: 0.3 packs/day for 35.0 years (8.8 ttl pk-yrs)     Types: Cigarettes     Start date: 10/16/1987     Quit date: 10/16/2022     Years since quittin.6    Smokeless tobacco: Never   Vaping Use    Vaping status: Some Days    Substances: Nicotine   Substance and Sexual Activity    Alcohol use: Not Currently    Drug use: Not Currently    Sexual activity: Defer       Family History:  Family History   Problem Relation Age of Onset    Atrial fibrillation Mother         Review of Systems  See history of present illness and past medical history.  Patient denies headache, dizziness, syncope, falls, trauma, change in vision, change in  hearing, change in taste, changes in weight, changes in appetite, focal weakness, numbness, or paresthesia.  Patient denies chest pain, palpitations, dyspnea, orthopnea, PND, cough, sinus pressure, rhinorrhea, epistaxis, hemoptysis, nausea, vomiting,hematemesis, diarrhea, constipation   Denies cold or heat intolerance, polydipsia, polyuria, polyphagia. Denies hematuria, pyuria, dysuria, hesitancy, frequency or urgency. Denies consumption of raw and under cooked meats foods or change in water source.  Denies fever, chills, sweats, night sweats.       Objective:  T Max 24 hrs: Temp (24hrs), Av.3 °F (36.8 °C), Min:98 °F (36.7 °C), Max:98.5 °F (36.9 °C)    Vitals Ranges:   Temp:  [98 °F (36.7 °C)-98.5 °F (36.9 °C)] 98 °F (36.7 °C)  Heart Rate:  [] 63  Resp:  [16] 16  BP: ()/(62-74) 105/62      Exam:  /62   Pulse 63   Temp 98 °F (36.7 °C) (Oral)   Resp 16   SpO2 98%     General Appearance:    Alert, cooperative, no distress, appears stated age   Head:    Normocephalic, without obvious abnormality, atraumatic   Eyes:    PERRL, conjunctivae/corneas clear, EOM's intact, both eyes   Ears:    Normal external ear canals, both ears   Nose:   Nares normal, septum midline, mucosa normal, no drainage    or sinus tenderness   Throat:   Lips, mucosa, and tongue normal   Neck:   Supple, symmetrical, trachea midline, no adenopathy;     thyroid:  no enlargement/tenderness/nodules; no carotid    bruit or JVD   Back:     Symmetric, no curvature, ROM normal, no CVA tenderness   Lungs:     Decreased breath sounds bilaterally, respirations unlabored   Chest Wall:    No tenderness or deformity    Heart:    Regular rate and rhythm, S1 and S2 normal, no murmur, rub   or gallop   Abdomen:     Soft, nontender, bowel sounds active all four quadrants,     no masses, no hepatomegaly, no splenomegaly   Extremities:   Extremities normal, atraumatic, no cyanosis or edema                       .    Data Review:  Labs in chart  were reviewed.  WBC   Date Value Ref Range Status   06/18/2025 6.20 3.40 - 10.80 10*3/mm3 Final     Hemoglobin   Date Value Ref Range Status   06/18/2025 11.5 (L) 12.0 - 15.9 g/dL Final     Hematocrit   Date Value Ref Range Status   06/18/2025 35.2 34.0 - 46.6 % Final     Platelets   Date Value Ref Range Status   06/18/2025 457 (H) 140 - 450 10*3/mm3 Final     Sodium   Date Value Ref Range Status   06/18/2025 135 (L) 136 - 145 mmol/L Final     Potassium   Date Value Ref Range Status   06/18/2025 3.6 3.5 - 5.2 mmol/L Final     Chloride   Date Value Ref Range Status   06/18/2025 98 98 - 107 mmol/L Final     CO2   Date Value Ref Range Status   06/18/2025 22.0 22.0 - 29.0 mmol/L Final     BUN   Date Value Ref Range Status   06/18/2025 14.0 8.0 - 23.0 mg/dL Final     Creatinine   Date Value Ref Range Status   06/18/2025 0.88 0.57 - 1.00 mg/dL Final     Glucose   Date Value Ref Range Status   06/18/2025 103 (H) 65 - 99 mg/dL Final     Calcium   Date Value Ref Range Status   06/18/2025 9.8 8.6 - 10.5 mg/dL Final     AST (SGOT)   Date Value Ref Range Status   06/18/2025 13 1 - 32 U/L Final     ALT (SGPT)   Date Value Ref Range Status   06/18/2025 10 1 - 33 U/L Final     Alkaline Phosphatase   Date Value Ref Range Status   06/18/2025 61 39 - 117 U/L Final                Imaging Results (All)       Procedure Component Value Units Date/Time    CT Abdomen Pelvis With Contrast [[id number]] Collected: 06/18/25 1602     Updated: 06/18/25 1613    Narrative:      CT ABDOMEN AND PELVIS WITH IV CONTRAST     HISTORY: Abdominal pain     TECHNIQUE: Radiation dose reduction techniques were utilized, including  automated exposure control and exposure modulation based on body size.  Axial images were obtained through the abdomen and pelvis after the  administration of IV contrast. Coronal and sagittal reformatted images  obtained.     COMPARISON: 1/4/2024     FINDINGS:      ABDOMEN:  The lung bases are clear. The liver and the gallbladder  are  unremarkable. The spleen is unremarkable. The kidneys are unremarkable.  The adrenal glands are unremarkable. The pancreas is unremarkable. There  is thickening of the first portion of the duodenum with a small  outpouching along the posterior wall of the first portion duodenum. The  appearance raises the concern for a duodenal ulcer. There is no evidence  of extraluminal air or fluid collection. There is no free air.     PELVIS:  The bladder is unremarkable. Hysterectomy. The colon is unremarkable.  Normal appendix. No acute bony abnormality       Impression:      There is thickening of the first portion of the duodenum with small  outpouching along the posterior wall, appearance raising the concern for  a duodenal ulcer. No evidence of extraluminal air or fluid collection or  free air. Would suggest GI consultation     Radiation dose reduction techniques were utilized, including automated  exposure control and exposure modulation based on body size.        This report was finalized on 6/18/2025 4:10 PM by Dr. Johan Ramirez M.D on Workstation: [id number]                 Assessment:  Active Hospital Problems    Diagnosis  POA    **Duodenal ulcer [K26.9]  Yes    GI bleed [K92.2]  Yes      Resolved Hospital Problems   No resolved problems to display.   Anemia  Rheumatoid arthritis  Copd  Melena  Opioid dependence    Plan:  Ask gi to see her  Trend labs  Ppi  Monitor on telemetry  Dw patient and ed provider    Ayesha Adam MD  6/18/2025  20:31 EDT

## 2025-06-19 NOTE — PLAN OF CARE
Goal Outcome Evaluation:      Patient admitted last night.Alert and oriented.IVFs infusing.250cc bolus given for low SBP 80s.Medicated for chronic back pain per MAR.No nausea or diarrhea this shift.GI consult

## 2025-06-19 NOTE — ANESTHESIA PREPROCEDURE EVALUATION
Anesthesia Evaluation     Patient summary reviewed and Nursing notes reviewed                Airway   Mallampati: II  Dental      Pulmonary    (+) a smoker Former, COPD,  Cardiovascular     ECG reviewed  Rhythm: regular  Rate: normal    (+) valvular problems/murmurs (mild TI) TI, CAD, hyperlipidemia      Neuro/Psych  (+) numbness, psychiatric history Anxiety and Depression  GI/Hepatic/Renal/Endo    (+) PUD, GI bleeding     Musculoskeletal     (+) neck pain  Abdominal    Substance History - negative use     OB/GYN negative ob/gyn ROS         Other   arthritis,                   Anesthesia Plan    ASA 2     MAC     intravenous induction     Anesthetic plan, risks, benefits, and alternatives have been provided, discussed and informed consent has been obtained with: patient.    CODE STATUS:    Code Status (Patient has no pulse and is not breathing): CPR (Attempt to Resuscitate)  Medical Interventions (Patient has pulse or is breathing): Full

## 2025-06-19 NOTE — CONSULTS
[facility]  Initial Inpatient Consult Note    Referring Provider: Cullen Hopkins    Reason for Consultation: Black stool, history of ulcer abnormal imaging    Subjective     History of present illness:    64 y.o. female with GI past medical history significant for collagenous colitis, duodenal ulcer found in January 2024, Candida esophagitis found in 2024, admitted with black stool and abnormal imaging.  See imaging below.  She also endorses epigastric pain that does not radiate worse with movement better at rest worse with eating better while fasting.  She has had small mount of bright red blood per rectum also.  No hematemesis.    She does take Protonix and does not take any antiplatelet or anticoagulant medication    Past Medical History:  Past Medical History:   Diagnosis Date    Allergic rhinitis     Anxiety     Arthralgia     Chronic neck and back pain     Chronic radicular low back pain     COPD (chronic obstructive pulmonary disease) 01/08/2024    Cough     DDD (degenerative disc disease), cervical     DDD (degenerative disc disease), lumbar     Depression     Fatigue     Hyperlipidemia     Insomnia     Long term use of drug     Neck pain     Palpitations     Rheumatoid arthritis 01/08/2024     Past Surgical History:  Past Surgical History:   Procedure Laterality Date    COLONOSCOPY  1998    COLONOSCOPY N/A 1/9/2024    Procedure: COLONOSCOPY to transverse colon withcold biopsies;  Surgeon: Selvin Cunha MD;  Location: [facility] ENDOSCOPY;  Service: Gastroenterology;  Laterality: N/A;  Pre: Gi bleed  Post: normal to transverse    ENDOSCOPY N/A 1/9/2024    Procedure: ESOPHAGOGASTRODUODENOSCOPY with cold biopsies;  Surgeon: Selvin Cunha MD;  Location: [facility] ENDOSCOPY;  Service: Gastroenterology;  Laterality: N/A;  Pre: GI bleed  Post: canida, duodenal ulcer, gastritis    HYSTERECTOMY      NECK SURGERY        Social History:   Social History     Tobacco Use    Smoking status:  Former     Current packs/day: 0.00     Average packs/day: 0.3 packs/day for 35.0 years (8.8 ttl pk-yrs)     Types: Cigarettes     Start date: 10/16/1987     Quit date: 10/16/2022     Years since quittin.6    Smokeless tobacco: Never   Substance Use Topics    Alcohol use: Not Currently      Family History:  Family History   Problem Relation Age of Onset    Atrial fibrillation Mother        Home Meds:  Medications Prior to Admission   Medication Sig Dispense Refill Last Dose/Taking    ALPRAZolam (XANAX) 0.5 MG tablet Take 1 tablet by mouth 2 (Two) Times a Day.   Taking    citalopram (CeleXA) 40 MG tablet Take 1 tablet by mouth Every Evening.   Taking    fentaNYL (DURAGESIC) 50 MCG/HR patch Place 1 patch on the skin as directed by provider Every Other Day.   2025    HYDROcodone-acetaminophen (NORCO)  MG per tablet Take 1 tablet by mouth Every 4 (Four) Hours As Needed for Moderate Pain.   Taking As Needed    multivitamin tablet tablet Take 1 tablet by mouth Daily.   Taking    tiZANidine (ZANAFLEX) 4 MG tablet Take 1 tablet by mouth Every 6 (Six) Hours As Needed for Muscle Spasms.   Taking As Needed    traZODone (DESYREL) 100 MG tablet Take 1 tablet by mouth Every Night.   Taking    escitalopram (LEXAPRO) 20 MG tablet Take 1 tablet by mouth Daily.       fluconazole (Diflucan) 100 MG tablet Take 1 tablet by mouth Daily. 14 tablet 0     pantoprazole (PROTONIX) 40 MG EC tablet Take 1 tablet by mouth 2 (Two) Times a Day Before Meals. 60 tablet 0      Current Meds:   ALPRAZolam, 0.5 mg, Oral, BID  fentaNYL, 1 patch, Transdermal, Q72H   And  Check Fentanyl Patch Placement, 1 each, Not Applicable, Q12H  citalopram, 40 mg, Oral, Q PM  melatonin, 2.5 mg, Oral, Nightly  multivitamin, 1 tablet, Oral, Daily  pantoprazole, 40 mg, Intravenous, BID AC  traZODone, 100 mg, Oral, Nightly      Allergies:  No Known Allergies  Review of Systems  There is weakness and fatigue all other systems reviewed and negative      Objective     Vital Signs  Temp:  [97.3 °F (36.3 °C)-98.5 °F (36.9 °C)] 97.9 °F (36.6 °C)  Heart Rate:  [] 58  Resp:  [16-18] 18  BP: ()/(50-74) 90/60  Physical Exam:  General Appearance:    Alert, cooperative, in no acute distress   Head:    Normocephalic, without obvious abnormality, atraumatic   Eyes:          Conjunctivae and sclerae normal, no icterus   Throat:   No thrush, oral mucosa moist   Neck:   Supple, no adenopathy   Lungs:     Clear to auscultation bilaterally    Heart:    Regular rhythm and normal rate    Chest Wall:    No abnormalities observed   Abdomen:     Soft, nondistended, nontender; normal bowel sounds   Extremities:   No edema, no redness   Skin:   No bruising or rash   Psychiatric:   Normal mood and insight     Results Review:   I reviewed the patient's new clinical results.    Results from last 7 days   Lab Units 06/19/25  0553 06/18/25  2355 06/18/25  1511   WBC 10*3/mm3 4.07  --  6.20   HEMOGLOBIN g/dL 9.8* 10.0* 11.5*   HEMATOCRIT % 29.6* 30.7* 35.2   PLATELETS 10*3/mm3 347  --  457*     Results from last 7 days   Lab Units 06/19/25  0553 06/18/25  1511   SODIUM mmol/L 138 135*   POTASSIUM mmol/L 4.2 3.6   CHLORIDE mmol/L 109* 98   CO2 mmol/L 21.2* 22.0   BUN mg/dL 10.0 14.0   CREATININE mg/dL 0.73 0.88   CALCIUM mg/dL 8.2* 9.8   BILIRUBIN mg/dL  --  0.2   ALK PHOS U/L  --  61   ALT (SGPT) U/L  --  10   AST (SGOT) U/L  --  13   GLUCOSE mg/dL 96 103*         Lab Results   Lab Value Date/Time    LIPASE 23 06/18/2025 1511    LIPASE 28 01/04/2024 1535    LIPASE 16 02/16/2023 1440    LIPASE 21 02/18/2022 0748    LIPASE 18 09/11/2021 1235    LIPASE 18 04/25/2021 1641       Radiology:  CT Abdomen Pelvis With Contrast   Final Result   There is thickening of the first portion of the duodenum with small   outpouching along the posterior wall, appearance raising the concern for   a duodenal ulcer. No evidence of extraluminal air or fluid collection or   free air. Would suggest GI  consultation       Radiation dose reduction techniques were utilized, including automated   exposure control and exposure modulation based on body size.           This report was finalized on 6/18/2025 4:10 PM by Dr. Johan Ramirez M.D on Workstation: [id number]              Assessment & Plan   Active Hospital Problems    Diagnosis     **Duodenal ulcer     GI bleed     COPD (chronic obstructive pulmonary disease)        Assessment:  Anemia  Melena  Bright blood per rectum  Abnormal CT with possible duodenal ulcer  History of duodenal ulcer January 2024  History of Candida esophagitis January 2024  History of collagenous colitis    Plan:  Plan for EGD today  IV Protonix  N.p.o.  Further recommendations after EGD today      I discussed the patient's findings and my recommendations with patient and nursing staff.    Selvin Gipson MD

## 2025-06-19 NOTE — CASE MANAGEMENT/SOCIAL WORK
Discharge Planning Assessment  [facility]     Patient Name: Meme Mcginnis  MRN: [id number]  Today's Date: 6/19/2025    Admit Date: 6/18/2025    Plan: Plan home with family.   DONNA Juarez RN   Discharge Needs Assessment       Row Name 06/19/25 0751       Living Environment    People in Home child(gillian), adult    Name(s) of People in Home Daughter  ( Elli Mcginnis [phone])    Current Living Arrangements home    Potentially Unsafe Housing Conditions none    In the past 12 months has the electric, gas, oil, or water company threatened to shut off services in your home? No    Primary Care Provided by self    Provides Primary Care For no one    Family Caregiver if Needed child(gillian), adult    Family Caregiver Names Daughter ( Elli Mcginnis [phone])    Quality of Family Relationships helpful;involved;supportive    Able to Return to Prior Arrangements yes    Living Arrangement Comments Pt lives with her daughter  ( Elli Mcginnis [phone])  in a single story house.       Resource/Environmental Concerns    Resource/Environmental Concerns none    Transportation Concerns none       Transportation Needs    In the past 12 months, has lack of transportation kept you from medical appointments or from getting medications? no    In the past 12 months, has lack of transportation kept you from meetings, work, or from getting things needed for daily living? No       Food Insecurity    Within the past 12 months, you worried that your food would run out before you got the money to buy more. Never true    Within the past 12 months, the food you bought just didn't last and you didn't have money to get more. Never true       Transition Planning    Patient/Family Anticipates Transition to home with family    Patient/Family Anticipated Services at Transition none    Transportation Anticipated family or friend will provide       Discharge Needs Assessment    Readmission Within the Last 30 Days no previous admission in last 30 days     Equipment Currently Used at Home none    Concerns to be Addressed no discharge needs identified;denies needs/concerns at this time    Anticipated Changes Related to Illness none    Equipment Needed After Discharge none                   Discharge Plan       Row Name 06/19/25 0753       Plan    Plan Plan home with family.   DONNA Juarez RN    Patient/Family in Agreement with Plan yes    Plan Comments FACE SHEET VERIFIED/ VILLA SIGNED.  Spoke with pt at bedside. Pt's PCP is Dr. Radu Rawls.  Pt lives with her daughter ( Elli Mcginnis [phone]) in a single story house.  Pt is independent with ADLs.  Pt denies using any DMEs.  Pt gets her prescriptions [facility].  Pt denies any issues affording medications. Pt is not current with HH.  Pt has not been in SNF.  Pt denies any discharge needs.  Pt's daughter will assist pt at home and transport pt home. Plan home with family.   DONNA Juarez RN                    Expected Discharge Date and Time       Expected Discharge Date Expected Discharge Time    Jun 22, 2025            Demographic Summary       Row Name 06/19/25 0751       General Information    Admission Type observation    Arrived From emergency department    Required Notices Provided Observation Status Notice    Referral Source admission list    Reason for Consult discharge planning    Preferred Language English                   Functional Status       Row Name 06/19/25 0751       Functional Status    Usual Activity Tolerance good    Current Activity Tolerance good       Functional Status, IADL    Medications independent    Meal Preparation independent    Housekeeping independent    Laundry independent    Shopping independent       Mental Status    General Appearance WDL WDL                   Psychosocial    No documentation.                  Abuse/Neglect    No documentation.                  Legal    No documentation.                  Substance Abuse    No documentation.                   Patient Forms    No documentation.                     Ermelinda Juarez RN

## 2025-06-20 ENCOUNTER — APPOINTMENT (OUTPATIENT)
Dept: CARDIOLOGY | Facility: HOSPITAL | Age: 65
End: 2025-06-20
Payer: MEDICARE

## 2025-06-20 ENCOUNTER — READMISSION MANAGEMENT (OUTPATIENT)
Dept: CALL CENTER | Facility: HOSPITAL | Age: 65
End: 2025-06-20
Payer: MEDICARE

## 2025-06-20 VITALS
TEMPERATURE: 97.7 F | HEIGHT: 65 IN | SYSTOLIC BLOOD PRESSURE: 116 MMHG | HEART RATE: 70 BPM | WEIGHT: 129 LBS | RESPIRATION RATE: 16 BRPM | OXYGEN SATURATION: 93 % | BODY MASS INDEX: 21.49 KG/M2 | DIASTOLIC BLOOD PRESSURE: 87 MMHG

## 2025-06-20 LAB
ANION GAP SERPL CALCULATED.3IONS-SCNC: 8.8 MMOL/L (ref 5–15)
AORTIC DIMENSIONLESS INDEX: 0.78 (DI)
AV MEAN PRESS GRAD SYS DOP V1V2: 2.34 MMHG
AV VMAX SYS DOP: 105.4 CM/SEC
BH CV ECHO MEAS - AO MAX PG: 4.4 MMHG
BH CV ECHO MEAS - AO V2 VTI: 20.9 CM
BH CV ECHO MEAS - AVA(I,D): 2.7 CM2
BH CV ECHO MEAS - EDV(CUBED): 144.1 ML
BH CV ECHO MEAS - EDV(MOD-SP2): 48 ML
BH CV ECHO MEAS - EDV(MOD-SP4): 60 ML
BH CV ECHO MEAS - EF(MOD-SP2): 58.3 %
BH CV ECHO MEAS - EF(MOD-SP4): 61.7 %
BH CV ECHO MEAS - ESV(CUBED): 39.3 ML
BH CV ECHO MEAS - ESV(MOD-SP2): 20 ML
BH CV ECHO MEAS - ESV(MOD-SP4): 23 ML
BH CV ECHO MEAS - FS: 35.1 %
BH CV ECHO MEAS - IVS/LVPW: 1.02 CM
BH CV ECHO MEAS - IVSD: 0.8 CM
BH CV ECHO MEAS - LAT PEAK E' VEL: 17.6 CM/SEC
BH CV ECHO MEAS - LV DIASTOLIC VOL/BSA (35-75): 36.5 CM2
BH CV ECHO MEAS - LV MASS(C)D: 145.6 GRAMS
BH CV ECHO MEAS - LV MAX PG: 2.7 MMHG
BH CV ECHO MEAS - LV MEAN PG: 1.45 MMHG
BH CV ECHO MEAS - LV SYSTOLIC VOL/BSA (12-30): 14 CM2
BH CV ECHO MEAS - LV V1 MAX: 82.8 CM/SEC
BH CV ECHO MEAS - LV V1 VTI: 16.3 CM
BH CV ECHO MEAS - LVIDD: 5.2 CM
BH CV ECHO MEAS - LVIDS: 3.4 CM
BH CV ECHO MEAS - LVOT AREA: 3.5 CM2
BH CV ECHO MEAS - LVOT DIAM: 2.11 CM
BH CV ECHO MEAS - LVPWD: 0.78 CM
BH CV ECHO MEAS - MED PEAK E' VEL: 12.1 CM/SEC
BH CV ECHO MEAS - MV A DUR: 0.12 SEC
BH CV ECHO MEAS - MV A MAX VEL: 69.2 CM/SEC
BH CV ECHO MEAS - MV DEC SLOPE: 307.8 CM/SEC2
BH CV ECHO MEAS - MV DEC TIME: 0.19 SEC
BH CV ECHO MEAS - MV E MAX VEL: 82.8 CM/SEC
BH CV ECHO MEAS - MV E/A: 1.2
BH CV ECHO MEAS - MV MAX PG: 2.9 MMHG
BH CV ECHO MEAS - MV MEAN PG: 1.19 MMHG
BH CV ECHO MEAS - MV P1/2T: 85.5 MSEC
BH CV ECHO MEAS - MV V2 VTI: 24.9 CM
BH CV ECHO MEAS - MVA(P1/2T): 2.6 CM2
BH CV ECHO MEAS - MVA(VTI): 2.29 CM2
BH CV ECHO MEAS - PA ACC TIME: 0.11 SEC
BH CV ECHO MEAS - PA V2 MAX: 77.9 CM/SEC
BH CV ECHO MEAS - PULM A REVS DUR: 0.12 SEC
BH CV ECHO MEAS - PULM A REVS VEL: 35.6 CM/SEC
BH CV ECHO MEAS - PULM DIAS VEL: 50.8 CM/SEC
BH CV ECHO MEAS - PULM S/D: 1.25
BH CV ECHO MEAS - PULM SYS VEL: 63.4 CM/SEC
BH CV ECHO MEAS - RV MAX PG: 2.09 MMHG
BH CV ECHO MEAS - RV V1 MAX: 72.3 CM/SEC
BH CV ECHO MEAS - RV V1 VTI: 16.1 CM
BH CV ECHO MEAS - SV(LVOT): 57.2 ML
BH CV ECHO MEAS - SV(MOD-SP2): 28 ML
BH CV ECHO MEAS - SV(MOD-SP4): 37 ML
BH CV ECHO MEAS - SVI(LVOT): 34.8 ML/M2
BH CV ECHO MEAS - SVI(MOD-SP2): 17.1 ML/M2
BH CV ECHO MEAS - SVI(MOD-SP4): 22.5 ML/M2
BH CV ECHO MEAS - TAPSE (>1.6): 2.9 CM
BH CV ECHO MEASUREMENTS AVERAGE E/E' RATIO: 5.58
BH CV XLRA - RV BASE: 3.1 CM
BH CV XLRA - RV LENGTH: 6.2 CM
BH CV XLRA - RV MID: 2.8 CM
BH CV XLRA - TDI S': 13.7 CM/SEC
BUN SERPL-MCNC: 8 MG/DL (ref 8–23)
BUN/CREAT SERPL: 11.1 (ref 7–25)
CALCIUM SPEC-SCNC: 8.4 MG/DL (ref 8.6–10.5)
CHLORIDE SERPL-SCNC: 112 MMOL/L (ref 98–107)
CO2 SERPL-SCNC: 23.2 MMOL/L (ref 22–29)
CREAT SERPL-MCNC: 0.72 MG/DL (ref 0.57–1)
DEPRECATED RDW RBC AUTO: 47.6 FL (ref 37–54)
EGFRCR SERPLBLD CKD-EPI 2021: 94.7 ML/MIN/1.73
ERYTHROCYTE [DISTWIDTH] IN BLOOD BY AUTOMATED COUNT: 13.4 % (ref 12.3–15.4)
GLUCOSE SERPL-MCNC: 99 MG/DL (ref 65–99)
HCT VFR BLD AUTO: 29.2 % (ref 34–46.6)
HGB BLD-MCNC: 9.4 G/DL (ref 12–15.9)
LV EF BIPLANE MOD: 60.3 %
MCH RBC QN AUTO: 30.8 PG (ref 26.6–33)
MCHC RBC AUTO-ENTMCNC: 32.2 G/DL (ref 31.5–35.7)
MCV RBC AUTO: 95.7 FL (ref 79–97)
PLATELET # BLD AUTO: 327 10*3/MM3 (ref 140–450)
PMV BLD AUTO: 9.4 FL (ref 6–12)
POTASSIUM SERPL-SCNC: 4.2 MMOL/L (ref 3.5–5.2)
RBC # BLD AUTO: 3.05 10*6/MM3 (ref 3.77–5.28)
SINUS: 3.4 CM
SODIUM SERPL-SCNC: 144 MMOL/L (ref 136–145)
WBC NRBC COR # BLD AUTO: 3.73 10*3/MM3 (ref 3.4–10.8)

## 2025-06-20 PROCEDURE — 25010000002 NA FERRIC GLUC CPLX PER 12.5 MG: Performed by: STUDENT IN AN ORGANIZED HEALTH CARE EDUCATION/TRAINING PROGRAM

## 2025-06-20 PROCEDURE — 93306 TTE W/DOPPLER COMPLETE: CPT

## 2025-06-20 PROCEDURE — 80048 BASIC METABOLIC PNL TOTAL CA: CPT | Performed by: STUDENT IN AN ORGANIZED HEALTH CARE EDUCATION/TRAINING PROGRAM

## 2025-06-20 PROCEDURE — 99214 OFFICE O/P EST MOD 30 MIN: CPT | Performed by: INTERNAL MEDICINE

## 2025-06-20 PROCEDURE — G0378 HOSPITAL OBSERVATION PER HR: HCPCS

## 2025-06-20 PROCEDURE — 93306 TTE W/DOPPLER COMPLETE: CPT | Performed by: INTERNAL MEDICINE

## 2025-06-20 PROCEDURE — 85027 COMPLETE CBC AUTOMATED: CPT | Performed by: STUDENT IN AN ORGANIZED HEALTH CARE EDUCATION/TRAINING PROGRAM

## 2025-06-20 RX ORDER — PANTOPRAZOLE SODIUM 40 MG/1
40 TABLET, DELAYED RELEASE ORAL
Qty: 60 TABLET | Refills: 0 | Status: SHIPPED | OUTPATIENT
Start: 2025-06-20 | End: 2025-07-20

## 2025-06-20 RX ORDER — MIDODRINE HYDROCHLORIDE 5 MG/1
5 TABLET ORAL
Status: DISCONTINUED | OUTPATIENT
Start: 2025-06-20 | End: 2025-06-20 | Stop reason: HOSPADM

## 2025-06-20 RX ORDER — SUCRALFATE 1 G/1
1 TABLET ORAL
Qty: 60 TABLET | Refills: 0 | Status: SHIPPED | OUTPATIENT
Start: 2025-06-20 | End: 2025-07-20

## 2025-06-20 RX ORDER — MIDODRINE HYDROCHLORIDE 5 MG/1
2.5 TABLET ORAL
Status: DISCONTINUED | OUTPATIENT
Start: 2025-06-20 | End: 2025-06-20

## 2025-06-20 RX ORDER — MIDODRINE HYDROCHLORIDE 5 MG/1
2.5 TABLET ORAL ONCE
Status: COMPLETED | OUTPATIENT
Start: 2025-06-20 | End: 2025-06-20

## 2025-06-20 RX ORDER — MIDODRINE HYDROCHLORIDE 5 MG/1
5 TABLET ORAL
Qty: 90 TABLET | Refills: 0 | Status: SHIPPED | OUTPATIENT
Start: 2025-06-20 | End: 2025-07-20

## 2025-06-20 RX ORDER — PANTOPRAZOLE SODIUM 40 MG/1
40 TABLET, DELAYED RELEASE ORAL
Status: DISCONTINUED | OUTPATIENT
Start: 2025-06-20 | End: 2025-06-20 | Stop reason: HOSPADM

## 2025-06-20 RX ORDER — ACETAMINOPHEN 325 MG/1
650 TABLET ORAL ONCE
Status: COMPLETED | OUTPATIENT
Start: 2025-06-20 | End: 2025-06-20

## 2025-06-20 RX ORDER — CETIRIZINE HYDROCHLORIDE 10 MG/1
5 TABLET ORAL ONCE
Status: COMPLETED | OUTPATIENT
Start: 2025-06-20 | End: 2025-06-20

## 2025-06-20 RX ORDER — FERROUS SULFATE 325(65) MG
325 TABLET ORAL EVERY OTHER DAY
Qty: 15 TABLET | Refills: 0 | Status: SHIPPED | OUTPATIENT
Start: 2025-06-20 | End: 2025-07-20

## 2025-06-20 RX ADMIN — TIZANIDINE 4 MG: 4 TABLET ORAL at 14:15

## 2025-06-20 RX ADMIN — MIDODRINE HYDROCHLORIDE 2.5 MG: 5 TABLET ORAL at 08:32

## 2025-06-20 RX ADMIN — HYDROCODONE BITARTRATE AND ACETAMINOPHEN 1 TABLET: 10; 325 TABLET ORAL at 15:56

## 2025-06-20 RX ADMIN — PANTOPRAZOLE SODIUM 40 MG: 40 INJECTION, POWDER, FOR SOLUTION INTRAVENOUS at 07:00

## 2025-06-20 RX ADMIN — FENTANYL 1 PATCH: 50 PATCH TRANSDERMAL at 12:00

## 2025-06-20 RX ADMIN — MIDODRINE HYDROCHLORIDE 5 MG: 5 TABLET ORAL at 12:42

## 2025-06-20 RX ADMIN — SUCRALFATE 1 G: 1 TABLET ORAL at 07:00

## 2025-06-20 RX ADMIN — HYDROCODONE BITARTRATE AND ACETAMINOPHEN 1 TABLET: 10; 325 TABLET ORAL at 12:00

## 2025-06-20 RX ADMIN — THERA TABS 1 TABLET: TAB at 08:32

## 2025-06-20 RX ADMIN — HYDROCODONE BITARTRATE AND ACETAMINOPHEN 1 TABLET: 10; 325 TABLET ORAL at 05:39

## 2025-06-20 RX ADMIN — SODIUM CHLORIDE 125 MG: 9 INJECTION, SOLUTION INTRAVENOUS at 08:33

## 2025-06-20 RX ADMIN — ALPRAZOLAM 0.5 MG: 0.5 TABLET ORAL at 08:32

## 2025-06-20 RX ADMIN — CETIRIZINE HYDROCHLORIDE 5 MG: 10 TABLET, FILM COATED ORAL at 08:32

## 2025-06-20 RX ADMIN — ACETAMINOPHEN 650 MG: 325 TABLET, FILM COATED ORAL at 08:32

## 2025-06-20 RX ADMIN — MIDODRINE HYDROCHLORIDE 2.5 MG: 5 TABLET ORAL at 10:03

## 2025-06-20 NOTE — CODE DOCUMENTATION
"Patient Name:  Meme Mcginnis  YOB: 1960  MRN:  [id number]  Admit Date:  6/18/2025    Visit Diagnoses:     ICD-10-CM ICD-9-CM   1. Duodenal ulcer  K26.9 532.90   2. Gastrointestinal hemorrhage with melena  K92.1 578.1       Reason For Rapid: hypotension    RN Communicated With: Primary RN, Valerie ARGUELLES      Rapid Outcome: remain on unit    Communication From Rapid Team: Patient awake and alert. Bp 83/56  Stat cbc ordered.    Primary RN to update [facility] with labs values.  Patient already type and screened.    Call RRT with any other concerns or question.     Most Recent Vital Signs  Temp:  [97.3 °F (36.3 °C)-97.9 °F (36.6 °C)] 97.9 °F (36.6 °C)  Heart Rate:  [54-85] 66  Resp:  [16-18] 16  BP: ()/(50-84) 87/57  SpO2:  [96 %-100 %] 100 %  on  Flow (L/min) (Oxygen Therapy):  [2] 2;   Device (Oxygen Therapy): nasal cannula    Labs:      Glucose   Date/Time Value Ref Range Status   06/19/2025 1948 141 (H) 70 - 130 mg/dL Final     No results found for: \"SITE\", \"ALLENTEST\", \"PHART\", \"UYQ2BFQ\", \"PO2ART\", \"PVB3GSJ\", \"BASEEXCESS\", \"V7GWZPOR\", \"HGBBG\", \"HCTABG\", \"OXYHEMOGLOBI\", \"METHHGBN\", \"CARBOXYHGB\", \"CO2CT\", \"BAROMETRIC\", \"MODALITY\", \"FIO2\"  Results from last 7 days   Lab Units 06/19/25  1411 06/19/25  0553 06/18/25  2355 06/18/25  1511   WBC 10*3/mm3  --  4.07  --  6.20   HEMOGLOBIN g/dL 10.8* 9.8* 10.0* 11.5*   PLATELETS 10*3/mm3  --  347  --  457*     Results from last 7 days   Lab Units 06/19/25  0553 06/18/25  1511   SODIUM mmol/L 138 135*   POTASSIUM mmol/L 4.2 3.6   CHLORIDE mmol/L 109* 98   CO2 mmol/L 21.2* 22.0   BUN mg/dL 10.0 14.0   CREATININE mg/dL 0.73 0.88   GLUCOSE mg/dL 96 103*   ALBUMIN g/dL  --  4.4   BILIRUBIN mg/dL  --  0.2   ALK PHOS U/L  --  61   AST (SGOT) U/L  --  13   ALT (SGPT) U/L  --  10   Estimated Creatinine Clearance: 74.2 mL/min (by C-G formula based on SCr of 0.73 mg/dL).      Results from last 7 days   Lab Units 06/19/25  1411   LACTATE mmol/L 1.3     No results found " "for: \"STREPPNEUAG\", \"LEGANTIGENUR\"        NIH Stroke Scale:                                                         Please refer to full rapid documentation on summary page under Index / Code Timeline   "

## 2025-06-20 NOTE — CASE MANAGEMENT/SOCIAL WORK
Case Management Discharge Note      Final Note: Pt discharged home with family.   ANGEL Juarez RN         Selected Continued Care - Admitted Since 6/18/2025       Destination    No services have been selected for the patient.                Durable Medical Equipment    No services have been selected for the patient.                Dialysis/Infusion    No services have been selected for the patient.                Home Medical Care    No services have been selected for the patient.                Therapy    No services have been selected for the patient.                Community Resources    No services have been selected for the patient.                Community & DME    No services have been selected for the patient.                    Transportation Services  Private: Car    Final Discharge Disposition Code: 01 - home or self-care

## 2025-06-20 NOTE — PROGRESS NOTES
[facility]  Inpatient Progress Note    Reason for Follow Up: Duodenal ulcer    Subjective     Interval History:   Duodenal ulcer found yesterday no stigmata, added twice daily Carafate.  Continue twice daily Protonix    Current Facility-Administered Medications:     ALPRAZolam (XANAX) tablet 0.5 mg, 0.5 mg, Oral, BID, Selvin Gipson MD, 0.5 mg at 06/20/25 0832    Calcium Replacement - Follow Nurse / BPA Driven Protocol, , Not Applicable, PRN, Selvin Gipson MD    fentaNYL (DURAGESIC) 50 MCG/HR patch 1 patch, 1 patch, Transdermal, Q72H **AND** Check Fentanyl Patch Placement, 1 each, Not Applicable, Q12H, Selvin Gipson MD    citalopram (CeleXA) tablet 40 mg, 40 mg, Oral, Q PM, Selvin Gipson MD, 40 mg at 06/19/25 1655    HYDROcodone-acetaminophen (NORCO)  MG per tablet 1 tablet, 1 tablet, Oral, Q4H PRN, Selvin Gipson MD, 1 tablet at 06/20/25 0539    Magnesium Standard Dose Replacement - Follow Nurse / BPA Driven Protocol, , Not Applicable, PRN, Selvin Gipson MD    melatonin tablet 2.5 mg, 2.5 mg, Oral, Nightly, Selvin Gipson MD, 2.5 mg at 06/19/25 2103    midodrine (PROAMATINE) tablet 5 mg, 5 mg, Oral, TID Tyshawn POWER Bokhodir S, MD    multivitamin (THERAGRAN) tablet 1 tablet, 1 tablet, Oral, Daily, Selvin Gipson MD, 1 tablet at 06/20/25 0832    ondansetron ODT (ZOFRAN-ODT) disintegrating tablet 4 mg, 4 mg, Oral, Q6H PRN **OR** ondansetron (ZOFRAN) injection 4 mg, 4 mg, Intravenous, Q6H PRN, Selvin Gipson MD, 4 mg at 06/19/25 1239    pantoprazole (PROTONIX) injection 40 mg, 40 mg, Intravenous, BID ACBrandan Brian M, MD, 40 mg at 06/20/25 0700    Phosphorus Replacement - Follow Nurse / BPA Driven Protocol, , Not Applicable, Brandan LEWIS Brian M, MD    Potassium Replacement - Follow Nurse / BPA Driven Protocol, , Not Applicable, Brandan LEWIS Brian M, MD    sodium chloride 0.9 % flush 10 mL, 10 mL, Intravenous, Brandan LEWIS Brian M, MD    sucralfate (CARAFATE)  tablet 1 g, 1 g, Oral, BID AC, Selvin Gipson MD, 1 g at 06/20/25 0700    tiZANidine (ZANAFLEX) tablet 4 mg, 4 mg, Oral, Q6H PRN, Selvin Gipson MD    traZODone (DESYREL) tablet 100 mg, 100 mg, Oral, Nightly, Selvin Gipson MD, 100 mg at 06/19/25 2103  Review of Systems:    There is weakness and fatigue all other systems reviewed and negative    Objective     Vital Signs  Temp:  [97.7 °F (36.5 °C)-98 °F (36.7 °C)] 97.7 °F (36.5 °C)  Heart Rate:  [62-85] 70  Resp:  [16-18] 16  BP: ()/(47-84) 88/50  Body mass index is 21.57 kg/m².    Intake/Output Summary (Last 24 hours) at 6/20/2025 1046  Last data filed at 6/19/2025 2330  Gross per 24 hour   Intake 340 ml   Output --   Net 340 ml     No intake/output data recorded.     Physical Exam:   General: patient awake, alert and cooperative   Eyes: Normal lids and lashes, no scleral icterus   Neck: supple, normal ROM   Skin: warm and dry, not jaundiced   Cardiovascular: regular rhythm and rate, no murmurs auscultated   Pulm: clear to auscultation bilaterally, regular and unlabored   Abdomen: soft, nontender, nondistended; normal bowel sounds   Extremities: no rash or edema   Psychiatric: Normal mood and behavior; memory intact     Results Review:     I reviewed the patient's new clinical results.    Results from last 7 days   Lab Units 06/20/25  0834 06/19/25  2148 06/19/25  2003 06/19/25  1411 06/19/25  0553   WBC 10*3/mm3 3.73  --  4.42  --  4.07   HEMOGLOBIN g/dL 9.4* 9.5* 9.7*   < > 9.8*   HEMATOCRIT % 29.2* 30.6* 32.3*   < > 29.6*   PLATELETS 10*3/mm3 327  --  308  --  347    < > = values in this interval not displayed.     Results from last 7 days   Lab Units 06/20/25  0511 06/19/25  0553 06/18/25  1511   SODIUM mmol/L 144 138 135*   POTASSIUM mmol/L 4.2 4.2 3.6   CHLORIDE mmol/L 112* 109* 98   CO2 mmol/L 23.2 21.2* 22.0   BUN mg/dL 8.0 10.0 14.0   CREATININE mg/dL 0.72 0.73 0.88   CALCIUM mg/dL 8.4* 8.2* 9.8   BILIRUBIN mg/dL  --   --  0.2   ALK PHOS U/L   --   --  61   ALT (SGPT) U/L  --   --  10   AST (SGOT) U/L  --   --  13   GLUCOSE mg/dL 99 96 103*         Lab Results   Lab Value Date/Time    LIPASE 23 06/18/2025 1511    LIPASE 28 01/04/2024 1535    LIPASE 16 02/16/2023 1440    LIPASE 21 02/18/2022 0748    LIPASE 18 09/11/2021 1235    LIPASE 18 04/25/2021 1641       Radiology:  CT Abdomen Pelvis With Contrast   Final Result   There is thickening of the first portion of the duodenum with small   outpouching along the posterior wall, appearance raising the concern for   a duodenal ulcer. No evidence of extraluminal air or fluid collection or   free air. Would suggest GI consultation       Radiation dose reduction techniques were utilized, including automated   exposure control and exposure modulation based on body size.           This report was finalized on 6/18/2025 4:10 PM by Dr. Johan Ramirez M.D on Workstation: [id number]              Assessment & Plan     Active Hospital Problems    Diagnosis     **Duodenal ulcer     Iron deficiency anemia     GI bleed     COPD (chronic obstructive pulmonary disease)     Chronic pain        Assessment:  Anemia  Melena  Bright blood per rectum  Abnormal CT with possible duodenal ulcer  History of duodenal ulcer January 2024  History of Candida esophagitis January 2024  History of collagenous colitis  Duodenal ulcer       Plan:  Twice daily PPI  Carafate twice daily  Biopsies yesterday with no H. Pylori  Avoid NSAIDs  No objection to discharge  We will follow peripherally  I discussed the patients findings and my recommendations with patient and nursing staff.    Selvin Gipson MD

## 2025-06-20 NOTE — OUTREACH NOTE
Prep Survey      Flowsheet Row Responses   [facility]? [facility]   Is LACE score < 7 ? No   Eligibility Readm Mgmt   Discharge diagnosis Duodenal ulcer   Does the patient have one of the following disease processes/diagnoses(primary or secondary)? Other   Prep survey completed? Yes            Lora SINGH - Registered Nurse

## 2025-06-20 NOTE — DISCHARGE INSTRUCTIONS
Notify your primary care provider if you experience chest pain, difficulty breathing, fevers/chills, nausea or vomiting, bleeding in stool, excessive diarrhea, numbness or weakness or tingling in any part of your body.    Monitor blood pressure twice daily and record.  Follow-up with blood pressure log with PCP.

## 2025-06-20 NOTE — DISCHARGE SUMMARY
Patient Name: Meme Mcginnis  : 1960  MRN: [id number]    Date of Admission: 2025  Date of Discharge:  2025  Primary Care Physician: Radu Rawls MD      Chief Complaint:   Abdominal Pain      Discharge Diagnoses     Active Hospital Problems    Diagnosis  POA    **Duodenal ulcer [K26.9]  Yes    Iron deficiency anemia [D50.9]  Unknown    GI bleed [K92.2]  Yes    COPD (chronic obstructive pulmonary disease) [J44.9]  Yes    Chronic pain [G89.29]  Yes      Resolved Hospital Problems   No resolved problems to display.        Hospital Course     Patient is a 64 y.o. female with a medical history of constipation, RA, COPD who presents to the ED abdominal pain for 2 weeks worsened prior to admission, diarrhea, nausea     Duodenal ulcer  Iron-deficiency anemia  -CT scan showed thickening of the first portion of the duodenum with small outpouching along the posterior wall, appearance raising the concern for a duodenal ulcer.   -GI was consulted patient underwent EGD which showed non-bleeding duodenal ulcer with no stigmata of bleeding. - Biopsies were taken with a cold forceps for histology.  Biopsies with no H. pylori.  - Patient was initiated on pantoprazole 40 mg PO BID,sucralfate tablets 1 gram PO BID, continue to discharge.    -Hemoglobin trended down and remained stable around 9.5.  Patient received IV iron x 2 will be discharged on p.o. iron every other day.  Will need CBC in 1 week to monitor hemoglobin.  -     Diarrhea  - gastrointestinal panel/C. Difficile was obtained, patient did not have BM since admission, abdominal symptoms resolved.  We also discontinued.     Chronic pain  - On significant amount of opioids, fentanyl patch 50 mcg every 72 hours, as needed Bridgette Tidwell reviewed, continue outpatient meds.    Hypotension  Patient's SBP was in 80s/90s, reports her blood pressure chronically runs low.  Denies dizziness, asymptomatic.  Lactic acid was normal, creatinine normal.   Echocardiogram obtained with normal EF, no significant valvular abnormality.  Discharged on midodrine with hold parameters for SBP above 120.  Suspect hypotension hyperthyroidism to chronic opiates as patient is on significant opioids outpatient with fentanyl patch and as needed high-dose Norco.  Recommended to taper down as able in the setting of significant hypotension.        At the time of discharge patient was told to take all medications as prescribed, keep all follow-up appointments, and call their doctor or return to the hospital with any worsening or concerning symptoms.         Day of Discharge     Subjective:  Patient's blood pressure remains soft, systolic 80s/90s.  Denies dizziness/symptoms when asked.  Reports chronic soft BP.  Denies abdominal pain, no further episodes of diarrhea/no BM since yesterday.  Tolerated IV iron yesterday.  Feeling better.  Wants to be discharged home.    Physical Exam:  Temp:  [97.7 °F (36.5 °C)-98 °F (36.7 °C)] 97.7 °F (36.5 °C)  Heart Rate:  [62-72] 70  Resp:  [16-18] 16  BP: ()/(47-87) 116/87  Body mass index is 21.47 kg/m².  Physical Exam    General: Alert, lying in bed, not in distress,  HEENT: Normocephalic, atraumatic  CV: Regular rate and rhythm, no murmurs rubs or gallops  Lungs: Clear to auscultation bilaterally, no crackles or wheezes  Abdomen: Soft, nontender, nondistended  Extremities: No significant peripheral edema , no cyanosis     Consultants     Consult Orders (all) (From admission, onward)       Start     Ordered    06/18/25 1647  Inpatient Gastroenterology Consult  Once        Specialty:  Gastroenterology  Provider:  Selvin Gipson MD    06/18/25 1647    06/18/25 1638  [facility] (on-call MD unless specified) Details  Once        Specialty:  Hospitalist  Provider:  Ayesha Adam MD    06/18/25 1637    06/18/25 1638  Gastroenterology (on-call MD unless specified)  Once        Specialty:  Gastroenterology  Provider:  Richelle Young MD     06/18/25 1637    06/18/25 1625  Gastroenterology (on-call MD unless specified)  Once,   Status:  Canceled        Specialty:  Gastroenterology  Provider:  Selvin Gipson MD    06/18/25 1624                  Procedures     ESOPHAGOGASTRODUODENOSCOPY with biopsies      Imaging Results (All)       Procedure Component Value Units Date/Time    CT Abdomen Pelvis With Contrast [[id number]] Collected: 06/18/25 1602     Updated: 06/18/25 1613    Narrative:      CT ABDOMEN AND PELVIS WITH IV CONTRAST     HISTORY: Abdominal pain     TECHNIQUE: Radiation dose reduction techniques were utilized, including  automated exposure control and exposure modulation based on body size.  Axial images were obtained through the abdomen and pelvis after the  administration of IV contrast. Coronal and sagittal reformatted images  obtained.     COMPARISON: 1/4/2024     FINDINGS:      ABDOMEN:  The lung bases are clear. The liver and the gallbladder are  unremarkable. The spleen is unremarkable. The kidneys are unremarkable.  The adrenal glands are unremarkable. The pancreas is unremarkable. There  is thickening of the first portion of the duodenum with a small  outpouching along the posterior wall of the first portion duodenum. The  appearance raises the concern for a duodenal ulcer. There is no evidence  of extraluminal air or fluid collection. There is no free air.     PELVIS:  The bladder is unremarkable. Hysterectomy. The colon is unremarkable.  Normal appendix. No acute bony abnormality       Impression:      There is thickening of the first portion of the duodenum with small  outpouching along the posterior wall, appearance raising the concern for  a duodenal ulcer. No evidence of extraluminal air or fluid collection or  free air. Would suggest GI consultation     Radiation dose reduction techniques were utilized, including automated  exposure control and exposure modulation based on body size.        This report was finalized on  "6/18/2025 4:10 PM by Dr. Johan Ramirez M.D on Workstation: [id number]             Results for orders placed during the hospital encounter of 09/30/22    Duplex Venous Lower Extremity - Bilateral CAR    Interpretation Summary  · Normal bilateral lower extremity venous duplex scan.    Results for orders placed during the hospital encounter of 06/18/25    Adult Transthoracic Echo Complete W/ Cont if Necessary Per Protocol    Interpretation Summary    Left ventricular systolic function is normal. Calculated left ventricular EF = 60.3%    Left ventricular diastolic function was normal.    Pertinent Labs     Results from last 7 days   Lab Units 06/20/25  0834 06/19/25  2148 06/19/25 2003 06/19/25  1411 06/19/25  0553 06/18/25  2355 06/18/25  1511   WBC 10*3/mm3 3.73  --  4.42  --  4.07  --  6.20   HEMOGLOBIN g/dL 9.4* 9.5* 9.7* 10.8* 9.8*   < > 11.5*   PLATELETS 10*3/mm3 327  --  308  --  347  --  457*    < > = values in this interval not displayed.     Results from last 7 days   Lab Units 06/20/25  0511 06/19/25  0553 06/18/25  1511   SODIUM mmol/L 144 138 135*   POTASSIUM mmol/L 4.2 4.2 3.6   CHLORIDE mmol/L 112* 109* 98   CO2 mmol/L 23.2 21.2* 22.0   BUN mg/dL 8.0 10.0 14.0   CREATININE mg/dL 0.72 0.73 0.88   GLUCOSE mg/dL 99 96 103*   Estimated Creatinine Clearance: 74.8 mL/min (by C-G formula based on SCr of 0.72 mg/dL).  Results from last 7 days   Lab Units 06/18/25  1511   ALBUMIN g/dL 4.4   BILIRUBIN mg/dL 0.2   ALK PHOS U/L 61   AST (SGOT) U/L 13   ALT (SGPT) U/L 10     Results from last 7 days   Lab Units 06/20/25  0511 06/19/25  0553 06/18/25  1511   CALCIUM mg/dL 8.4* 8.2* 9.8   ALBUMIN g/dL  --   --  4.4   MAGNESIUM mg/dL  --  2.0  --    PHOSPHORUS mg/dL  --  3.1  --      Results from last 7 days   Lab Units 06/18/25  1511   LIPASE U/L 23             Invalid input(s): \"LDLCALC\"          Test Results Pending at Discharge     Pending Labs       Order Current Status    Tissue Pathology Exam Preliminary " result            Discharge Details        Discharge Medications        New Medications        Instructions Start Date   ferrous sulfate 325 (65 FE) MG tablet   325 mg, Oral, Every Other Day      midodrine 5 MG tablet  Commonly known as: PROAMATINE   5 mg, Oral, 3 Times Daily Before Meals, Hold for systolic blood pressure above 120      sucralfate 1 g tablet  Commonly known as: CARAFATE   1 g, Oral, 2 Times Daily Before Meals             Continue These Medications        Instructions Start Date   ALPRAZolam 0.5 MG tablet  Commonly known as: XANAX   0.5 mg, 2 Times Daily      citalopram 40 MG tablet  Commonly known as: CeleXA   40 mg, Every Evening      fentaNYL 50 MCG/HR patch  Commonly known as: DURAGESIC   1 patch, Every 48 Hours      fluconazole 100 MG tablet  Commonly known as: Diflucan   100 mg, Oral, Daily      HYDROcodone-acetaminophen  MG per tablet  Commonly known as: NORCO   1 tablet, Every 4 Hours PRN      multivitamin tablet tablet   1 tablet, Daily      pantoprazole 40 MG EC tablet  Commonly known as: PROTONIX   40 mg, Oral, 2 Times Daily Before Meals      tiZANidine 4 MG tablet  Commonly known as: ZANAFLEX   4 mg, Every 6 Hours PRN      traZODone 100 MG tablet  Commonly known as: DESYREL   100 mg, Nightly             Stop These Medications      escitalopram 20 MG tablet  Commonly known as: LEXAPRO              No Known Allergies    Discharge Disposition:  Home or Self Care      Discharge Diet:  Diet Order   Procedures    Diet: Gastrointestinal; Fiber-Restricted; Texture: Soft to Chew (NDD 3); Soft to Chew: Whole Meat; Fluid Consistency: Thin (IDDSI 0)       Discharge Activity:       CODE STATUS:    Code Status and Medical Interventions: CPR (Attempt to Resuscitate); Full   Ordered at: 06/18/25 8948     Code Status (Patient has no pulse and is not breathing):    CPR (Attempt to Resuscitate)     Medical Interventions (Patient has pulse or is breathing):    Full       No future appointments.    Follow-up Information       Radu Rawls MD .    Specialty: General Practice  Contact information:  [address]  [facility] [address] [phone]                             Time Spent on Discharge:  Greater than 30 minutes      Aparna Villagran MD  [facility]  06/20/25  16:13 EDT

## 2025-06-21 ENCOUNTER — NURSE TRIAGE (OUTPATIENT)
Dept: CALL CENTER | Facility: HOSPITAL | Age: 65
End: 2025-06-21
Payer: MEDICARE

## 2025-06-21 NOTE — TELEPHONE ENCOUNTER
Patient called regarding medication Pantoprazole. States she thinks it is causing tremors. Tremors started in the hospital and she informed Providers who were not concerned. Tremors are mild, equal on both side, noticeable from waist up, arms. Reviewed AVS, Redd. Advised Patient to follow up with PCP on Monday to discuss side effects. Continue medication for now. Instructed to call [facility] if symptoms worsen or change.         Reason for Disposition   [1] Caller has NON-URGENT medicine question about med that PCP prescribed AND [2] triager unable to answer question    Additional Information   Negative: [1] Intentional drug overdose AND [2] suicidal thoughts or ideas   Negative: Drug overdose and triager unable to answer question   Negative: Caller requesting a renewal or refill of a medicine patient is currently taking   Negative: Caller requesting information unrelated to medicine   Negative: Caller requesting information about COVID-19 Vaccine   Negative: Caller requesting information about Emergency Contraception   Negative: Caller requesting information about Combined Birth Control Pills   Negative: Caller requesting information about Progestin Birth Control Pills   Negative: Caller requesting information about Post-Op pain or medicines   Negative: Caller requesting a prescription antibiotic (such as Penicillin) for Strep throat and has a positive culture result   Negative: Caller requesting a prescription anti-viral med (such as Tamiflu) and has influenza (flu) symptoms   Negative: Immunization reaction suspected   Negative: Rash while taking a medicine or within 3 days of stopping it   Negative: [1] Asthma and [2] having symptoms of asthma (cough, wheezing, etc.)   Negative: [1] Symptom of illness (e.g., headache, abdominal pain, earache, vomiting) AND [2] more than mild   Negative: Breastfeeding questions about mother's medicines and diet   Negative: MORE THAN A DOUBLE DOSE of a prescription or  "over-the-counter (OTC) drug   Negative: [1] DOUBLE DOSE (an extra dose or lesser amount) of prescription drug AND [2] any symptoms (e.g., dizziness, nausea, pain, sleepiness)   Negative: [1] DOUBLE DOSE (an extra dose or lesser amount) of over-the-counter (OTC) drug AND [2] any symptoms (e.g., dizziness, nausea, pain, sleepiness)   Negative: Took another person's prescription drug   Negative: [1] DOUBLE DOSE (an extra dose or lesser amount) of prescription drug AND [2] NO symptoms  (Exception: A double dose of antibiotics.)   Negative: Diabetes drug error or overdose (e.g., took wrong type of insulin or took extra dose)   Negative: [1] Prescription not at pharmacy AND [2] was prescribed by PCP recently (Exception: Triager has access to EMR and prescription is recorded there. Go to Home Care and confirm for pharmacy.)   Negative: [1] Pharmacy calling with prescription question AND [2] triager unable to answer question   Negative: [1] Caller has URGENT medicine question about med that PCP or specialist prescribed AND [2] triager unable to answer question   Negative: Medicine patch causing local rash or itching   Negative: [1] Caller has medicine question about med NOT prescribed by PCP AND [2] triager unable to answer question (e.g., compatibility with other med, storage)   Negative: Prescription request for new medicine (not a refill)    Answer Assessment - Initial Assessment Questions  1. NAME of MEDICINE: \"What medicine(s) are you calling about?\"      Pantoprazole   2. QUESTION: \"What is your question?\" (e.g., double dose of medicine, side effect)      Thinks medication is causing tremors   3. PRESCRIBER: \"Who prescribed the medicine?\" Reason: if prescribed by specialist, call should be referred to that group.      Hospitalist   4. SYMPTOMS: \"Do you have any symptoms?\" If Yes, ask: \"What symptoms are you having?\"  \"How bad are the symptoms (e.g., mild, moderate, severe)      Tremors   5. PREGNANCY:  \"Is there any " "chance that you are pregnant?\" \"When was your last menstrual period?\"      N/a    Protocols used: Medication Question Call-ADULT-    "

## 2025-06-23 LAB
CYTO UR: NORMAL
DX PRELIMINARY: NORMAL
LAB AP CASE REPORT: NORMAL
LAB AP SPECIAL STAINS: NORMAL
PATH REPORT.FINAL DX SPEC: NORMAL
PATH REPORT.GROSS SPEC: NORMAL

## 2025-06-25 ENCOUNTER — READMISSION MANAGEMENT (OUTPATIENT)
Dept: CALL CENTER | Facility: HOSPITAL | Age: 65
End: 2025-06-25
Payer: MEDICARE

## 2025-06-25 NOTE — OUTREACH NOTE
Medical Week 1 Survey      Flowsheet Row Responses   [facility] patient discharged from? [place]   Does the patient have one of the following disease processes/diagnoses(primary or secondary)? Other   Week 1 attempt successful? Yes   Call start time 1630   Call end time 1632   Discharge diagnosis Duodenal ulcer   Person spoke with today (if not patient) and relationship elvi Calloway   Meds reviewed with patient/caregiver? Yes   Is the patient having any side effects they believe may be caused by any medication additions or changes? No   Does the patient have all medications ordered at discharge? Yes   Is the patient taking all medications as directed (includes completed medication regime)? Yes   Does the patient have a primary care provider?  Yes   Does the patient have an appointment with their PCP within 7 days of discharge? No   What is preventing the patient from scheduling follow up appointments within 7 days of discharge? --  [daughter unsure if she has had F/U appt, instructed to have pt schedule F/U appt with PCP]   Has the patient kept scheduled appointments due by today? N/A   Psychosocial issues? No   Comments Daughter states pt is doing better.   Did the patient receive a copy of their discharge instructions? Yes   Nursing interventions Reviewed instructions with patient   What is the patient's perception of their health status since discharge? Improving   If the patient is a current smoker, are they able to teach back resources for cessation? Not a smoker   Additional teach back comments call disconnected prior to daughter doing teach back info.   Week 1 call completed? Yes   Would this patient benefit from a Referral to Amb Social Work? No   Is the patient interested in additional calls from an ambulatory ? No   Call end time 1632            Elva MORTENSEN - Registered Nurse

## 2025-06-27 ENCOUNTER — PATIENT MESSAGE (OUTPATIENT)
Dept: GASTROENTEROLOGY | Facility: CLINIC | Age: 65
End: 2025-06-27
Payer: MEDICARE

## 2025-07-01 ENCOUNTER — READMISSION MANAGEMENT (OUTPATIENT)
Dept: CALL CENTER | Facility: HOSPITAL | Age: 65
End: 2025-07-01
Payer: MEDICARE

## 2025-07-01 NOTE — OUTREACH NOTE
Medical Week 2 Survey      Flowsheet Row Responses   [facility] patient discharged from? [place]   Does the patient have one of the following disease processes/diagnoses(primary or secondary)? Other   Week 2 attempt successful? No   Unsuccessful attempts Attempt 1   oke Yemi Albert Registered Nurse

## 2025-07-28 ENCOUNTER — PATIENT MESSAGE (OUTPATIENT)
Dept: GASTROENTEROLOGY | Facility: CLINIC | Age: 65
End: 2025-07-28
Payer: MEDICARE

## 2025-07-31 RX ORDER — SUCRALFATE 1 G/1
1 TABLET ORAL 2 TIMES DAILY PRN
Qty: 90 TABLET | Refills: 3 | Status: SHIPPED | OUTPATIENT
Start: 2025-07-31

## 2025-07-31 RX ORDER — PANTOPRAZOLE SODIUM 40 MG/1
40 TABLET, DELAYED RELEASE ORAL 2 TIMES DAILY
Qty: 180 TABLET | Refills: 3 | Status: SHIPPED | OUTPATIENT
Start: 2025-07-31

## (undated) DEVICE — SINGLE-USE BIOPSY FORCEPS: Brand: RADIAL JAW 4

## (undated) DEVICE — LN SMPL CO2 SHTRM SD STREAM W/M LUER

## (undated) DEVICE — CANN O2 ETCO2 FITS ALL CONN CO2 SMPL A/ 7IN DISP LF

## (undated) DEVICE — TUBING, SUCTION, 1/4" X 10', STRAIGHT: Brand: MEDLINE

## (undated) DEVICE — BLCK/BITE BLOX W/DENTL/RIM W/STRAP 54F

## (undated) DEVICE — ADAPT CLN BIOGUARD AIR/H2O DISP

## (undated) DEVICE — KT ORCA ORCAPOD DISP STRL

## (undated) DEVICE — SENSR O2 OXIMAX FNGR A/ 18IN NONSTR

## (undated) DEVICE — MSK ENDO PORT O2 POM ELITE CURAPLEX A/

## (undated) DEVICE — FRCP BX RADJAW4 NDL 2.8 240CM LG OG BX40